# Patient Record
Sex: FEMALE | Race: WHITE | Employment: OTHER | URBAN - METROPOLITAN AREA
[De-identification: names, ages, dates, MRNs, and addresses within clinical notes are randomized per-mention and may not be internally consistent; named-entity substitution may affect disease eponyms.]

---

## 2017-01-27 ENCOUNTER — TELEPHONE (OUTPATIENT)
Dept: FAMILY MEDICINE | Facility: CLINIC | Age: 67
End: 2017-01-27

## 2017-01-27 NOTE — TELEPHONE ENCOUNTER
[facility]  Schedule at [phone] to schedule a mammogram.    Arelis Edwards

## 2017-02-02 ENCOUNTER — RADIANT APPOINTMENT (OUTPATIENT)
Dept: MAMMOGRAPHY | Facility: CLINIC | Age: 67
End: 2017-02-02
Payer: COMMERCIAL

## 2017-02-02 DIAGNOSIS — Z12.31 VISIT FOR SCREENING MAMMOGRAM: ICD-10-CM

## 2017-02-02 PROCEDURE — G0202 SCR MAMMO BI INCL CAD: HCPCS | Mod: TC

## 2017-02-06 ENCOUNTER — OFFICE VISIT (OUTPATIENT)
Dept: FAMILY MEDICINE | Facility: CLINIC | Age: 67
End: 2017-02-06
Payer: COMMERCIAL

## 2017-02-06 VITALS
HEART RATE: 66 BPM | OXYGEN SATURATION: 98 % | TEMPERATURE: 98.6 F | DIASTOLIC BLOOD PRESSURE: 79 MMHG | RESPIRATION RATE: 18 BRPM | BODY MASS INDEX: 23.55 KG/M2 | SYSTOLIC BLOOD PRESSURE: 128 MMHG | WEIGHT: 128 LBS | HEIGHT: 62 IN

## 2017-02-06 DIAGNOSIS — Z11.59 NEED FOR HEPATITIS C SCREENING TEST: ICD-10-CM

## 2017-02-06 DIAGNOSIS — R25.2 MUSCLE CRAMP: ICD-10-CM

## 2017-02-06 DIAGNOSIS — E78.5 HYPERLIPIDEMIA LDL GOAL <130: ICD-10-CM

## 2017-02-06 DIAGNOSIS — Z23 NEED FOR VACCINATION: ICD-10-CM

## 2017-02-06 DIAGNOSIS — F33.42 MAJOR DEPRESSIVE DISORDER, RECURRENT, IN FULL REMISSION (H): ICD-10-CM

## 2017-02-06 DIAGNOSIS — G89.4 CHRONIC PAIN SYNDROME: ICD-10-CM

## 2017-02-06 DIAGNOSIS — Z00.00 ENCOUNTER FOR ROUTINE ADULT HEALTH EXAMINATION WITHOUT ABNORMAL FINDINGS: Primary | ICD-10-CM

## 2017-02-06 DIAGNOSIS — F41.9 ANXIETY: ICD-10-CM

## 2017-02-06 PROCEDURE — G0009 ADMIN PNEUMOCOCCAL VACCINE: HCPCS | Performed by: NURSE PRACTITIONER

## 2017-02-06 PROCEDURE — 82306 VITAMIN D 25 HYDROXY: CPT | Performed by: NURSE PRACTITIONER

## 2017-02-06 PROCEDURE — 36415 COLL VENOUS BLD VENIPUNCTURE: CPT | Performed by: NURSE PRACTITIONER

## 2017-02-06 PROCEDURE — 80053 COMPREHEN METABOLIC PANEL: CPT | Performed by: NURSE PRACTITIONER

## 2017-02-06 PROCEDURE — 80061 LIPID PANEL: CPT | Performed by: NURSE PRACTITIONER

## 2017-02-06 PROCEDURE — 90670 PCV13 VACCINE IM: CPT | Performed by: NURSE PRACTITIONER

## 2017-02-06 PROCEDURE — 83735 ASSAY OF MAGNESIUM: CPT | Performed by: NURSE PRACTITIONER

## 2017-02-06 PROCEDURE — 99397 PER PM REEVAL EST PAT 65+ YR: CPT | Mod: 25 | Performed by: NURSE PRACTITIONER

## 2017-02-06 PROCEDURE — 99213 OFFICE O/P EST LOW 20 MIN: CPT | Mod: 25 | Performed by: NURSE PRACTITIONER

## 2017-02-06 PROCEDURE — 86803 HEPATITIS C AB TEST: CPT | Performed by: NURSE PRACTITIONER

## 2017-02-06 RX ORDER — OXYCODONE HYDROCHLORIDE 5 MG/1
TABLET ORAL
Qty: 150 TABLET | Refills: 0 | Status: SHIPPED | OUTPATIENT
Start: 2017-02-11 | End: 2017-02-06

## 2017-02-06 RX ORDER — HYDROXYZINE HYDROCHLORIDE 25 MG/1
50 TABLET, FILM COATED ORAL
Qty: 30 TABLET | Refills: 1 | Status: SHIPPED | OUTPATIENT
Start: 2017-02-06 | End: 2017-05-08

## 2017-02-06 RX ORDER — METHOCARBAMOL 750 MG/1
750 TABLET, FILM COATED ORAL EVERY 6 HOURS PRN
Qty: 180 TABLET | Status: SHIPPED | OUTPATIENT
Start: 2017-02-06 | End: 2018-01-31

## 2017-02-06 RX ORDER — OXYCODONE HYDROCHLORIDE 5 MG/1
TABLET ORAL
Qty: 150 TABLET | Refills: 0 | Status: SHIPPED | OUTPATIENT
Start: 2017-04-12 | End: 2017-05-08

## 2017-02-06 RX ORDER — OXYCODONE HYDROCHLORIDE 5 MG/1
TABLET ORAL
Qty: 150 TABLET | Refills: 0 | Status: SHIPPED | OUTPATIENT
Start: 2017-03-13 | End: 2017-02-06

## 2017-02-06 RX ORDER — SIMVASTATIN 20 MG
TABLET ORAL
Qty: 90 TABLET | Refills: 3 | Status: SHIPPED | OUTPATIENT
Start: 2017-02-06 | End: 2018-01-31

## 2017-02-06 RX ORDER — PAROXETINE 20 MG/1
10 TABLET, FILM COATED ORAL DAILY
Qty: 45 TABLET | Status: SHIPPED | OUTPATIENT
Start: 2017-02-06 | End: 2017-05-08

## 2017-02-06 NOTE — MR AVS SNAPSHOT
"              After Visit Summary   2/6/2017    Shania Holliday    MRN: [id number]           Patient Information     Date Of Birth          1950        Visit Information        Provider Department      2/6/2017 10:00 AM Lisa Merino NP [facility]        Today's Diagnoses     Encounter for routine adult health examination without abnormal findings    -  1     Chronic pain syndrome         Anxiety         Major depressive disorder, recurrent, in full remission (H)         Hyperlipidemia LDL goal <130         Muscle cramp         Need for hepatitis C screening test         Need for vaccination            Follow-ups after your visit        Who to contact     If you have questions or need follow up information about today's clinic visit or your schedule please contact [facility] directly at [phone].  Normal or non-critical lab and imaging results will be communicated to you by Highlighterhart, letter or phone within 4 business days after the clinic has received the results. If you do not hear from us within 7 days, please contact the clinic through Highlighterhart or phone. If you have a critical or abnormal lab result, we will notify you by phone as soon as possible.  Submit refill requests through Vahna or call your pharmacy and they will forward the refill request to us. Please allow 3 business days for your refill to be completed.          Additional Information About Your Visit        Highlighterhart Information     Vahna lets you send messages to your doctor, view your test results, renew your prescriptions, schedule appointments and more. To sign up, go to [email or web address] . Click on \"Log in\" on the left side of the screen, which will take you to the Welcome page. Then click on \"Sign up Now\" on the right side of the page.     You will be asked to enter the access code listed below, as well as some personal information. Please follow the directions to create your username " "and password.     Your access code is: [email or web address]  Expires: 2017 10:32 AM     Your access code will  in 90 days. If you need help or a new code, please call your [place] clinic or [phone].        Care EveryWhere ID     This is your Care EveryWhere ID. This could be used by other organizations to access your [place] medical records  [id number]        Your Vitals Were     Pulse Temperature Respirations Height BMI (Body Mass Index) Pulse Oximetry    66 98.6  F (37  C) (Oral) 18 5' 1.5\" (1.562 m) 23.80 kg/m2 98%       Blood Pressure from Last 3 Encounters:   17 128/79   16 129/79   10/29/16 115/75    Weight from Last 3 Encounters:   17 128 lb (58.06 kg)   16 129 lb (58.514 kg)   10/29/16 128 lb (58.06 kg)              We Performed the Following     Comprehensive metabolic panel     DEPRESSION ACTION PLAN (DAP)     Hepatitis C Screen Reflex to HCV RNA Quant and Genotype     Lipid Profile with reflex to direct LDL     Magnesium     PNEUMOCOCCAL CONJ VACCINE 13 VALENT IM (PREVNAR 13)     Vitamin D Deficiency          Today's Medication Changes          These changes are accurate as of: 17 10:32 AM.  If you have any questions, ask your nurse or doctor.               Start taking these medicines.        Dose/Directions    hydrOXYzine 25 MG tablet   Commonly known as:  ATARAX   Used for:  Anxiety        Dose:  50 mg   Take 2 tablets (50 mg) by mouth nightly as needed for anxiety   Quantity:  30 tablet   Refills:  1       oxyCODONE 5 MG IR tablet   Commonly known as:  ROXICODONE   Used for:  Chronic pain syndrome        Start taking on:  2017   Take 1-2 tablets by mouth. Every 4-6 hours as needed for pain  *Must last 30 days   Quantity:  150 tablet   Refills:  0            Where to get your medicines      These medications were sent to [id number] - [place], [place] - [address]  [address], [place] [address]    Hours:  test fax " successfully sent 10/22/03  Phone:  [phone]    - hydrOXYzine 25 MG tablet  - methocarbamol 750 MG tablet  - PARoxetine 20 MG tablet  - simvastatin 20 MG tablet      Some of these will need a paper prescription and others can be bought over the counter.  Ask your nurse if you have questions.     Bring a paper prescription for each of these medications    - oxyCODONE 5 MG IR tablet             Primary Care Provider Office Phone # Fax #    Lisa Merino [phone]       [facility] [address] [place]  [facility] [address]        Thank you!     Thank you for choosing [facility]  for your care. Our goal is always to provide you with excellent care. Hearing back from our patients is one way we can continue to improve our services. Please take a few minutes to complete the written survey that you may receive in the mail after your visit with us. Thank you!             Your Updated Medication List - Protect others around you: Learn how to safely use, store and throw away your medicines at www.disposemymeds.org.          This list is accurate as of: 2/6/17 10:32 AM.  Always use your most recent med list.                   Brand Name Dispense Instructions for use    albuterol 108 (90 BASE) MCG/ACT Inhaler    PROAIR HFA/PROVENTIL HFA/VENTOLIN HFA    1 Inhaler    Inhale 2 puffs into the lungs every 6 hours as needed for shortness of breath / dyspnea or wheezing       Aspercreme 10 % cream   Generic drug:  trolamine salicylate      daily use x4 times       aspirin 325 MG EC tablet      1 TABLET daily       CALCIUM + D PO      BID       cetirizine HCl 10 MG Caps    ZYRTEC ALLERGY    90 capsule    Take 10 mg by mouth daily Take  by mouth.       fluticasone 50 MCG/ACT spray    FLONASE     Spray 2 sprays into both nostrils as needed       hydrOXYzine 25 MG tablet    ATARAX    30 tablet    Take 2 tablets (50 mg) by mouth nightly as needed for anxiety       methocarbamol  750 MG tablet    ROBAXIN    180 tablet    Take 1 tablet (750 mg) by mouth every 6 hours as needed for muscle spasms       MULTIVITAMIN TABS   OR      Take one daily       nicotine polacrilex 2 MG gum    NICORETTE STARTER KIT    30 tablet    Place 1 each (2 mg) inside cheek as needed for smoking cessation       oxyCODONE 5 MG IR tablet   Start taking on:  4/12/2017    ROXICODONE    150 tablet    Take 1-2 tablets by mouth. Every 4-6 hours as needed for pain  *Must last 30 days       pantoprazole 40 MG EC tablet    PROTONIX    90 tablet    Take 1 tablet (40 mg) by mouth daily       PARoxetine 20 MG tablet    PAXIL    45 tablet    Take 0.5 tablets (10 mg) by mouth daily       PROBIOTIC DAILY PO          simvastatin 20 MG tablet    ZOCOR    90 tablet    Take  by mouth. 1 TABLET AT BEDTIME       valACYclovir 500 MG tablet    VALTREX    30 tablet    Take  by mouth. one tablet twice daily X 3 days PRN

## 2017-02-06 NOTE — Clinical Note
My Depression Action Plan  Name: Shania Holliday   Date of Birth 1950  Date: 2/6/2017    My doctor: Lisa Merino   My clinic: [address] [id number] [phone]          GREEN    ZONE   Good Control    What it looks like:     Things are going generally well. You have normal up s and down s. You may even feel depressed from time to time, but bad moods usually last less than a day.   What you need to do:  1. Continue to care for yourself (see self care plan)  2. Check your depression survival kit and update it as needed  3. Follow your physician s recommendations including any medication.  4. Do not stop taking medication unless you consult with your physician first.           YELLOW         ZONE Getting Worse    What it looks like:     Depression is starting to interfere with your life.     It may be hard to get out of bed; you may be starting to isolate yourself from others.    Symptoms of depression are starting to last most all day and this has happened for several days.     You may have suicidal thoughts but they are not constant.   What you need to do:     1. Call your care team, your response to treatment will improve if you keep your care team informed of your progress. Yellow periods are signs an adjustment may need to be made.     2. Continue your self-care, even if you have to fake it!    3. Talk to someone in your support network    4. Open up your depression survival kit           RED    ZONE Medical Alert - Get Help    What it looks like:     Depression is seriously interfering with your life.     You may experience these or other symptoms: You can t get out of bed most days, can t work or engage in other necessary activities, you have trouble taking care of basic hygiene, or basic responsibilities, thoughts of suicide or death that will not go away, self-injurious behavior.     What you need to do:  1. Call your care team and  request a same-day appointment. If they are not available (weekends or after hours) call your local crisis line, emergency room or 911.      Electronically signed by: Emily Montes, February 6, 2017    Depression Self Care Plan / Survival Kit    Self-Care for Depression  Here s the deal. Your body and mind are really not as separate as most people think.  What you do and think affects how you feel and how you feel influences what you do and think. This means if you do things that people who feel good do, it will help you feel better.  Sometimes this is all it takes.  There is also a place for medication and therapy depending on how severe your depression is, so be sure to consult with your medical provider and/ or Behavioral Health Consultant if your symptoms are worsening or not improving.     In order to better manage my stress, I will:    Exercise  Get some form of exercise, every day. This will help reduce pain and release endorphins, the  feel good  chemicals in your brain. This is almost as good as taking antidepressants!  This is not the same as joining a gym and then never going! (they count on that by the way ) It can be as simple as just going for a walk or doing some gardening, anything that will get you moving.      Hygiene   Maintain good hygiene (Get out of bed in the morning, Make your bed, Brush your teeth, Take a shower, and Get dressed like you were going to work, even if you are unemployed).  If your clothes don't fit try to get ones that do.    Diet  I will strive to eat foods that are good for me, drink plenty of water, and avoid excessive sugar, caffeine, alcohol, and other mood-altering substances.  Some foods that are helpful in depression are: complex carbohydrates, B vitamins, flaxseed, fish or fish oil, fresh fruits and vegetables.    Psychotherapy  I agree to participate in Individual Therapy (if recommended).    Medication  If prescribed medications, I agree to take them.  Missing doses  can result in serious side effects.  I understand that drinking alcohol, or other illicit drug use, may cause potential side effects.  I will not stop my medication abruptly without first discussing it with my provider.    Staying Connected With Others  I will stay in touch with my friends, family members, and my primary care provider/team.    Use your imagination  Be creative.  We all have a creative side; it doesn t matter if it s oil painting, sand castles, or mud pies! This will also kick up the endorphins.    Witness Beauty  (AKA stop and smell the roses) Take a look outside, even in mid-winter. Notice colors, textures. Watch the squirrels and birds.     Service to others  Be of service to others.  There is always someone else in need.  By helping others we can  get out of ourselves  and remember the really important things.  This also provides opportunities for practicing all the other parts of the program.    Humor  Laugh and be silly!  Adjust your TV habits for less news and crime-drama and more comedy.    Control your stress  Try breathing deep, massage therapy, biofeedback, and meditation. Find time to relax each day.     My support system    Clinic Contact:  Phone number:    Contact 1:  Phone number:    Contact 2:  Phone number:    Yazidism/:  Phone number:    Therapist:  Phone number:    Local crisis center:    Phone number:    Other community support:  Phone number:

## 2017-02-06 NOTE — PROGRESS NOTES
SUBJECTIVE:                                                            Shania Holliday is a 66 year old female who presents for Preventive Visit.    Are you in the first 12 months of your Medicare coverage?  No    Physical  Annual:     Getting at least 3 servings of Calcium per day::  Yes    Bi-annual eye exam::  NO    Dental care twice a year::  Yes    Sleep apnea or symptoms of sleep apnea::  Excessive snoring    Diet::  Regular (no restrictions)    Frequency of exercise::  2-3 days/week    Duration of exercise::  15-30 minutes    Taking medications regularly::  Yes    Barriers to taking medications::  None    Medication side effects::  None    Additional concerns today::  No  Two family members are having surgery and she has been under more stress due to this.  She has had some difficulty trying to relax and sleep lately.  She found old Klonopin from 2007 and took one which helped and is wondering if she can get more.      She also needs refills of her Oxycodone which she takes for chronic neck and back pain due degenerative disc disease. She takes #150 a month and this helps keep her pain level stable.     COGNITIVE SCREEN  1) Repeat 3 items (Banana, Sunrise, Chair)    2) Clock draw: NORMAL  3) 3 item recall:   Results:     Mini-CogTM Copyright S Cha. Licensed by the author for use in [facility]; reprinted with permission (maxine@[facility]). All rights reserved.        All Histories reviewed and updated in EPIC as appropriate.  Social History   Substance Use Topics     Smoking status: Current Every Day Smoker -- 0.50 packs/day for 38 years     Types: Cigarettes     Smokeless tobacco: Never Used      Comment: 5 per day.      Alcohol Use: No       The patient does not drink >3 drinks per day nor >7 drinks per week.            Today's PHQ-2 Score:   PHQ-2 ( 1999 Pfizer) 2/6/2017   Q1: Little interest or pleasure in doing things -   Q2: Feeling down, depressed or hopeless -   PHQ-2 Score -   Little interest  or pleasure in doing things Not at all   Feeling down, depressed or hopeless Not at all   PHQ-2 Score 0       Do you feel safe in your environment - Yes    Do you have a Health Care Directive?: No: Advance care planning reviewed with patient; information given to patient to review.    Current providers sharing in care for this patient include:   Patient Care Team:  Lisa Merino, NP as PCP - General      Hearing impairment: No, wax ine ears     Ability to successfully perform activities of daily living: Yes, no assistance needed     Fall risk:  Fallen 2 or more times in the past year?: No  Any fall with injury in the past year?: No    Home safety:  none identified      The following health maintenance items are reviewed in Epic and correct as of today:  Health Maintenance   Topic Date Due     URINE DRUG SCREEN Q1 YR  07/23/1966     HEPATITIS C SCREENING  07/23/1969     PNEUMOCOCCAL (1 of 2 - PCV13) 07/23/2016     LUNG CANCER SCREENING ANNUAL  04/23/2017     TOBACCO CESSATION COUNSELING Q1 YR  04/25/2017     PHQ-9 Q6 MONTHS (NO INBASKET)  05/14/2017     FALL RISK ASSESSMENT  08/03/2017     INFLUENZA VACCINE (SYSTEM ASSIGNED)  09/01/2017     NASRA QUESTIONNAIRE 1 YEAR  11/14/2017     DEPRESSION ACTION PLAN Q1 YR (NO INBASKET)  02/06/2018     MAMMO SCREEN Q2 YR (SYSTEM ASSIGNED)  02/02/2019     ADVANCE DIRECTIVE PLANNING Q5 YRS (NO INBASKET)  04/17/2019     LIPID SCREEN Q5 YR FEMALE (SYSTEM ASSIGNED)  01/26/2021     TETANUS IMMUNIZATION (SYSTEM ASSIGNED)  07/30/2023     COLON CANCER SCREEN (SYSTEM ASSIGNED)  12/15/2024     DEXA SCAN SCREENING (SYSTEM ASSIGNED)  Completed              ROS:  C: NEGATIVE for fever, chills, change in weight  EYES: NEGATIVE for vision changes or irritation  E/M: NEGATIVE for ear, mouth and throat problems  R: NEGATIVE for significant cough or SOB  BREAST: NEGATIVE for masses, tenderness or discharge  CV: NEGATIVE for chest pain, palpitations or peripheral edema  GI: NEGATIVE for nausea,  "abdominal pain, heartburn, or change in bowel habits  MUSCULOSKELETAL: see HPI  NEURO: NEGATIVE for weakness, dizziness or paresthesias  ENDOCRINE: NEGATIVE for temperature intolerance, skin/hair changes  PSYCHIATRIC: see HPI    Problem list, Medication list, Allergies, and Medical/Social/Surgical histories reviewed in [facility] and updated as appropriate.  OBJECTIVE:                                                            /79 mmHg  Pulse 66  Temp(Src) 98.6  F (37  C) (Oral)  Resp 18  Ht 5' 1.5\" (1.562 m)  Wt 128 lb (58.06 kg)  BMI 23.80 kg/m2  SpO2 98% Estimated body mass index is 23.8 kg/(m^2) as calculated from the following:    Height as of this encounter: 5' 1.5\" (1.562 m).    Weight as of this encounter: 128 lb (58.06 kg).  EXAM:   GENERAL: healthy, alert and no distress  EYES: Eyes grossly normal to inspection, PERRL and conjunctivae and sclerae normal  HENT: ear canals and TM's normal, nose and mouth without ulcers or lesions  NECK: no adenopathy, no asymmetry, masses, or scars and thyroid normal to palpation  RESP: lungs clear to auscultation - no rales, rhonchi or wheezes  BREAST: normal without masses, tenderness or nipple discharge and no palpable axillary masses or adenopathy  CV: regular rate and rhythm, normal S1 S2, no S3 or S4, no murmur, click or rub, no peripheral edema and peripheral pulses strong  ABDOMEN: soft, nontender, no hepatosplenomegaly, no masses and bowel sounds normal  MS: decreased ROM of the cervical and lumbar spine  SKIN: no suspicious lesions or rashes  NEURO: Normal strength and tone, mentation intact and speech normal  PSYCH: mentation appears normal, affect normal/bright    ASSESSMENT / PLAN:                                                            1. Encounter for routine adult health examination without abnormal findings      2. Chronic pain syndrome  Refills for 3 months given.  Follow up in 3 months.   - methocarbamol (ROBAXIN) 750 MG tablet; Take 1 tablet " "(750 mg) by mouth every 6 hours as needed for muscle spasms  Dispense: 180 tablet; Refill: PRN  - oxyCODONE (ROXICODONE) 5 MG IR tablet; Take 1-2 tablets by mouth. Every 4-6 hours as needed for pain   *Must last 30 days  Dispense: 150 tablet; Refill: 0    3. Anxiety  Discussed the use and indication of this medication as well as potential side effects.  Will take at HS PRN.    - hydrOXYzine (ATARAX) 25 MG tablet; Take 2 tablets (50 mg) by mouth nightly as needed for anxiety  Dispense: 30 tablet; Refill: 1    4. Major depressive disorder, recurrent, in full remission (H)  The current medical regimen is effective;  continue present plan and medications.   - PARoxetine (PAXIL) 20 MG tablet; Take 0.5 tablets (10 mg) by mouth daily  Dispense: 45 tablet; Refill: PRN    5. Hyperlipidemia LDL goal <130    - Lipid Profile with reflex to direct LDL  - simvastatin (ZOCOR) 20 MG tablet; Take  by mouth. 1 TABLET AT BEDTIME  Dispense: 90 tablet; Refill: 3  - Comprehensive metabolic panel    6. Muscle cramp    - Magnesium  - Vitamin D Deficiency    7. Need for hepatitis C screening test    - Hepatitis C Screen Reflex to HCV RNA Quant and Genotype    8. Need for vaccination    - PNEUMOCOCCAL CONJ VACCINE 13 VALENT IM (PREVNAR 13)    End of Life Planning:  Patient currently has an advanced directive: Yes.  Practitioner is supportive of decision.    COUNSELING:  Reviewed preventive health counseling, as reflected in patient instructions        Estimated body mass index is 23.8 kg/(m^2) as calculated from the following:    Height as of this encounter: 5' 1.5\" (1.562 m).    Weight as of this encounter: 128 lb (58.06 kg).     reports that she has been smoking Cigarettes.  She has a 19 pack-year smoking history. She has never used smokeless tobacco.  Tobacco Cessation Action Plan: Information offered: Patient not interested at this time    Appropriate preventive services were discussed with this patient, including applicable screening as " appropriate for cardiovascular disease, diabetes, osteopenia/osteoporosis, and glaucoma.  As appropriate for age/gender, discussed screening for colorectal cancer, prostate cancer, breast cancer, and cervical cancer. Checklist reviewing preventive services available has been given to the patient.    Reviewed patients plan of care and provided an AVS. The Basic Care Plan (routine screening as documented in Health Maintenance) for Shania meets the Care Plan requirement. This Care Plan has been established and reviewed with the Patient.    Counseling Resources:  ATP IV Guidelines  Pooled Cohorts Equation Calculator  Breast Cancer Risk Calculator  FRAX Risk Assessment  ICSI Preventive Guidelines  Dietary Guidelines for Americans, 2010  USDA's MyPlate  ASA Prophylaxis  Lung CA Screening    Lisa Merino NP  [facility] hospital  Answers for HPI/ROS submitted by the patient on 2/6/2017   PHQ-2 Depressed: Not at all, Not at all  PHQ-2 Score: 0

## 2017-02-06 NOTE — NURSING NOTE
"Chief Complaint   Patient presents with     Medicare Visit     Refill Request     Discuss Clonazepam 0.5 MG tablet        Initial /79 mmHg  Pulse 66  Temp(Src) 98.6  F (37  C) (Oral)  Resp 18  Ht 5' 1.5\" (1.562 m)  Wt 128 lb (58.06 kg)  BMI 23.80 kg/m2  SpO2 98% Estimated body mass index is 23.8 kg/(m^2) as calculated from the following:    Height as of this encounter: 5' 1.5\" (1.562 m).    Weight as of this encounter: 128 lb (58.06 kg).  Medication Reconciliation: complete     Emily Montes MA      "

## 2017-02-06 NOTE — NURSING NOTE
Screening Questionnaire for Adult Immunization    Are you sick today?   No   Do you have allergies to medications, food, a vaccine component or latex?   Yes   Have you ever had a serious reaction after receiving a vaccination?   No   Do you have a long-term health problem with heart disease, lung disease, asthma, kidney disease, metabolic disease (e.g. diabetes), anemia, or other blood disorder?   No   Do you have cancer, leukemia, HIV/AIDS, or any other immune system problem?   No   In the past 3 months, have you taken medications that affect  your immune system, such as prednisone, other steroids, or anticancer drugs; drugs for the treatment of rheumatoid arthritis, Crohn s disease, or psoriasis; or have you had radiation treatments?   No   Have you had a seizure, or a brain or other nervous system problem?   No   During the past year, have you received a transfusion of blood or blood     products, or been given immune (gamma) globulin or antiviral drug?   No   For women: Are you pregnant or is there a chance you could become        pregnant during the next month?   No   Have you received any vaccinations in the past 4 weeks?   No     Immunization questionnaire answers were all negative.      MNVFC doesn't apply on this patient    Per orders of Dr. Merino, injection of Prevnar 13 given by Emily Montes. Patient instructed to remain in clinic for 20 minutes afterwards, and to report any adverse reaction to me immediately.       Screening performed by Emily Montes on 2/6/2017 at 10:39 AM.

## 2017-02-06 NOTE — Clinical Note
[address]  [address]  [phone]        February 8, 2017      Shania Holliday  [address] [place] [id number]          Dear Ms. Holliday,    The results of your recent lab tests were within normal limits. Enclosed is a copy of these results.  Cholesterol level is acceptable.  Your metabolic panel (electrolytes, blood sugar, kidney and liver function) is normal.    Magnesium level is normal.  Hepatitis C test is negative.    Vitamin D level is normal.    If you have any further questions or problems, please contact our office.    Sincerely,      Lisa Merino CNP    Resulted Orders   Lipid Profile with reflex to direct LDL   Result Value Ref Range    Cholesterol 199 <200 mg/dL    Triglycerides 82 <150 mg/dL    HDL Cholesterol 67 >49 mg/dL    LDL Cholesterol Calculated 116 (H) <100 mg/dL      Comment:      Above desirable:  100-129 mg/dl   Borderline High:  130-159 mg/dL   High:             160-189 mg/dL   Very high:       >189 mg/dl      Non HDL Cholesterol 132 (H) <130 mg/dL      Comment:      Above Desirable:  130-159 mg/dl   Borderline high:  160-189 mg/dl   High:             190-219 mg/dl   Very high:       >219 mg/dl     Comprehensive metabolic panel   Result Value Ref Range    Sodium 138 133 - 144 mmol/L    Potassium 3.8 3.4 - 5.3 mmol/L    Chloride 105 94 - 109 mmol/L    Carbon Dioxide 26 20 - 32 mmol/L    Anion Gap 7 3 - 14 mmol/L    Glucose 69 (L) 70 - 99 mg/dL    Urea Nitrogen 8 7 - 30 mg/dL    Creatinine 0.72 0.52 - 1.04 mg/dL    GFR Estimate 81 >60 mL/min/1.7m2      Comment:      Non  GFR Calc    GFR Estimate If Black >90   GFR Calc   >60 mL/min/1.7m2    Calcium 8.8 8.5 - 10.1 mg/dL    Bilirubin Total 0.5 0.2 - 1.3 mg/dL    Albumin 4.1 3.4 - 5.0 g/dL    Protein Total 7.2 6.8 - 8.8 g/dL    Alkaline Phosphatase 70 40 - 150 U/L    ALT 27 0 - 50 U/L    AST 19 0 - 45 U/L   Magnesium   Result Value Ref Range     Magnesium 2.1 1.6 - 2.3 mg/dL   Vitamin D Deficiency   Result Value Ref Range    Vitamin D Deficiency screening 49 20 - 75 ug/L      Comment:      Season, race, dietary intake, and treatment affect the concentration of   25-hydroxy-Vitamin D. Values may decrease during winter months and increase   during summer months. Values 20-29 ug/L may indicate Vitamin D insufficiency   and values <20 ug/L may indicate Vitamin D deficiency.   Vitamin D determination is routinely performed by an immunoassay specific for   25 hydroxyvitamin D3.  If an individual is on vitamin D2 (ergocalciferol)   supplementation, please specify 25 OH vitamin D2 and D3 level determination   by   LCMSMS test VITD23.     Hepatitis C Screen Reflex to HCV RNA Quant and Genotype   Result Value Ref Range    Hepatitis C Antibody  NR     Nonreactive   Assay performance characteristics have not been established for newborns,   infants, and children

## 2017-02-07 LAB
ALBUMIN SERPL-MCNC: 4.1 G/DL (ref 3.4–5)
ALP SERPL-CCNC: 70 U/L (ref 40–150)
ALT SERPL W P-5'-P-CCNC: 27 U/L (ref 0–50)
ANION GAP SERPL CALCULATED.3IONS-SCNC: 7 MMOL/L (ref 3–14)
AST SERPL W P-5'-P-CCNC: 19 U/L (ref 0–45)
BILIRUB SERPL-MCNC: 0.5 MG/DL (ref 0.2–1.3)
BUN SERPL-MCNC: 8 MG/DL (ref 7–30)
CALCIUM SERPL-MCNC: 8.8 MG/DL (ref 8.5–10.1)
CHLORIDE SERPL-SCNC: 105 MMOL/L (ref 94–109)
CHOLEST SERPL-MCNC: 199 MG/DL
CO2 SERPL-SCNC: 26 MMOL/L (ref 20–32)
CREAT SERPL-MCNC: 0.72 MG/DL (ref 0.52–1.04)
DEPRECATED CALCIDIOL+CALCIFEROL SERPL-MC: 49 UG/L (ref 20–75)
GFR SERPL CREATININE-BSD FRML MDRD: 81 ML/MIN/1.7M2
GLUCOSE SERPL-MCNC: 69 MG/DL (ref 70–99)
HCV AB SERPL QL IA: NORMAL
HDLC SERPL-MCNC: 67 MG/DL
LDLC SERPL CALC-MCNC: 116 MG/DL
MAGNESIUM SERPL-MCNC: 2.1 MG/DL (ref 1.6–2.3)
NONHDLC SERPL-MCNC: 132 MG/DL
POTASSIUM SERPL-SCNC: 3.8 MMOL/L (ref 3.4–5.3)
PROT SERPL-MCNC: 7.2 G/DL (ref 6.8–8.8)
SODIUM SERPL-SCNC: 138 MMOL/L (ref 133–144)
TRIGL SERPL-MCNC: 82 MG/DL

## 2017-04-06 DIAGNOSIS — Z72.0 TOBACCO ABUSE: ICD-10-CM

## 2017-04-07 RX ORDER — ALBUTEROL SULFATE 90 UG/1
AEROSOL, METERED RESPIRATORY (INHALATION)
Qty: 8.5 G | Status: SHIPPED | OUTPATIENT
Start: 2017-04-07 | End: 2019-10-23

## 2017-04-07 NOTE — TELEPHONE ENCOUNTER
Pt needs her inhaler ASAP. She needs it this weekend. Can another provider sign off on this? Please contact pt at #[phone]. Thanks!

## 2017-04-07 NOTE — TELEPHONE ENCOUNTER
Routing refill request to provider for review/approval because:  -associated diagnosis of tobacco abuse not on [facility] refill protocol for this medication    Routing to PCP.    Marisa-Please sign if agree.    Thank you!  VIANEY Blnaco, BONITAN, RN

## 2017-04-07 NOTE — TELEPHONE ENCOUNTER
Medication Detail      Disp Refills Start End TAMRA   albuterol (PROAIR HFA, PROVENTIL HFA, VENTOLIN HFA) 108 (90 BASE) MCG/ACT inhaler 1 Inhaler PRN 1/26/2016  No   Sig: Inhale 2 puffs into the lungs every 6 hours as needed for shortness of breath / dyspnea or wheezing        Last Office Visit with [facility], [email or web address] or [facility] prescribing provider:  2-6-17   Future Office Visit:       Date of Last Asthma Action Plan Letter:   There are no preventive care reminders to display for this patient.   Asthma Control Test: No flowsheet data found.    Date of Last Spirometry Test:   No results found for this or any previous visit.

## 2017-05-08 ENCOUNTER — OFFICE VISIT (OUTPATIENT)
Dept: FAMILY MEDICINE | Facility: CLINIC | Age: 67
End: 2017-05-08
Payer: COMMERCIAL

## 2017-05-08 VITALS
WEIGHT: 133.5 LBS | SYSTOLIC BLOOD PRESSURE: 128 MMHG | TEMPERATURE: 97.5 F | BODY MASS INDEX: 24.82 KG/M2 | RESPIRATION RATE: 18 BRPM | DIASTOLIC BLOOD PRESSURE: 79 MMHG | HEART RATE: 75 BPM | OXYGEN SATURATION: 98 %

## 2017-05-08 DIAGNOSIS — F41.9 ANXIETY: ICD-10-CM

## 2017-05-08 DIAGNOSIS — F33.42 MAJOR DEPRESSIVE DISORDER, RECURRENT, IN FULL REMISSION (H): ICD-10-CM

## 2017-05-08 DIAGNOSIS — K21.9 GASTROESOPHAGEAL REFLUX DISEASE WITHOUT ESOPHAGITIS: ICD-10-CM

## 2017-05-08 DIAGNOSIS — G89.4 CHRONIC PAIN SYNDROME: Primary | ICD-10-CM

## 2017-05-08 DIAGNOSIS — F17.200 TOBACCO USE DISORDER: ICD-10-CM

## 2017-05-08 DIAGNOSIS — Z87.891 HISTORY OF TOBACCO USE: ICD-10-CM

## 2017-05-08 PROCEDURE — G0296 VISIT TO DETERM LDCT ELIG: HCPCS | Performed by: NURSE PRACTITIONER

## 2017-05-08 PROCEDURE — 99214 OFFICE O/P EST MOD 30 MIN: CPT | Performed by: NURSE PRACTITIONER

## 2017-05-08 RX ORDER — PANTOPRAZOLE SODIUM 40 MG/1
40 TABLET, DELAYED RELEASE ORAL DAILY
Qty: 90 TABLET | Status: SHIPPED | OUTPATIENT
Start: 2017-05-08 | End: 2018-04-25

## 2017-05-08 RX ORDER — OXYCODONE HYDROCHLORIDE 5 MG/1
TABLET ORAL
Qty: 150 TABLET | Refills: 0 | Status: SHIPPED | OUTPATIENT
Start: 2017-06-07 | End: 2017-05-08

## 2017-05-08 RX ORDER — HYDROXYZINE HYDROCHLORIDE 25 MG/1
50 TABLET, FILM COATED ORAL
Qty: 30 TABLET | Status: SHIPPED | OUTPATIENT
Start: 2017-05-08 | End: 2018-05-30

## 2017-05-08 RX ORDER — PAROXETINE 20 MG/1
10 TABLET, FILM COATED ORAL DAILY
Qty: 45 TABLET | Status: SHIPPED | OUTPATIENT
Start: 2017-05-08 | End: 2018-01-31

## 2017-05-08 RX ORDER — OXYCODONE HYDROCHLORIDE 5 MG/1
TABLET ORAL
Qty: 150 TABLET | Refills: 0 | Status: SHIPPED | OUTPATIENT
Start: 2017-07-07 | End: 2017-08-07

## 2017-05-08 RX ORDER — OXYCODONE HYDROCHLORIDE 5 MG/1
TABLET ORAL
Qty: 150 TABLET | Refills: 0 | Status: SHIPPED | OUTPATIENT
Start: 2017-05-08 | End: 2017-05-08

## 2017-05-08 NOTE — MR AVS SNAPSHOT
After Visit Summary   5/8/2017    Shania Holliday    MRN: [id number]           Patient Information     Date Of Birth          1950        Visit Information        Provider Department      5/8/2017 10:00 AM Lisa Merino NP [facility]        Today's Diagnoses     Chronic pain syndrome    -  1    Major depressive disorder, recurrent, in full remission (H)        Anxiety        Gastroesophageal reflux disease without esophagitis        Tobacco use disorder        History of tobacco use          Care Instructions      Lung Cancer Screening   Frequently Asked Questions  If you are at high-risk for lung cancer, getting screened with low-dose computed tomography (LDCT) every year can help save your life. This handout offers answers to some of the most common questions about lung cancer screening. If you have other questions, please call [phone] ([phone]).     What is it?  Lung cancer screening uses special X-ray technology to create an image of your lung tissue. The exam is quick and easy and takes less than 10 seconds. We don t give you any medicine or use any needles. You can eat before and after the exam. You don t need to change your clothes as long as the clothing on your chest doesn t contain metal. But, you do need to be able to hold your breath for at least 6 seconds during the exam.    What is the goal of lung cancer screening?  The goal of lung cancer screening is to save lives. Many times, lung cancer is not found until a person starts having physical symptoms. Lung cancer screening can help detect lung cancer in the earliest stages when it may be easier to treat.    Who should be screened for lung cancer?  We suggest lung cancer screening for anyone who is at high-risk for lung cancer. You are in the high-risk group if you:      are between the ages of 55 and 79, and    have smoked at least 1 pack of cigarettes a day for 30 or more years, and    still  smoke or have quit within the past 15 years.    However, if you have a new cough or shortness of breath, you should talk to your doctor before being screened.    Some national lung health advocacy groups also recommend screening for people ages 50 to 79 who have smoked an average of 1 pack of cigarettes a day for 20 years. They must also have at least 1 other risk factor for lung cancer, not including exposure to secondhand smoke. Other risk factors are having had cancer in the past, emphysema, pulmonary fibrosis, COPD, a family history of lung cancer, or exposure to certain materials such as arsenic, asbestos, beryllium, cadmium, chromium, diesel fumes, nickel, radon or silica. Your care team can help you know if you have one of these risk factors.     Why does it matter if I have symptoms?  Certain symptoms can be a sign that you have a condition in your lungs that should be checked and treated by your doctor. These symptoms include fever, chest pain, a new or changing cough, shortness of breath that you have never felt before, coughing up blood or unexplained weight loss. Having any of these symptoms can greatly affect the results of lung cancer screening.       Should all smokers get an LDCT lung cancer screening exam?  It depends. Lung cancer screening is for a very specific group of men and women who have a history of heavy smoking over a long period of time (see  Who should be screened for lung cancer  above).  I am in the high-risk group, but have been diagnosed with cancer in the past. Is LDCT lung cancer screening right for me?  In some cases, you should not have LDCT lung screening, such as when your doctor is already following your cancer with CT scan studies. Your doctor will help you decide if LDCT lung screening is right for you.  Do I need to have a screening exam every year?  Yes. If you are in the high-risk group described earlier, you should get an LDCT lung cancer screening exam every year until  you are 79, or are no longer willing or able to undergo screening and possible procedures to diagnose and treat lung cancer.  How effective is LDCT at preventing death from lung cancer?  Studies have shown that LDCT lung cancer screening can lower the risk of death from lung cancer by 20 percent in people who are at high-risk.  What are the risks?  There are some risks and limitations of LDCT lung cancer screening. We want to make sure you understand the risks and benefits, so please let us know if you have any questions. Your doctor may want to talk with you more about these risks.    Radiation exposure: As with any exam that uses radiation, there is a very small increased risk of cancer. The amount of radiation in LDCT is small--about the same amount a person would get from a mammogram. Your doctor orders the exam when he or she feels the potential benefits outweigh the risks.    False negatives: No test is perfect, including LDCT. It is possible that you may have a medical condition, including lung cancer, that is not found during your exam. This is called a false negative result.    False positives and more testing: LDCT very often finds something in the lung that could be cancer, but in fact is not. This is called a false positive result. False positive tests often cause anxiety. To make sure these findings are not cancer, you may need to have more tests. These tests will be done only if you give us permission. Sometimes patients need a treatment that can have side effects, such as a biopsy. For more information on false positives, see  What can I expect from the results?     Findings not related to lung cancer: Your LDCT exam also takes pictures of areas of your body next to your lungs. In a very small number of cases, the CT scan will show an abnormal finding in one of these areas, such as your kidneys, adrenal glands, liver or thyroid. This finding may not be serious, but you may need more tests. Your doctor  can help you decide what other tests you may need, if any.  What can I expect from the results?  About 1 out of 4 LDCT exams will find something that may need more tests. Most of the time, these findings are lung nodules. Lung nodules are very small collections of tissue in the lung. These nodules are very common, and the vast majority--more than 97 percent--are not cancer (benign). Most are normal lymph nodes or small areas of scarring from past infections.  But, if a small lung nodule is found to be cancer, the cancer can be cured more than 90 percent of the time. To know if the nodule is cancer, we may need to get more images before your next yearly screening exam. If the nodule has suspicious features (for example, it is large, has an odd shape or grows over time), we will refer you to a specialist for further testing.  Will my doctor also get the results?  Yes. Your doctor will get a copy of your results.  Is it okay to keep smoking now that there s a cancer screening exam?  No. Tobacco is one of the strongest cancer-causing agents. It causes not only lung cancer, but other cancers and cardiovascular (heart) diseases as well. The damage caused by smoking builds over time. This means that the longer you smoke, the higher your risk of disease. While it is never too late to quit, the sooner you quit, the better.  Where can I find help to quit smoking?  The best way to prevent lung cancer is to stop smoking. If you have already quit smoking, congratulations and keep it up! For help on quitting smoking, please call [employer] at 1-567-289-ELAJ ([phone]) or the American Cancer Society at 1-109.827.2005 to find local resources near you.  One-on-one health coaching:  If you d prefer to work individually with a health care provider on tobacco cessation, we offer:      Medication Therapy Management:  Our specially trained pharmacists work closely with you and your doctor to help you quit smoking.  Call [phone] or  "[phone] (toll free).     Can Do: Health coaching offered by [facility].  [email or web address]          Follow-ups after your visit        Who to contact     If you have questions or need follow up information about today's clinic visit or your schedule please contact [facility] directly at [phone].  Normal or non-critical lab and imaging results will be communicated to you by MyChart, letter or phone within 4 business days after the clinic has received the results. If you do not hear from us within 7 days, please contact the clinic through [email or web address] or phone. If you have a critical or abnormal lab result, we will notify you by phone as soon as possible.  Submit refill requests through [employer] or call your pharmacy and they will forward the refill request to us. Please allow 3 business days for your refill to be completed.          Additional Information About Your Visit        [email or web address] Information     [employer] lets you send messages to your doctor, view your test results, renew your prescriptions, schedule appointments and more. To sign up, go to [email or web address] . Click on \"Log in\" on the left side of the screen, which will take you to the Welcome page. Then click on \"Sign up Now\" on the right side of the page.     You will be asked to enter the access code listed below, as well as some personal information. Please follow the directions to create your username and password.     Your access code is: [email or web address]  Expires: 2017  7:45 AM     Your access code will  in 90 days. If you need help or a new code, please call your [facility] clinic or [phone].        Care EveryWhere ID     This is your Care EveryWhere ID. This could be used by other organizations to access your [facility] medical records  [id number]        Your Vitals Were     Pulse Temperature Respirations Pulse Oximetry BMI (Body Mass Index)       75 97.5  F (36.4  C) (Oral) 18 98% 24.82 " kg/m2        Blood Pressure from Last 3 Encounters:   05/08/17 128/79   02/06/17 128/79   11/14/16 129/79    Weight from Last 3 Encounters:   05/08/17 133 lb 8 oz (60.6 kg)   02/06/17 128 lb (58.1 kg)   11/14/16 129 lb (58.5 kg)              We Performed the Following     Prof fee: Shared Decisionmaking for Lung Cancer Screening          Today's Medication Changes          These changes are accurate as of: 5/8/17 11:59 PM.  If you have any questions, ask your nurse or doctor.               Start taking these medicines.        Dose/Directions    oxyCODONE 5 MG IR tablet   Commonly known as:  ROXICODONE   Used for:  Chronic pain syndrome   Started by:  Lisa Merino NP        Start taking on:  7/7/2017   Take 1-2 tablets by mouth. Every 4-6 hours as needed for pain  *Must last 30 days   Quantity:  150 tablet   Refills:  0            Where to get your medicines      These medications were sent to [id number] - [place], [place] - [address]  [address] , [facility] [address]    Hours:  test fax successfully sent 10/22/03 kr Phone:  [phone]     hydrOXYzine 25 MG tablet    pantoprazole 40 MG EC tablet    PARoxetine 20 MG tablet         Some of these will need a paper prescription and others can be bought over the counter.  Ask your nurse if you have questions.     Bring a paper prescription for each of these medications     oxyCODONE 5 MG IR tablet                Primary Care Provider Office Phone # Fax #    Lisa Merino [phone]       [facility] [address] [facility] [address]        Thank you!     Thank you for choosing [facility]  for your care. Our goal is always to provide you with excellent care. Hearing back from our patients is one way we can continue to improve our services. Please take a few minutes to complete the written survey that you may receive in the mail after your visit with us. Thank you!              Your Updated Medication List - Protect others around you: Learn how to safely use, store and throw away your medicines at www.disposemymeds.org.          This list is accurate as of: 5/8/17 11:59 PM.  Always use your most recent med list.                   Brand Name Dispense Instructions for use    albuterol 108 (90 BASE) MCG/ACT Inhaler   Generic drug:  albuterol     8.5 g    INHALE 2 PUFFS INTO THE LUNGS EVERY 6 HOURS AS NEEDED FOR SHORTNESS OF BREATH / DYSPNEA OR WHEEZING       Aspercreme 10 % cream   Generic drug:  trolamine salicylate      daily use x4 times       aspirin 325 MG EC tablet      1 TABLET daily       CALCIUM + D PO      BID       cetirizine HCl 10 MG Caps    ZYRTEC ALLERGY    90 capsule    Take 10 mg by mouth daily Take  by mouth.       fluticasone 50 MCG/ACT spray    FLONASE     Spray 2 sprays into both nostrils as needed       hydrOXYzine 25 MG tablet    ATARAX    30 tablet    Take 2 tablets (50 mg) by mouth nightly as needed for anxiety       methocarbamol 750 MG tablet    ROBAXIN    180 tablet    Take 1 tablet (750 mg) by mouth every 6 hours as needed for muscle spasms       MULTIVITAMIN TABS   OR      Take one daily       nicotine polacrilex 2 MG gum    NICORETTE STARTER KIT    30 tablet    Place 1 each (2 mg) inside cheek as needed for smoking cessation       oxyCODONE 5 MG IR tablet   Start taking on:  7/7/2017    ROXICODONE    150 tablet    Take 1-2 tablets by mouth. Every 4-6 hours as needed for pain  *Must last 30 days       pantoprazole 40 MG EC tablet    PROTONIX    90 tablet    Take 1 tablet (40 mg) by mouth daily       PARoxetine 20 MG tablet    PAXIL    45 tablet    Take 0.5 tablets (10 mg) by mouth daily       PROBIOTIC DAILY PO          simvastatin 20 MG tablet    ZOCOR    90 tablet    Take  by mouth. 1 TABLET AT BEDTIME

## 2017-05-08 NOTE — NURSING NOTE
"Chief Complaint   Patient presents with     Recheck Medication     Forms       Initial BP (!) 148/91  Pulse 75  Temp 97.5  F (36.4  C) (Oral)  Resp 18  Wt 133 lb 8 oz (60.6 kg)  SpO2 98%  BMI 24.82 kg/m2 Estimated body mass index is 24.82 kg/(m^2) as calculated from the following:    Height as of 2/6/17: 5' 1.5\" (1.562 m).    Weight as of this encounter: 133 lb 8 oz (60.6 kg).  Medication Reconciliation: complete           Emily Montes MA      "

## 2017-05-08 NOTE — PROGRESS NOTES
"  SUBJECTIVE:                                                    Shania Holliday is a 66 year old female who presents to clinic today for the following health issues:        Chronic Pain Follow-Up       Type / Location of Pain: Chronic pain. Constant pain in neck, joint pain, and lower back  Analgesia/pain control:       Recent changes:  same      Overall control: Comfortably manageable and Fully effective control  Activity level/function:      Daily activities:  Can do most things most days, with some rest    Work:  Unable to work  Adverse effects:  No  Adherance    Taking medication as directed?  Yes    Participating in other treatments: not applicable  Risk Factors:    Sleep:  Pt wakes up during the night with neck and back pain. \"Sometimes I have to take pain medication at night because it gets pretty uncomfortable\".     Mood/anxiety:  controlled    Recent family or social stressors: Son is going through and operation in a couple weeks     Other aggravating factors: prolonged sitting, prolonged standing, poor posture, sedentary lifestyle, clenching of jaw and repetitive activities - She cannot sleep on her back because of the nerve pain  PHQ-9 SCORE 8/3/2016 11/14/2016 5/8/2017   Total Score - - -   Total Score 1 0 2     NASRA-7 SCORE 7/9/2014 10/29/2015 11/14/2016   Total Score 0 - -   Total Score - 0 0     Encounter-Level CSA - 08/04/2015:                 Controlled Substance Agreement - Scan on 8/6/2015  2:50 PM : CONTROLLED SUBSTANCE AGREEMENT (below)                 Amount of exercise or physical activity: able to exercise with some help with medication. She is going swimming again    And grandson is teaching her how to bike again.     Problems taking medications regularly: No    Medication side effects: a little bit of constipation that improved after pt started taking a probiotic.     Diet: regular (no restrictions)        Problem list and histories reviewed & adjusted, as indicated.  Additional history: as " documented    Problem list, Medication list, Allergies, and Medical/Social/Surgical histories reviewed in EPIC and updated as appropriate.    ROS:  C: NEGATIVE for fever, chills, change in weight  E/M: NEGATIVE for ear, mouth and throat problems  R: NEGATIVE for significant cough or SOB  CV: NEGATIVE for chest pain, palpitations or peripheral edema  GI: NEGATIVE for nausea, abdominal pain, heartburn, or change in bowel habits  MUSCULOSKELETAL: see HPI  NEURO: NEGATIVE for weakness, dizziness or paresthesias  PSYCHIATRIC: NEGATIVE for changes in mood or affect    OBJECTIVE:                                                    /79  Pulse 75  Temp 97.5  F (36.4  C) (Oral)  Resp 18  Wt 133 lb 8 oz (60.6 kg)  SpO2 98%  BMI 24.82 kg/m2  Body mass index is 24.82 kg/(m^2).  GENERAL: healthy, alert and no distress  RESP: lungs clear to auscultation - no rales, rhonchi or wheezes  CV: regular rate and rhythm, normal S1 S2, no S3 or S4, no murmur, click or rub, no peripheral edema and peripheral pulses strong  MS: decreased ROM of the cervical and lumbar spine         ASSESSMENT/PLAN:                                                        (G89.4) Chronic pain syndrome  (primary encounter diagnosis)  Comment:   Plan: oxyCODONE (ROXICODONE) 5 MG IR tablet,         DISCONTINUED: oxyCODONE (ROXICODONE) 5 MG IR         tablet, DISCONTINUED: oxyCODONE (ROXICODONE) 5         MG IR tablet        Refills for 3 months given.  Follow up in 3 months.     (F33.42) Major depressive disorder, recurrent, in full remission (H)  Comment:   Plan: PARoxetine (PAXIL) 20 MG tablet        The current medical regimen is effective;  continue present plan and medications.     (F41.9) Anxiety  Comment:   Plan: hydrOXYzine (ATARAX) 25 MG tablet        The current medical regimen is effective;  continue present plan and medications.     (K21.9) Gastroesophageal reflux disease without esophagitis  Comment:   Plan: pantoprazole (PROTONIX) 40 MG EC  tablet        The current medical regimen is effective;  continue present plan and medications.       Lung Cancer Screening Shared Decision Making Visit     Shania Holliday is eligible for lung cancer screening on the basis of the information provided in my signed lung cancer screening order.     I have discussed with patient the risks and benefits of screening for lung cancer with low-dose CT.     The risks include:   radiation exposure    false positives     over-diagnosis    The benefit of early detection of lung cancer is contingent upon adherence to annual screening or more frequent follow up if indicated.     Furthermore, reaping the benefits of screening requires Shania Holliday to be willing and physically able to undergo diagnostic procedures, if indicated. Although no specific guide is available for determining severity of comorbidities, it is reasonable to withhold screening in patients who have greater mortality risk from other diseases.     We did discuss that the only way to prevent lung cancer is to not smoke. Smoking cessation assistance was offered.    I did offer risk estimation using a calculator such as this one:    ShouldIScreen

## 2017-05-09 ASSESSMENT — PATIENT HEALTH QUESTIONNAIRE - PHQ9: SUM OF ALL RESPONSES TO PHQ QUESTIONS 1-9: 2

## 2017-05-11 NOTE — PATIENT INSTRUCTIONS

## 2017-05-23 ENCOUNTER — TELEPHONE (OUTPATIENT)
Dept: GENERAL RADIOLOGY | Facility: CLINIC | Age: 67
End: 2017-05-23

## 2017-05-23 NOTE — TELEPHONE ENCOUNTER
Rx was denied for below medication/s:    Med Prescribed:  METHOCARBAMOL 750 MG  Reason:  PRODUCT NOT ON FORMULARY  Recommendations from Insurance:     Insurance Plan:  [employer]  Patient ID:  [id number]  BIN/RX#:  [id number]  Phone:  [phone]  Fax:       On current med list:  YES    Would you like to change Rx or start PA. If you would like to start PA please include any pertinent information as to why it is needed, other medications taken and reasons why other medication were discontinued.      Please forward to your MA pool.      Thank you,  Portia Castillo RT (R)(M)

## 2017-05-24 NOTE — TELEPHONE ENCOUNTER
Printed PA form from last year and changed the dates, also faxed appeal letter from 4/26/2017.    Waiting on Response    Emily Montes MA

## 2017-05-24 NOTE — TELEPHONE ENCOUNTER
Received response from [facility].    Methocarbamol tablet has been approved for coverage from 4/24/2017 until 5/24/2018.   I placed approval letter in abstraction.     Emily Montes MA

## 2017-06-08 ENCOUNTER — HOSPITAL ENCOUNTER (OUTPATIENT)
Dept: CT IMAGING | Facility: CLINIC | Age: 67
Discharge: HOME | End: 2017-06-08
Attending: NURSE PRACTITIONER | Admitting: NURSE PRACTITIONER
Payer: COMMERCIAL

## 2017-06-08 DIAGNOSIS — Z87.891 HISTORY OF TOBACCO USE: ICD-10-CM

## 2017-06-08 PROCEDURE — G0297 LDCT FOR LUNG CA SCREEN: HCPCS

## 2017-06-13 ENCOUNTER — TELEPHONE (OUTPATIENT)
Dept: FAMILY MEDICINE | Facility: CLINIC | Age: 67
End: 2017-06-13

## 2017-06-13 DIAGNOSIS — K76.9 LIVER LESION: Primary | ICD-10-CM

## 2017-07-25 ENCOUNTER — RADIANT APPOINTMENT (OUTPATIENT)
Dept: MRI IMAGING | Facility: CLINIC | Age: 67
End: 2017-07-25
Attending: NURSE PRACTITIONER
Payer: COMMERCIAL

## 2017-07-25 DIAGNOSIS — K76.9 LIVER LESION: ICD-10-CM

## 2017-07-25 PROCEDURE — 74183 MRI ABD W/O CNTR FLWD CNTR: CPT | Mod: TC

## 2017-07-25 PROCEDURE — A9585 GADOBUTROL INJECTION: HCPCS | Mod: JW | Performed by: NURSE PRACTITIONER

## 2017-07-25 RX ORDER — GADOBUTROL 604.72 MG/ML
7.5 INJECTION INTRAVENOUS ONCE
Status: COMPLETED | OUTPATIENT
Start: 2017-07-25 | End: 2017-07-25

## 2017-07-25 RX ADMIN — GADOBUTROL 6 ML: 604.72 INJECTION INTRAVENOUS at 11:30

## 2017-08-07 ENCOUNTER — OFFICE VISIT (OUTPATIENT)
Dept: FAMILY MEDICINE | Facility: CLINIC | Age: 67
End: 2017-08-07
Payer: COMMERCIAL

## 2017-08-07 VITALS
RESPIRATION RATE: 18 BRPM | WEIGHT: 138 LBS | HEART RATE: 70 BPM | SYSTOLIC BLOOD PRESSURE: 118 MMHG | TEMPERATURE: 97.8 F | OXYGEN SATURATION: 97 % | BODY MASS INDEX: 25.65 KG/M2 | DIASTOLIC BLOOD PRESSURE: 78 MMHG

## 2017-08-07 DIAGNOSIS — G89.4 CHRONIC PAIN SYNDROME: ICD-10-CM

## 2017-08-07 PROCEDURE — 99213 OFFICE O/P EST LOW 20 MIN: CPT | Performed by: NURSE PRACTITIONER

## 2017-08-07 RX ORDER — OXYCODONE HYDROCHLORIDE 5 MG/1
TABLET ORAL
Qty: 150 TABLET | Refills: 0 | Status: SHIPPED | OUTPATIENT
Start: 2017-09-01 | End: 2017-08-07

## 2017-08-07 RX ORDER — OXYCODONE HYDROCHLORIDE 5 MG/1
TABLET ORAL
Qty: 150 TABLET | Refills: 0 | Status: SHIPPED | OUTPATIENT
Start: 2017-08-07 | End: 2017-08-07

## 2017-08-07 RX ORDER — OXYCODONE HYDROCHLORIDE 5 MG/1
TABLET ORAL
Qty: 150 TABLET | Refills: 0 | Status: SHIPPED | OUTPATIENT
Start: 2017-10-06 | End: 2017-11-02

## 2017-08-07 NOTE — NURSING NOTE
"Chief Complaint   Patient presents with     Recheck Medication       Initial /78  Pulse 70  Temp 97.8  F (36.6  C) (Tympanic)  Resp 18  Wt 138 lb (62.6 kg)  SpO2 97%  BMI 25.65 kg/m2 Estimated body mass index is 25.65 kg/(m^2) as calculated from the following:    Height as of 2/6/17: 5' 1.5\" (1.562 m).    Weight as of this encounter: 138 lb (62.6 kg).  Medication Reconciliation: complete     Emily Montes MA      "

## 2017-08-07 NOTE — MR AVS SNAPSHOT
"              After Visit Summary   2017    Shania Holliday    MRN: [id number]           Patient Information     Date Of Birth          1950        Visit Information        Provider Department      2017 10:00 AM Lisa Merino NP [facility]        Today's Diagnoses     Chronic pain syndrome           Follow-ups after your visit        Who to contact     If you have questions or need follow up information about today's clinic visit or your schedule please contact [facility] directly at [phone].  Normal or non-critical lab and imaging results will be communicated to you by Snappy shuttlehart, letter or phone within 4 business days after the clinic has received the results. If you do not hear from us within 7 days, please contact the clinic through [employer] or phone. If you have a critical or abnormal lab result, we will notify you by phone as soon as possible.  Submit refill requests through [employer] or call your pharmacy and they will forward the refill request to us. Please allow 3 business days for your refill to be completed.          Additional Information About Your Visit        MyChart Information     [employer] lets you send messages to your doctor, view your test results, renew your prescriptions, schedule appointments and more. To sign up, go to [email or web address] . Click on \"Log in\" on the left side of the screen, which will take you to the Welcome page. Then click on \"Sign up Now\" on the right side of the page.     You will be asked to enter the access code listed below, as well as some personal information. Please follow the directions to create your username and password.     Your access code is: [email or web address]  Expires: 2017  7:45 AM     Your access code will  in 90 days. If you need help or a new code, please call your [facility] or [phone].        Care EveryWhere ID     This is your Care EveryWhere ID. This could be used by other " organizations to access your [facility] medical records  [id number]        Your Vitals Were     Pulse Temperature Respirations Pulse Oximetry BMI (Body Mass Index)       70 97.8  F (36.6  C) (Tympanic) 18 97% 25.65 kg/m2        Blood Pressure from Last 3 Encounters:   08/07/17 118/78   05/08/17 128/79   02/06/17 128/79    Weight from Last 3 Encounters:   08/07/17 138 lb (62.6 kg)   05/08/17 133 lb 8 oz (60.6 kg)   02/06/17 128 lb (58.1 kg)              Today, you had the following     No orders found for display         Today's Medication Changes          These changes are accurate as of: 8/7/17 12:58 PM.  If you have any questions, ask your nurse or doctor.               Start taking these medicines.        Dose/Directions    oxyCODONE 5 MG IR tablet   Commonly known as:  ROXICODONE   Used for:  Chronic pain syndrome   Started by:  Lisa Merino NP        Start taking on:  10/6/2017   Take 1-2 tablets by mouth. Every 4-6 hours as needed for pain  *Must last 30 days   Quantity:  150 tablet   Refills:  0            Where to get your medicines      Some of these will need a paper prescription and others can be bought over the counter.  Ask your nurse if you have questions.     Bring a paper prescription for each of these medications     oxyCODONE 5 MG IR tablet                Primary Care Provider Office Phone # Fax #    Lisa Merino [phone]       [facility] [address] [facility] [address]        Equal Access to Services     GREGORY BOYER AH: Hadii sean ku hadasho Soomaali, waaxda luqadaha, qaybta kaalmada adeegyada, waxay idivandana patricio. So [facility] [phone].    ATENCIÓN: Si habla español, tiene a sherwood disposición servicios gratuitos de asistencia lingüística. Llame al [phone].    We comply with applicable federal civil rights laws and [place] laws. We do not discriminate on the basis of race, color, national origin, age, disability sex,  sexual orientation or gender identity.            Thank you!     Thank you for choosing [facility]  for your care. Our goal is always to provide you with excellent care. Hearing back from our patients is one way we can continue to improve our services. Please take a few minutes to complete the written survey that you may receive in the mail after your visit with us. Thank you!             Your Updated Medication List - Protect others around you: Learn how to safely use, store and throw away your medicines at www.disposemymeds.org.          This list is accurate as of: 8/7/17 12:58 PM.  Always use your most recent med list.                   Brand Name Dispense Instructions for use Diagnosis    albuterol 108 (90 BASE) MCG/ACT Inhaler   Generic drug:  albuterol     8.5 g    INHALE 2 PUFFS INTO THE LUNGS EVERY 6 HOURS AS NEEDED FOR SHORTNESS OF BREATH / DYSPNEA OR WHEEZING    Tobacco abuse       Aspercreme 10 % cream   Generic drug:  trolamine salicylate      daily use x4 times        aspirin 325 MG EC tablet      1 TABLET daily        CALCIUM + D PO      BID        cetirizine HCl 10 MG Caps    ZYRTEC ALLERGY    90 capsule    Take 10 mg by mouth daily Take  by mouth.    Allergic rhinitis due to other allergen       fluticasone 50 MCG/ACT spray    FLONASE     Spray 2 sprays into both nostrils as needed        hydrOXYzine 25 MG tablet    ATARAX    30 tablet    Take 2 tablets (50 mg) by mouth nightly as needed for anxiety    Anxiety       methocarbamol 750 MG tablet    ROBAXIN    180 tablet    Take 1 tablet (750 mg) by mouth every 6 hours as needed for muscle spasms    Chronic pain syndrome       MULTIVITAMIN TABS   OR      Take one daily        nicotine polacrilex 2 MG gum    NICORETTE STARTER KIT    30 tablet    Place 1 each (2 mg) inside cheek as needed for smoking cessation    Tobacco abuse       oxyCODONE 5 MG IR tablet   Start taking on:  10/6/2017    ROXICODONE    150 tablet    Take 1-2 tablets  by mouth. Every 4-6 hours as needed for pain  *Must last 30 days    Chronic pain syndrome       pantoprazole 40 MG EC tablet    PROTONIX    90 tablet    Take 1 tablet (40 mg) by mouth daily    Gastroesophageal reflux disease without esophagitis       PARoxetine 20 MG tablet    PAXIL    45 tablet    Take 0.5 tablets (10 mg) by mouth daily    Major depressive disorder, recurrent, in full remission (H)       PROBIOTIC DAILY PO           simvastatin 20 MG tablet    ZOCOR    90 tablet    Take  by mouth. 1 TABLET AT BEDTIME    Hyperlipidemia LDL goal <130

## 2017-08-07 NOTE — PROGRESS NOTES
SUBJECTIVE:                                                    Shania Holliday is a 67 year old female who presents to clinic today for the following health issues:      Chronic Pain Follow-Up       Type / Location of Pain: Chronic pain   Analgesia/pain control:       Recent changes:  Same and Improved sleep after starting Atarax.       Overall control: Comfortably manageable  Activity level/function:      Daily activities:  Able to do light housework, cooking. Some things are difficult with the pain medication pt can get through it.    Work:  retired   Adverse effects:  No  Adherance    Taking medication as directed?  Yes    Participating in other treatments: no  Risk Factors:    Sleep:  Good    Mood/anxiety:  controlled    Recent family or social stressors:  none noted    Other aggravating factors: problems with Bunion  Terrible toe cramps at night and during day.   PHQ-9 SCORE 8/3/2016 11/14/2016 5/8/2017   Total Score - - -   Total Score 1 0 2     NASRA-7 SCORE 7/9/2014 10/29/2015 11/14/2016   Total Score 0 - -   Total Score - 0 0     Encounter-Level CSA - 08/04/2015:          Controlled Substance Agreement - Scan on 8/6/2015  2:50 PM : CONTROLLED SUBSTANCE AGREEMENT (below)                Amount of exercise or physical activity: none and watching grandson     Problems taking medications regularly: No    Medication side effects: none  Diet: regular (no restrictions)      Pain is stable with oxycodone, 5 tablets daily.     Problem list and histories reviewed & adjusted, as indicated.  Additional history: as documented        Reviewed and updated as needed this visit by clinical staffTobacco  Allergies  Meds  Med Hx  Surg Hx  Fam Hx  Soc Hx      Reviewed and updated as needed this visit by Provider         ROS:  C: NEGATIVE for fever, chills, change in weight  E/M: NEGATIVE for ear, mouth and throat problems  R: NEGATIVE for significant cough or SOB  CV: NEGATIVE for chest pain, palpitations or peripheral  edema  MUSCULOSKELETAL: chronic neck and back pain  NEURO: NEGATIVE for weakness, dizziness or paresthesias  PSYCHIATRIC: NEGATIVE for changes in mood or affect    OBJECTIVE:     /78  Pulse 70  Temp 97.8  F (36.6  C) (Tympanic)  Resp 18  Wt 138 lb (62.6 kg)  SpO2 97%  BMI 25.65 kg/m2  Body mass index is 25.65 kg/(m^2).  GENERAL: healthy, alert and no distress  RESP: lungs clear to auscultation - no rales, rhonchi or wheezes  CV: regular rate and rhythm, normal S1 S2, no S3 or S4, no murmur, click or rub, no peripheral edema and peripheral pulses strong  MS: decreased ROM of the cervical spine  PSYCH: mentation appears normal, affect normal/bright        ASSESSMENT/PLAN:             1. Chronic pain syndrome  Refills given for 3 months.    Follow up in 3 months.   - oxyCODONE (ROXICODONE) 5 MG IR tablet; Take 1-2 tablets by mouth. Every 4-6 hours as needed for pain   *Must last 30 days  Dispense: 150 tablet; Refill: 0        Lisa Merino NP  [facility]

## 2017-11-02 ENCOUNTER — OFFICE VISIT (OUTPATIENT)
Dept: FAMILY MEDICINE | Facility: CLINIC | Age: 67
End: 2017-11-02
Payer: COMMERCIAL

## 2017-11-02 VITALS
HEART RATE: 68 BPM | RESPIRATION RATE: 18 BRPM | BODY MASS INDEX: 26.07 KG/M2 | TEMPERATURE: 98 F | DIASTOLIC BLOOD PRESSURE: 82 MMHG | SYSTOLIC BLOOD PRESSURE: 137 MMHG | WEIGHT: 140.25 LBS | OXYGEN SATURATION: 98 %

## 2017-11-02 DIAGNOSIS — F17.200 TOBACCO USE DISORDER: ICD-10-CM

## 2017-11-02 DIAGNOSIS — G89.4 CHRONIC PAIN SYNDROME: Primary | ICD-10-CM

## 2017-11-02 DIAGNOSIS — Z23 NEED FOR PROPHYLACTIC VACCINATION AND INOCULATION AGAINST INFLUENZA: ICD-10-CM

## 2017-11-02 PROCEDURE — 99214 OFFICE O/P EST MOD 30 MIN: CPT | Mod: 25 | Performed by: NURSE PRACTITIONER

## 2017-11-02 PROCEDURE — 90471 IMMUNIZATION ADMIN: CPT | Performed by: NURSE PRACTITIONER

## 2017-11-02 PROCEDURE — 90662 IIV NO PRSV INCREASED AG IM: CPT | Performed by: NURSE PRACTITIONER

## 2017-11-02 RX ORDER — OXYCODONE HYDROCHLORIDE 5 MG/1
TABLET ORAL
Qty: 150 TABLET | Refills: 0 | Status: SHIPPED | OUTPATIENT
Start: 2017-12-04 | End: 2017-11-02

## 2017-11-02 RX ORDER — OXYCODONE HYDROCHLORIDE 5 MG/1
TABLET ORAL
Qty: 150 TABLET | Refills: 0 | Status: SHIPPED | OUTPATIENT
Start: 2017-11-04 | End: 2017-11-02

## 2017-11-02 RX ORDER — OXYCODONE HYDROCHLORIDE 5 MG/1
TABLET ORAL
Qty: 150 TABLET | Refills: 0 | Status: SHIPPED | OUTPATIENT
Start: 2018-01-03 | End: 2018-01-31

## 2017-11-02 ASSESSMENT — PATIENT HEALTH QUESTIONNAIRE - PHQ9: SUM OF ALL RESPONSES TO PHQ QUESTIONS 1-9: 1

## 2017-11-02 NOTE — PROGRESS NOTES
SUBJECTIVE:   Shania Holliday is a 67 year old female who presents to clinic today for the following health issues:        SUBJECTIVE:                                                    Shania Holliday is a 67 year old female who presents to clinic today for the following health issues:      Chronic Pain Follow-Up       Type / Location of Pain: Chronic pain   Analgesia/pain control:       Recent changes:  Same and Improved sleep after starting Atarax.       Overall control: Comfortably manageable  Activity level/function:      Daily activities:  Able to do light housework, cooking. Some things are difficult but with the pain medication pt can get through it on most days.    Work:  retired   Adverse effects:  No  Adherance    Taking medication as directed?  Yes    Participating in other treatments: no  Risk Factors:    Sleep:  Good    Mood/anxiety:  controlled    Recent family or social stressors:  none noted    Other aggravating factors: problems with Bunion  Terrible toe cramps at night and during day.   PHQ-9 SCORE 11/14/2016 5/8/2017 11/2/2017   Total Score - - -   Total Score 0 2 1     NASRA-7 SCORE 7/9/2014 10/29/2015 11/14/2016   Total Score 0 - -   Total Score - 0 0     Encounter-Level CSA - 08/04/2015:          Controlled Substance Agreement - Scan on 8/6/2015  2:50 PM : CONTROLLED SUBSTANCE AGREEMENT (below)                Amount of exercise or physical activity: none and watching grandson     Problems taking medications regularly: No    Medication side effects: none  Diet: regular (no restrictions)      Pain is stable with oxycodone, 5 tablets daily.     Problem list and histories reviewed & adjusted, as indicated.  Additional history: as documented        Reviewed and updated as needed this visit by clinical staffTobacco  Allergies  Meds  Med Hx  Surg Hx  Fam Hx  Soc Hx      Reviewed and updated as needed this visit by Provider         ROS:  C: NEGATIVE for fever, chills, change in weight  E/M: NEGATIVE  for ear, mouth and throat problems  R: NEGATIVE for significant cough or SOB  CV: NEGATIVE for chest pain, palpitations or peripheral edema  MUSCULOSKELETAL: chronic neck and back pain  NEURO: NEGATIVE for weakness, dizziness or paresthesias  PSYCHIATRIC: NEGATIVE for changes in mood or affect    OBJECTIVE:     /82  Pulse 68  Temp 98  F (36.7  C) (Oral)  Resp 18  Wt 140 lb 4 oz (63.6 kg)  SpO2 98%  BMI 26.07 kg/m2  Body mass index is 26.07 kg/(m^2).  GENERAL: healthy, alert and no distress  RESP: lungs clear to auscultation - no rales, rhonchi or wheezes  CV: regular rate and rhythm, normal S1 S2, no S3 or S4, no murmur, click or rub, no peripheral edema and peripheral pulses strong  MS: decreased ROM of the cervical spine  PSYCH: mentation appears normal, affect normal/bright        ASSESSMENT/PLAN:             1. Chronic pain syndrome  Refills given for 3 months.    Follow up in 3 months.   - oxyCODONE (ROXICODONE) 5 MG IR tablet; Take 1-2 tablets by mouth. Every 4-6 hours as needed for pain   *Must last 30 days  Dispense: 150 tablet; Refill: 0    Tobacco Use Disorder  Plan:  She is working with [employer], trying to get nicotine lozenges approved.   Encouraged to continue to try to quit smoking.         Lisa Merino NP  [facility]    Injectable Influenza Immunization Documentation    1.  Is the person to be vaccinated sick today?   No    2. Does the person to be vaccinated have an allergy to a component   of the vaccine?   No  Egg Allergy Algorithm Link    3. Has the person to be vaccinated ever had a serious reaction   to influenza vaccine in the past?   No    4. Has the person to be vaccinated ever had Guillain-Barré syndrome?   No    Form completed by

## 2017-11-02 NOTE — MR AVS SNAPSHOT
"              After Visit Summary   2017    Shania Holliday    MRN: [id number]           Patient Information     Date Of Birth          1950        Visit Information        Provider Department      2017 10:00 AM Lisa Merino NP [facility]        Today's Diagnoses     Chronic pain syndrome    -  1    Tobacco use disorder        Need for prophylactic vaccination and inoculation against influenza           Follow-ups after your visit        Who to contact     If you have questions or need follow up information about today's clinic visit or your schedule please contact [facility] directly at [phone].  Normal or non-critical lab and imaging results will be communicated to you by MyChart, letter or phone within 4 business days after the clinic has received the results. If you do not hear from us within 7 days, please contact the clinic through [employer] or phone. If you have a critical or abnormal lab result, we will notify you by phone as soon as possible.  Submit refill requests through [employer] or call your pharmacy and they will forward the refill request to us. Please allow 3 business days for your refill to be completed.          Additional Information About Your Visit        MyChart Information     [employer] lets you send messages to your doctor, view your test results, renew your prescriptions, schedule appointments and more. To sign up, go to [email or web address] . Click on \"Log in\" on the left side of the screen, which will take you to the Welcome page. Then click on \"Sign up Now\" on the right side of the page.     You will be asked to enter the access code listed below, as well as some personal information. Please follow the directions to create your username and password.     Your access code is: [email or web address]  Expires: 2/3/2018 10:09 PM     Your access code will  in 90 days. If you need help or a new code, please call your [facility] or " [phone].        Care EveryWhere ID     This is your Care EveryWhere ID. This could be used by other organizations to access your [facility] medical records  [id number]        Your Vitals Were     Pulse Temperature Respirations Pulse Oximetry BMI (Body Mass Index)       68 98  F (36.7  C) (Oral) 18 98% 26.07 kg/m2        Blood Pressure from Last 3 Encounters:   11/02/17 137/82   08/07/17 118/78   05/08/17 128/79    Weight from Last 3 Encounters:   11/02/17 140 lb 4 oz (63.6 kg)   08/07/17 138 lb (62.6 kg)   05/08/17 133 lb 8 oz (60.6 kg)              We Performed the Following     FLU VACCINE, INCREASED ANTIGEN, PRESV FREE          Today's Medication Changes          These changes are accurate as of: 11/2/17 11:59 PM.  If you have any questions, ask your nurse or doctor.               Start taking these medicines.        Dose/Directions    oxyCODONE IR 5 MG tablet   Commonly known as:  ROXICODONE   Used for:  Chronic pain syndrome   Started by:  Lisa Merino NP        Start taking on:  1/3/2018   Take 1-2 tablets by mouth. Every 4-6 hours as needed for pain  *Must last 30 days   Quantity:  150 tablet   Refills:  0            Where to get your medicines      Some of these will need a paper prescription and others can be bought over the counter.  Ask your nurse if you have questions.     Bring a paper prescription for each of these medications     oxyCODONE IR 5 MG tablet                Primary Care Provider Office Phone # Fax #    Lisa Merino [phone] [address] [facility] [address]        Equal Access to Services     [facility] Hospital[facility] AH: Hadii aad ku hadashadam Soniall, waaxda luqadaha, qaybta kaalmada salbador, aakash patricio. So [facility] [phone].    ATENCIÓN: Si habla español, tiene a sherwood disposición servicios gratuitos de asistencia lingüística. Llame al [phone].    We comply with applicable federal civil rights laws and [place] laws.  We do not discriminate on the basis of race, color, national origin, age, disability, sex, sexual orientation, or gender identity.            Thank you!     Thank you for choosing [facility]  for your care. Our goal is always to provide you with excellent care. Hearing back from our patients is one way we can continue to improve our services. Please take a few minutes to complete the written survey that you may receive in the mail after your visit with us. Thank you!             Your Updated Medication List - Protect others around you: Learn how to safely use, store and throw away your medicines at www.disposemymeds.org.          This list is accurate as of: 11/2/17 11:59 PM.  Always use your most recent med list.                   Brand Name Dispense Instructions for use Diagnosis    Aspercreme 10 % cream   Generic drug:  trolamine salicylate      daily use x4 times        aspirin 325 MG EC tablet      2 TABLET daily        CALCIUM + D PO      BID        cetirizine HCl 10 MG Caps    ZYRTEC ALLERGY    90 capsule    Take 10 mg by mouth daily Take  by mouth.    Allergic rhinitis due to other allergen       fluticasone 50 MCG/ACT spray    FLONASE     Spray 2 sprays into both nostrils as needed        hydrOXYzine 25 MG tablet    ATARAX    30 tablet    Take 2 tablets (50 mg) by mouth nightly as needed for anxiety    Anxiety       methocarbamol 750 MG tablet    ROBAXIN    180 tablet    Take 1 tablet (750 mg) by mouth every 6 hours as needed for muscle spasms    Chronic pain syndrome       MULTIVITAMIN TABS   OR      Take one daily        nicotine polacrilex 2 MG gum    NICORETTE STARTER KIT    30 tablet    Place 1 each (2 mg) inside cheek as needed for smoking cessation    Tobacco abuse       oxyCODONE IR 5 MG tablet   Start taking on:  1/3/2018    ROXICODONE    150 tablet    Take 1-2 tablets by mouth. Every 4-6 hours as needed for pain  *Must last 30 days    Chronic pain syndrome       pantoprazole 40  MG EC tablet    PROTONIX    90 tablet    Take 1 tablet (40 mg) by mouth daily    Gastroesophageal reflux disease without esophagitis       PARoxetine 20 MG tablet    PAXIL    45 tablet    Take 0.5 tablets (10 mg) by mouth daily    Major depressive disorder, recurrent, in full remission (H)       PROAIR  (90 BASE) MCG/ACT Inhaler   Generic drug:  albuterol     8.5 g    INHALE 2 PUFFS INTO THE LUNGS EVERY 6 HOURS AS NEEDED FOR SHORTNESS OF BREATH / DYSPNEA OR WHEEZING    Tobacco abuse       PROBIOTIC DAILY PO           simvastatin 20 MG tablet    ZOCOR    90 tablet    Take  by mouth. 1 TABLET AT BEDTIME    Hyperlipidemia LDL goal <130

## 2017-11-02 NOTE — NURSING NOTE
"No chief complaint on file.      Initial /82  Pulse 68  Temp 98  F (36.7  C) (Oral)  Resp 18  Wt 140 lb 4 oz (63.6 kg)  SpO2 98%  BMI 26.07 kg/m2 Estimated body mass index is 26.07 kg/(m^2) as calculated from the following:    Height as of 2/6/17: 5' 1.5\" (1.562 m).    Weight as of this encounter: 140 lb 4 oz (63.6 kg).  Medication Reconciliation: complete     Emily Montes MA      "

## 2018-01-31 ENCOUNTER — OFFICE VISIT (OUTPATIENT)
Dept: FAMILY MEDICINE | Facility: CLINIC | Age: 68
End: 2018-01-31
Payer: COMMERCIAL

## 2018-01-31 VITALS
RESPIRATION RATE: 20 BRPM | HEIGHT: 62 IN | BODY MASS INDEX: 25.21 KG/M2 | OXYGEN SATURATION: 98 % | DIASTOLIC BLOOD PRESSURE: 60 MMHG | HEART RATE: 78 BPM | SYSTOLIC BLOOD PRESSURE: 124 MMHG | WEIGHT: 137 LBS | TEMPERATURE: 97.8 F

## 2018-01-31 DIAGNOSIS — F33.42 MAJOR DEPRESSIVE DISORDER, RECURRENT, IN FULL REMISSION (H): ICD-10-CM

## 2018-01-31 DIAGNOSIS — E78.5 HYPERLIPIDEMIA LDL GOAL <130: ICD-10-CM

## 2018-01-31 DIAGNOSIS — G89.4 CHRONIC PAIN SYNDROME: Primary | ICD-10-CM

## 2018-01-31 PROCEDURE — 99214 OFFICE O/P EST MOD 30 MIN: CPT | Performed by: NURSE PRACTITIONER

## 2018-01-31 RX ORDER — METHOCARBAMOL 750 MG/1
750 TABLET, FILM COATED ORAL EVERY 6 HOURS PRN
Qty: 180 TABLET | Status: SHIPPED | OUTPATIENT
Start: 2018-01-31 | End: 2019-02-06

## 2018-01-31 RX ORDER — OXYCODONE HYDROCHLORIDE 5 MG/1
TABLET ORAL
Qty: 150 TABLET | Refills: 0 | Status: SHIPPED | OUTPATIENT
Start: 2018-04-01 | End: 2018-04-25

## 2018-01-31 RX ORDER — OXYCODONE HYDROCHLORIDE 5 MG/1
TABLET ORAL
Qty: 150 TABLET | Refills: 0 | Status: SHIPPED | OUTPATIENT
Start: 2018-03-02 | End: 2018-01-31

## 2018-01-31 RX ORDER — SIMVASTATIN 20 MG
TABLET ORAL
Qty: 90 TABLET | Refills: 3 | Status: SHIPPED | OUTPATIENT
Start: 2018-01-31 | End: 2019-02-06

## 2018-01-31 RX ORDER — PAROXETINE 20 MG/1
10 TABLET, FILM COATED ORAL DAILY
Qty: 45 TABLET | Status: SHIPPED | OUTPATIENT
Start: 2018-01-31 | End: 2019-02-06

## 2018-01-31 RX ORDER — OXYCODONE HYDROCHLORIDE 5 MG/1
TABLET ORAL
Qty: 150 TABLET | Refills: 0 | Status: SHIPPED | OUTPATIENT
Start: 2018-01-31 | End: 2018-01-31

## 2018-01-31 ASSESSMENT — ANXIETY QUESTIONNAIRES
1. FEELING NERVOUS, ANXIOUS, OR ON EDGE: NOT AT ALL
7. FEELING AFRAID AS IF SOMETHING AWFUL MIGHT HAPPEN: NOT AT ALL
2. NOT BEING ABLE TO STOP OR CONTROL WORRYING: NOT AT ALL
GAD7 TOTAL SCORE: 0
3. WORRYING TOO MUCH ABOUT DIFFERENT THINGS: NOT AT ALL
6. BECOMING EASILY ANNOYED OR IRRITABLE: NOT AT ALL
5. BEING SO RESTLESS THAT IT IS HARD TO SIT STILL: NOT AT ALL

## 2018-01-31 ASSESSMENT — PATIENT HEALTH QUESTIONNAIRE - PHQ9: 5. POOR APPETITE OR OVEREATING: NOT AT ALL

## 2018-01-31 NOTE — PROGRESS NOTES
SUBJECTIVE:   Shania Holliday is a 67 year old female who presents to clinic today for the following health issues:        SUBJECTIVE:                                                    Shania Holliday is a 67 year old female who presents to clinic today for the following health issues:      Chronic Pain Follow-Up       Type / Location of Pain: Chronic pain   Analgesia/pain control:       Recent changes:  Same and Improved sleep after starting Atarax.       Overall control: Comfortably manageable  Activity level/function:      Daily activities:  Able to do light housework, cooking. Some things are difficult but with the pain medication pt can get through it on most days.    Work:  retired   Adverse effects:  No  Adherance    Taking medication as directed?  Yes    Participating in other treatments: no  Risk Factors:    Sleep:  Good    Mood/anxiety:  controlled    Recent family or social stressors:  none noted    Other aggravating factors: problems with Bunion  Terrible toe cramps at night and during day.   PHQ-9 SCORE 5/8/2017 11/2/2017 1/31/2018   Total Score - - -   Total Score 2 1 0     NASRA-7 SCORE 10/29/2015 11/14/2016 1/31/2018   Total Score - - -   Total Score 0 0 0     Encounter-Level CSA - 08/04/2015:          Controlled Substance Agreement - Scan on 8/6/2015  2:50 PM : CONTROLLED SUBSTANCE AGREEMENT (below)                Amount of exercise or physical activity: none and watching grandson     Problems taking medications regularly: No    Medication side effects: none  Diet: regular (no restrictions)      Pain is stable with oxycodone, 5 tablets daily.     Problem list and histories reviewed & adjusted, as indicated.  Additional history: as documented        Reviewed and updated as needed this visit by clinical staffTobacco  Allergies  Meds  Med Hx  Surg Hx  Fam Hx  Soc Hx      Reviewed and updated as needed this visit by Provider         ROS:  C: NEGATIVE for fever, chills, change in weight  E/M: NEGATIVE  "for ear, mouth and throat problems  R: NEGATIVE for significant cough or SOB  CV: NEGATIVE for chest pain, palpitations or peripheral edema  MUSCULOSKELETAL: chronic neck and back pain  NEURO: NEGATIVE for weakness, dizziness or paresthesias  PSYCHIATRIC: NEGATIVE for changes in mood or affect    OBJECTIVE:     /60  Pulse 78  Temp 97.8  F (36.6  C) (Oral)  Resp 20  Ht 5' 1.5\" (1.562 m)  Wt 137 lb (62.1 kg)  SpO2 98%  Breastfeeding? No  BMI 25.47 kg/m2  Body mass index is 25.47 kg/(m^2).  GENERAL: healthy, alert and no distress  HEENT: TMs clear bilaterally  RESP: lungs clear to auscultation - no rales, rhonchi or wheezes  CV: regular rate and rhythm, normal S1 S2, no S3 or S4, no murmur, click or rub, no peripheral edema and peripheral pulses strong  MS: decreased ROM of the cervical spine  PSYCH: mentation appears normal, affect normal/bright        ASSESSMENT/PLAN:             1. Chronic pain syndrome  Refills given for 3 months.    Follow up in 3 months.   - oxyCODONE (ROXICODONE) 5 MG IR tablet; Take 1-2 tablets by mouth. Every 4-6 hours as needed for pain   *Must last 30 days  Dispense: 150 tablet; Refill: 0    Tobacco Use Disorder  Plan:  She is working with [employer], trying to get nicotine lozenges approved.   Encouraged to continue to try to quit smoking.     She will schedule a physical at her next visit.     Lisa Merino NP  [facility]              "

## 2018-01-31 NOTE — MR AVS SNAPSHOT
"              After Visit Summary   2018    Shania Holliday    MRN: [id number]           Patient Information     Date Of Birth          1950        Visit Information        Provider Department      2018 10:30 AM Lisa Merino NP [facility]        Today's Diagnoses     Chronic pain syndrome    -  1    Major depressive disorder, recurrent, in full remission (H)        Hyperlipidemia LDL goal <130           Follow-ups after your visit        Who to contact     If you have questions or need follow up information about today's clinic visit or your schedule please contact [facility] directly at [phone].  Normal or non-critical lab and imaging results will be communicated to you by CiraNovahart, letter or phone within 4 business days after the clinic has received the results. If you do not hear from us within 7 days, please contact the clinic through CiraNovahart or phone. If you have a critical or abnormal lab result, we will notify you by phone as soon as possible.  Submit refill requests through AlertMe or call your pharmacy and they will forward the refill request to us. Please allow 3 business days for your refill to be completed.          Additional Information About Your Visit        MyChart Information     AlertMe lets you send messages to your doctor, view your test results, renew your prescriptions, schedule appointments and more. To sign up, go to [email or web address] . Click on \"Log in\" on the left side of the screen, which will take you to the Welcome page. Then click on \"Sign up Now\" on the right side of the page.     You will be asked to enter the access code listed below, as well as some personal information. Please follow the directions to create your username and password.     Your access code is: [email or web address]  Expires: 2/3/2018 10:09 PM     Your access code will  in 90 days. If you need help or a new code, please call your [facility] or " "[phone].        Care EveryWhere ID     This is your Care EveryWhere ID. This could be used by other organizations to access your [facility] medical records  [id number]        Your Vitals Were     Pulse Temperature Respirations Height Pulse Oximetry Breastfeeding?    78 97.8  F (36.6  C) (Oral) 20 5' 1.5\" (1.562 m) 98% No    BMI (Body Mass Index)                   25.47 kg/m2            Blood Pressure from Last 3 Encounters:   01/31/18 124/60   11/02/17 137/82   08/07/17 118/78    Weight from Last 3 Encounters:   01/31/18 137 lb (62.1 kg)   11/02/17 140 lb 4 oz (63.6 kg)   08/07/17 138 lb (62.6 kg)              Today, you had the following     No orders found for display         Today's Medication Changes          These changes are accurate as of 1/31/18 12:13 PM.  If you have any questions, ask your nurse or doctor.               Start taking these medicines.        Dose/Directions    oxyCODONE IR 5 MG tablet   Commonly known as:  ROXICODONE   Used for:  Chronic pain syndrome   Started by:  Lisa Merino NP        Start taking on:  4/1/2018   Take 1-2 tablets by mouth. Every 4-6 hours as needed for pain  *Must last 30 days   Quantity:  150 tablet   Refills:  0            Where to get your medicines      These medications were sent to [id number] - [place], [place] - [address]  [address] , [facility] [address]    Hours:  test fax successfully sent 10/22/03 kr Phone:  [phone]     methocarbamol 750 MG tablet    PARoxetine 20 MG tablet    simvastatin 20 MG tablet         Some of these will need a paper prescription and others can be bought over the counter.  Ask your nurse if you have questions.     Bring a paper prescription for each of these medications     oxyCODONE IR 5 MG tablet                Primary Care Provider Office Phone # Fax #    Lisa Merino [phone] [address] [facility] [address]        Equal Access to Services     " NANO [facility]: Hadii aad ku thu [place], waaxda luqadaha, qaybta kaalmada adegurmeet, aakash salas kavehjaya fox santiagomich mittal . So [facility] [phone].    ATENCIÓN: Si habla vero, tiene a sherwood disposición servicios gratuitos de asistencia lingüística. Jamesame al [phone].    We comply with applicable federal civil rights laws and [place] laws. We do not discriminate on the basis of race, color, national origin, age, disability, sex, sexual orientation, or gender identity.            Thank you!     Thank you for choosing [facility]  for your care. Our goal is always to provide you with excellent care. Hearing back from our patients is one way we can continue to improve our services. Please take a few minutes to complete the written survey that you may receive in the mail after your visit with us. Thank you!             Your Updated Medication List - Protect others around you: Learn how to safely use, store and throw away your medicines at www.disposemymeds.org.          This list is accurate as of 1/31/18 12:13 PM.  Always use your most recent med list.                   Brand Name Dispense Instructions for use Diagnosis    Aspercreme 10 % cream   Generic drug:  trolamine salicylate      daily use x4 times        aspirin 325 MG EC tablet      2 TABLET daily        CALCIUM + D PO      BID        cetirizine HCl 10 MG Caps    ZYRTEC ALLERGY    90 capsule    Take 10 mg by mouth daily Take  by mouth.    Allergic rhinitis due to other allergen       fluticasone 50 MCG/ACT spray    FLONASE     Spray 2 sprays into both nostrils as needed        hydrOXYzine 25 MG tablet    ATARAX    30 tablet    Take 2 tablets (50 mg) by mouth nightly as needed for anxiety    Anxiety       methocarbamol 750 MG tablet    ROBAXIN    180 tablet    Take 1 tablet (750 mg) by mouth every 6 hours as needed for muscle spasms    Chronic pain syndrome       MULTIVITAMIN TABS   OR      Take one daily        nicotine  polacrilex 2 MG gum    NICORETTE STARTER KIT    30 tablet    Place 1 each (2 mg) inside cheek as needed for smoking cessation    Tobacco abuse       oxyCODONE IR 5 MG tablet   Start taking on:  4/1/2018    ROXICODONE    150 tablet    Take 1-2 tablets by mouth. Every 4-6 hours as needed for pain  *Must last 30 days    Chronic pain syndrome       pantoprazole 40 MG EC tablet    PROTONIX    90 tablet    Take 1 tablet (40 mg) by mouth daily    Gastroesophageal reflux disease without esophagitis       PARoxetine 20 MG tablet    PAXIL    45 tablet    Take 0.5 tablets (10 mg) by mouth daily    Major depressive disorder, recurrent, in full remission (H)       PROAIR  (90 BASE) MCG/ACT Inhaler   Generic drug:  albuterol     8.5 g    INHALE 2 PUFFS INTO THE LUNGS EVERY 6 HOURS AS NEEDED FOR SHORTNESS OF BREATH / DYSPNEA OR WHEEZING    Tobacco abuse       PROBIOTIC DAILY PO           simvastatin 20 MG tablet    ZOCOR    90 tablet    Take  by mouth. 1 TABLET AT BEDTIME    Hyperlipidemia LDL goal <130

## 2018-01-31 NOTE — NURSING NOTE
"Chief Complaint   Patient presents with     Medication Request       Initial /60  Pulse 78  Temp 97.8  F (36.6  C) (Oral)  Resp 20  Ht 5' 1.5\" (1.562 m)  Wt 137 lb (62.1 kg)  SpO2 98%  Breastfeeding? No  BMI 25.47 kg/m2 Estimated body mass index is 25.47 kg/(m^2) as calculated from the following:    Height as of this encounter: 5' 1.5\" (1.562 m).    Weight as of this encounter: 137 lb (62.1 kg).  Medication Reconciliation: complete     Brain Bowman MA       "

## 2018-02-01 ASSESSMENT — ANXIETY QUESTIONNAIRES: GAD7 TOTAL SCORE: 0

## 2018-02-01 ASSESSMENT — PATIENT HEALTH QUESTIONNAIRE - PHQ9: SUM OF ALL RESPONSES TO PHQ QUESTIONS 1-9: 0

## 2018-04-25 ENCOUNTER — OFFICE VISIT (OUTPATIENT)
Dept: FAMILY MEDICINE | Facility: CLINIC | Age: 68
End: 2018-04-25
Payer: COMMERCIAL

## 2018-04-25 VITALS
HEIGHT: 62 IN | WEIGHT: 142 LBS | SYSTOLIC BLOOD PRESSURE: 131 MMHG | OXYGEN SATURATION: 97 % | BODY MASS INDEX: 26.13 KG/M2 | HEART RATE: 63 BPM | DIASTOLIC BLOOD PRESSURE: 77 MMHG | TEMPERATURE: 97.2 F | RESPIRATION RATE: 16 BRPM

## 2018-04-25 DIAGNOSIS — Z23 NEED FOR PNEUMOCOCCAL VACCINATION: ICD-10-CM

## 2018-04-25 DIAGNOSIS — E78.5 HYPERLIPIDEMIA LDL GOAL <130: ICD-10-CM

## 2018-04-25 DIAGNOSIS — Z00.00 ENCOUNTER FOR ROUTINE ADULT HEALTH EXAMINATION WITHOUT ABNORMAL FINDINGS: Primary | ICD-10-CM

## 2018-04-25 DIAGNOSIS — Z51.81 ENCOUNTER FOR THERAPEUTIC DRUG MONITORING: ICD-10-CM

## 2018-04-25 DIAGNOSIS — K21.9 GASTROESOPHAGEAL REFLUX DISEASE WITHOUT ESOPHAGITIS: ICD-10-CM

## 2018-04-25 DIAGNOSIS — J43.9 PULMONARY EMPHYSEMA, UNSPECIFIED EMPHYSEMA TYPE (H): ICD-10-CM

## 2018-04-25 DIAGNOSIS — G89.4 CHRONIC PAIN SYNDROME: ICD-10-CM

## 2018-04-25 LAB
ALBUMIN SERPL-MCNC: 4 G/DL (ref 3.4–5)
ALP SERPL-CCNC: 67 U/L (ref 40–150)
ALT SERPL W P-5'-P-CCNC: 21 U/L (ref 0–50)
ANION GAP SERPL CALCULATED.3IONS-SCNC: 8 MMOL/L (ref 3–14)
AST SERPL W P-5'-P-CCNC: 17 U/L (ref 0–45)
BILIRUB SERPL-MCNC: 0.4 MG/DL (ref 0.2–1.3)
BUN SERPL-MCNC: 7 MG/DL (ref 7–30)
CALCIUM SERPL-MCNC: 9.3 MG/DL (ref 8.5–10.1)
CHLORIDE SERPL-SCNC: 103 MMOL/L (ref 94–109)
CHOLEST SERPL-MCNC: 178 MG/DL
CO2 SERPL-SCNC: 26 MMOL/L (ref 20–32)
CREAT SERPL-MCNC: 0.8 MG/DL (ref 0.52–1.04)
GFR SERPL CREATININE-BSD FRML MDRD: 72 ML/MIN/1.7M2
GLUCOSE SERPL-MCNC: 98 MG/DL (ref 70–99)
HDLC SERPL-MCNC: 73 MG/DL
LDLC SERPL CALC-MCNC: 93 MG/DL
NONHDLC SERPL-MCNC: 105 MG/DL
POTASSIUM SERPL-SCNC: 4.5 MMOL/L (ref 3.4–5.3)
PROT SERPL-MCNC: 7.3 G/DL (ref 6.8–8.8)
SODIUM SERPL-SCNC: 137 MMOL/L (ref 133–144)
TRIGL SERPL-MCNC: 58 MG/DL
TSH SERPL DL<=0.005 MIU/L-ACNC: 1.67 MU/L (ref 0.4–4)

## 2018-04-25 PROCEDURE — 84443 ASSAY THYROID STIM HORMONE: CPT | Performed by: NURSE PRACTITIONER

## 2018-04-25 PROCEDURE — G0439 PPPS, SUBSEQ VISIT: HCPCS | Performed by: NURSE PRACTITIONER

## 2018-04-25 PROCEDURE — 80053 COMPREHEN METABOLIC PANEL: CPT | Performed by: NURSE PRACTITIONER

## 2018-04-25 PROCEDURE — 80061 LIPID PANEL: CPT | Performed by: NURSE PRACTITIONER

## 2018-04-25 PROCEDURE — 90732 PPSV23 VACC 2 YRS+ SUBQ/IM: CPT | Performed by: NURSE PRACTITIONER

## 2018-04-25 PROCEDURE — G0009 ADMIN PNEUMOCOCCAL VACCINE: HCPCS | Performed by: NURSE PRACTITIONER

## 2018-04-25 PROCEDURE — 36415 COLL VENOUS BLD VENIPUNCTURE: CPT | Performed by: NURSE PRACTITIONER

## 2018-04-25 RX ORDER — OXYCODONE HYDROCHLORIDE 5 MG/1
TABLET ORAL
Qty: 150 TABLET | Refills: 0 | Status: SHIPPED | OUTPATIENT
Start: 2018-05-31 | End: 2018-04-25

## 2018-04-25 RX ORDER — OXYCODONE HYDROCHLORIDE 5 MG/1
TABLET ORAL
Qty: 150 TABLET | Refills: 0 | Status: SHIPPED | OUTPATIENT
Start: 2018-06-30 | End: 2018-07-19

## 2018-04-25 RX ORDER — PANTOPRAZOLE SODIUM 40 MG/1
40 TABLET, DELAYED RELEASE ORAL DAILY
Qty: 90 TABLET | Status: SHIPPED | OUTPATIENT
Start: 2018-04-25 | End: 2019-05-23

## 2018-04-25 RX ORDER — OXYCODONE HYDROCHLORIDE 5 MG/1
TABLET ORAL
Qty: 150 TABLET | Refills: 0 | Status: SHIPPED | OUTPATIENT
Start: 2018-05-01 | End: 2018-04-25

## 2018-04-25 ASSESSMENT — ACTIVITIES OF DAILY LIVING (ADL)
I_NEED_ASSISTANCE_FOR_THE_FOLLOWING_DAILY_ACTIVITIES:: NO ASSISTANCE IS NEEDED
CURRENT_FUNCTION: NO ASSISTANCE NEEDED

## 2018-04-25 NOTE — MR AVS SNAPSHOT
After Visit Summary   4/25/2018    Shania Holliday    MRN: [id number]           Patient Information     Date Of Birth          1950        Visit Information        Provider Department      4/25/2018 10:15 AM Lisa Merino NP [facility]        Today's Diagnoses     Encounter for routine adult health examination without abnormal findings    -  1    Chronic pain syndrome        Gastroesophageal reflux disease without esophagitis        Need for pneumococcal vaccination        Hyperlipidemia LDL goal <130        Encounter for therapeutic drug monitoring          Care Instructions      Preventive Health Recommendations    Female Ages 65 +    Yearly exam:     See your health care provider every year in order to  o Review health changes.   o Discuss preventive care.    o Review your medicines if your doctor has prescribed any.      You no longer need a yearly Pap test unless you've had an abnormal Pap test in the past 10 years. If you have vaginal symptoms, such as bleeding or discharge, be sure to talk with your provider about a Pap test.      Every 1 to 2 years, have a mammogram.  If you are over 69, talk with your health care provider about whether or not you want to continue having screening mammograms.      Every 10 years, have a colonoscopy. Or, have a yearly FIT test (stool test). These exams will check for colon cancer.       Have a cholesterol test every 5 years, or more often if your doctor advises it.       Have a diabetes test (fasting glucose) every three years. If you are at risk for diabetes, you should have this test more often.       At age 65, have a bone density scan (DEXA) to check for osteoporosis (brittle bone disease).    Shots:    Get a flu shot each year.    Get a tetanus shot every 10 years.    Talk to your doctor about your pneumonia vaccines. There are now two you should receive - Pneumovax (PPSV 23) and Prevnar (PCV 13).    Talk to your doctor about the  "shingles vaccine.    Talk to your doctor about the hepatitis B vaccine.    Nutrition:     Eat at least 5 servings of fruits and vegetables each day.      Eat whole-grain bread, whole-wheat pasta and brown rice instead of white grains and rice.      Talk to your provider about Calcium and Vitamin D.     Lifestyle    Exercise at least 150 minutes a week (30 minutes a day, 5 days a week). This will help you control your weight and prevent disease.      Limit alcohol to one drink per day.      No smoking.       Wear sunscreen to prevent skin cancer.       See your dentist twice a year for an exam and cleaning.      See your eye doctor every 1 to 2 years to screen for conditions such as glaucoma, macular degeneration and cataracts.          Follow-ups after your visit        Who to contact     If you have questions or need follow up information about today's clinic visit or your schedule please contact [facility] directly at [phone].  Normal or non-critical lab and imaging results will be communicated to you by MyChart, letter or phone within 4 business days after the clinic has received the results. If you do not hear from us within 7 days, please contact the clinic through Dang Lehart or phone. If you have a critical or abnormal lab result, we will notify you by phone as soon as possible.  Submit refill requests through Health Elements or call your pharmacy and they will forward the refill request to us. Please allow 3 business days for your refill to be completed.          Additional Information About Your Visit        Dang LeharStemnion Information     Health Elements lets you send messages to your doctor, view your test results, renew your prescriptions, schedule appointments and more. To sign up, go to [email or web address] . Click on \"Log in\" on the left side of the screen, which will take you to the Welcome page. Then click on \"Sign up Now\" on the right side of the page.     You will be asked to enter the access " "code listed below, as well as some personal information. Please follow the directions to create your username and password.     Your access code is: [email or web address]  Expires: 2018 11:21 AM     Your access code will  in 90 days. If you need help or a new code, please call your [facility] clinic or [phone].        Care EveryWhere ID     This is your Care EveryWhere ID. This could be used by other organizations to access your [facility] medical records  [id number]        Your Vitals Were     Pulse Temperature Respirations Height Pulse Oximetry BMI (Body Mass Index)    63 97.2  F (36.2  C) (Oral) 16 5' 1.5\" (1.562 m) 97% 26.4 kg/m2       Blood Pressure from Last 3 Encounters:   18 131/77   18 124/60   17 137/82    Weight from Last 3 Encounters:   18 142 lb (64.4 kg)   18 137 lb (62.1 kg)   17 140 lb 4 oz (63.6 kg)              We Performed the Following     Comprehensive metabolic panel (BMP + Alb, Alk Phos, ALT, AST, Total. Bili, TP)     DEPRESSION ACTION PLAN (DAP)     Lipid panel reflex to direct LDL Fasting     PNEUMOCOCCAL VACCINE,ADULT,SQ OR IM     TSH with free T4 reflex          Today's Medication Changes          These changes are accurate as of 18 11:21 AM.  If you have any questions, ask your nurse or doctor.               Start taking these medicines.        Dose/Directions    oxyCODONE IR 5 MG tablet   Commonly known as:  ROXICODONE   Used for:  Chronic pain syndrome   Started by:  Lisa Merino NP        Start taking on:  2018   Take 1-2 tablets by mouth. Every 4-6 hours as needed for pain  *Must last 30 days   Quantity:  150 tablet   Refills:  0            Where to get your medicines      These medications were sent to [id number] - [place], [place] - [address]  [address], [place] [address]    Hours:  test fax successfully sent 10/22/03 kr Phone:  [phone]     pantoprazole 40 MG EC tablet         Some " of these will need a paper prescription and others can be bought over the counter.  Ask your nurse if you have questions.     Bring a paper prescription for each of these medications     oxyCODONE IR 5 MG tablet               Information about OPIOIDS     PRESCRIPTION OPIOIDS: WHAT YOU NEED TO KNOW   You have a prescription for an opioid (narcotic) pain medicine. Opioids can cause addiction. If you have a history of chemical dependency of any type, you are at a higher risk of becoming addicted to opioids. Only take this medicine after all other options have been tried. Take it for as short a time and as few doses as possible.     Do not:    Drive. If you drive while taking these medicines, you could be arrested for driving under the influence (DUI).    Operate heavy machinery    Do any other dangerous activities while taking these medicines.     Drink any alcohol while taking these medicines.      Take with any other medicines that contain acetaminophen. Read all labels carefully. Look for the word  acetaminophen  or  Tylenol.  Ask your pharmacist if you have questions or are unsure.    Store your pills in a secure place, locked if possible. We will not replace any lost or stolen medicine. If you don t finish your medicine, please throw away (dispose) as directed by your pharmacist. The [place] Pollution Control Agency has more information about safe disposal: https://www.pca.[facility].[place].us/living-green/managing-unwanted-medications    All opioids tend to cause constipation. Drink plenty of water and eat foods that have a lot of fiber, such as fruits, vegetables, prune juice, apple juice and high-fiber cereal. Take a laxative (Miralax, milk of magnesia, Colace, Senna) if you don t move your bowels at least every other day.          Primary Care Provider Office Phone # Fax #    Lisa Merino [phone] [address] [facility] [address]        Equal Access to Services     NANO BOYER  AH: Hadii sean parishmilagrosadam Merced, waaxda luqadaha, qaybta kamilton siu, aakash salas kavehjaya wattsdeomich patricio. So [facility] [phone].    ATENCIÓN: Si habla vero, tiene a sherwood disposición servicios gratuitos de asistencia lingüística. Llame al [phone].    We comply with applicable federal civil rights laws and [place] laws. We do not discriminate on the basis of race, color, national origin, age, disability, sex, sexual orientation, or gender identity.            Thank you!     Thank you for choosing [facility]  for your care. Our goal is always to provide you with excellent care. Hearing back from our patients is one way we can continue to improve our services. Please take a few minutes to complete the written survey that you may receive in the mail after your visit with us. Thank you!             Your Updated Medication List - Protect others around you: Learn how to safely use, store and throw away your medicines at www.disposemymeds.org.          This list is accurate as of 4/25/18 11:21 AM.  Always use your most recent med list.                   Brand Name Dispense Instructions for use Diagnosis    Aspercreme 10 % cream   Generic drug:  trolamine salicylate      daily use x4 times        aspirin 325 MG EC tablet      2 TABLET daily        CALCIUM + D PO      BID        cetirizine HCl 10 MG Caps    ZYRTEC ALLERGY    90 capsule    Take 10 mg by mouth daily Take  by mouth.    Allergic rhinitis due to other allergen       fluticasone 50 MCG/ACT spray    FLONASE     Spray 2 sprays into both nostrils as needed        hydrOXYzine 25 MG tablet    ATARAX    30 tablet    Take 2 tablets (50 mg) by mouth nightly as needed for anxiety    Anxiety       methocarbamol 750 MG tablet    ROBAXIN    180 tablet    Take 1 tablet (750 mg) by mouth every 6 hours as needed for muscle spasms    Chronic pain syndrome       MULTIVITAMIN TABS   OR      Take one daily        nicotine polacrilex 2 MG gum     NICORETTE STARTER KIT    30 tablet    Place 1 each (2 mg) inside cheek as needed for smoking cessation    Tobacco abuse       oxyCODONE IR 5 MG tablet   Start taking on:  6/30/2018    ROXICODONE    150 tablet    Take 1-2 tablets by mouth. Every 4-6 hours as needed for pain  *Must last 30 days    Chronic pain syndrome       pantoprazole 40 MG EC tablet    PROTONIX    90 tablet    Take 1 tablet (40 mg) by mouth daily    Gastroesophageal reflux disease without esophagitis       PARoxetine 20 MG tablet    PAXIL    45 tablet    Take 0.5 tablets (10 mg) by mouth daily    Major depressive disorder, recurrent, in full remission (H)       PROAIR  (90 Base) MCG/ACT Inhaler   Generic drug:  albuterol     8.5 g    INHALE 2 PUFFS INTO THE LUNGS EVERY 6 HOURS AS NEEDED FOR SHORTNESS OF BREATH / DYSPNEA OR WHEEZING    Tobacco abuse       PROBIOTIC DAILY PO           simvastatin 20 MG tablet    ZOCOR    90 tablet    Take  by mouth. 1 TABLET AT BEDTIME    Hyperlipidemia LDL goal <130

## 2018-04-25 NOTE — LETTER
April 26, 2018      Shaniabobby Fischeruer  [address] [place] [address] [facility] [place] [address]        Dear ,    We are writing to inform you of your test results.    Cholesterol level looks good.  Your metabolic panel (electrolytes, blood sugar, kidney and liver function) is normal.   Thyroid level is normal.    Resulted Orders   Lipid panel reflex to direct LDL Fasting   Result Value Ref Range    Cholesterol 178 <200 mg/dL    Triglycerides 58 <150 mg/dL      Comment:      Fasting specimen    HDL Cholesterol 73 >49 mg/dL    LDL Cholesterol Calculated 93 <100 mg/dL      Comment:      Desirable:       <100 mg/dl    Non HDL Cholesterol 105 <130 mg/dL   TSH with free T4 reflex   Result Value Ref Range    TSH 1.67 0.40 - 4.00 mU/L   Comprehensive metabolic panel (BMP + Alb, Alk Phos, ALT, AST, Total. Bili, TP)   Result Value Ref Range    Sodium 137 133 - 144 mmol/L    Potassium 4.5 3.4 - 5.3 mmol/L    Chloride 103 94 - 109 mmol/L    Carbon Dioxide 26 20 - 32 mmol/L    Anion Gap 8 3 - 14 mmol/L    Glucose 98 70 - 99 mg/dL      Comment:      Fasting specimen    Urea Nitrogen 7 7 - 30 mg/dL    Creatinine 0.80 0.52 - 1.04 mg/dL    GFR Estimate 72 >60 mL/min/1.7m2      Comment:      Non  GFR Calc    GFR Estimate If Black 87 >60 mL/min/1.7m2      Comment:       GFR Calc    Calcium 9.3 8.5 - 10.1 mg/dL    Bilirubin Total 0.4 0.2 - 1.3 mg/dL    Albumin 4.0 3.4 - 5.0 g/dL    Protein Total 7.3 6.8 - 8.8 g/dL    Alkaline Phosphatase 67 40 - 150 U/L    ALT 21 0 - 50 U/L    AST 17 0 - 45 U/L       If you have any questions or concerns, please call the clinic at the number listed above.       Sincerely,        Lisa Merino, NP/nr

## 2018-04-25 NOTE — LETTER
My Depression Action Plan  Name: Shania Holliday   Date of Birth 1950  Date: 4/25/2018    My doctor: Lisa Merino   My clinic: [address] [id number] [phone]          GREEN    ZONE   Good Control    What it looks like:     Things are going generally well. You have normal up s and down s. You may even feel depressed from time to time, but bad moods usually last less than a day.   What you need to do:  1. Continue to care for yourself (see self care plan)  2. Check your depression survival kit and update it as needed  3. Follow your physician s recommendations including any medication.  4. Do not stop taking medication unless you consult with your physician first.           YELLOW         ZONE Getting Worse    What it looks like:     Depression is starting to interfere with your life.     It may be hard to get out of bed; you may be starting to isolate yourself from others.    Symptoms of depression are starting to last most all day and this has happened for several days.     You may have suicidal thoughts but they are not constant.   What you need to do:     1. Call your care team, your response to treatment will improve if you keep your care team informed of your progress. Yellow periods are signs an adjustment may need to be made.     2. Continue your self-care, even if you have to fake it!    3. Talk to someone in your support network    4. Open up your depression survival kit           RED    ZONE Medical Alert - Get Help    What it looks like:     Depression is seriously interfering with your life.     You may experience these or other symptoms: You can t get out of bed most days, can t work or engage in other necessary activities, you have trouble taking care of basic hygiene, or basic responsibilities, thoughts of suicide or death that will not go away, self-injurious behavior.     What you need to do:  1. Call your care team and  request a same-day appointment. If they are not available (weekends or after hours) call your local crisis line, emergency room or 911.            Depression Self Care Plan / Survival Kit    Self-Care for Depression  Here s the deal. Your body and mind are really not as separate as most people think.  What you do and think affects how you feel and how you feel influences what you do and think. This means if you do things that people who feel good do, it will help you feel better.  Sometimes this is all it takes.  There is also a place for medication and therapy depending on how severe your depression is, so be sure to consult with your medical provider and/ or Behavioral Health Consultant if your symptoms are worsening or not improving.     In order to better manage my stress, I will:    Exercise  Get some form of exercise, every day. This will help reduce pain and release endorphins, the  feel good  chemicals in your brain. This is almost as good as taking antidepressants!  This is not the same as joining a gym and then never going! (they count on that by the way ) It can be as simple as just going for a walk or doing some gardening, anything that will get you moving.      Hygiene   Maintain good hygiene (Get out of bed in the morning, Make your bed, Brush your teeth, Take a shower, and Get dressed like you were going to work, even if you are unemployed).  If your clothes don't fit try to get ones that do.    Diet  I will strive to eat foods that are good for me, drink plenty of water, and avoid excessive sugar, caffeine, alcohol, and other mood-altering substances.  Some foods that are helpful in depression are: complex carbohydrates, B vitamins, flaxseed, fish or fish oil, fresh fruits and vegetables.    Psychotherapy  I agree to participate in Individual Therapy (if recommended).    Medication  If prescribed medications, I agree to take them.  Missing doses can result in serious side effects.  I understand that  drinking alcohol, or other illicit drug use, may cause potential side effects.  I will not stop my medication abruptly without first discussing it with my provider.    Staying Connected With Others  I will stay in touch with my friends, family members, and my primary care provider/team.    Use your imagination  Be creative.  We all have a creative side; it doesn t matter if it s oil painting, sand castles, or mud pies! This will also kick up the endorphins.    Witness Beauty  (AKA stop and smell the roses) Take a look outside, even in mid-winter. Notice colors, textures. Watch the squirrels and birds.     Service to others  Be of service to others.  There is always someone else in need.  By helping others we can  get out of ourselves  and remember the really important things.  This also provides opportunities for practicing all the other parts of the program.    Humor  Laugh and be silly!  Adjust your TV habits for less news and crime-drama and more comedy.    Control your stress  Try breathing deep, massage therapy, biofeedback, and meditation. Find time to relax each day.     My support system    Clinic Contact:  Phone number:    Contact 1:  Phone number:    Contact 2:  Phone number:    Taoism/:  Phone number:    Therapist:  Phone number:    Local crisis center:    Phone number:    Other community support:  Phone number:

## 2018-04-25 NOTE — NURSING NOTE
Screening Questionnaire for Adult Immunization    Are you sick today?   No   Do you have allergies to medications, food, a vaccine component or latex?   No   Have you ever had a serious reaction after receiving a vaccination?   No   Do you have a long-term health problem with heart disease, lung disease, asthma, kidney disease, metabolic disease (e.g. diabetes), anemia, or other blood disorder?   No   Do you have cancer, leukemia, HIV/AIDS, or any other immune system problem?   No   In the past 3 months, have you taken medications that affect  your immune system, such as prednisone, other steroids, or anticancer drugs; drugs for the treatment of rheumatoid arthritis, Crohn s disease, or psoriasis; or have you had radiation treatments?   No   Have you had a seizure, or a brain or other nervous system problem?   No   During the past year, have you received a transfusion of blood or blood     products, or been given immune (gamma) globulin or antiviral drug?   No   For women: Are you pregnant or is there a chance you could become        pregnant during the next month?   No   Have you received any vaccinations in the past 4 weeks?   No     Immunization questionnaire answers were all negative.        Per orders of Dr. Merino, injection of pneumococcal 23 given by Myriam Lacey. Patient instructed to remain in clinic for 15 minutes afterwards, and to report any adverse reaction to me immediately.  Due to injection administration, patient instructed to remain in clinic for 15 minutes  afterwards, and to report any adverse reaction to me immediately.  Prior to injection verified patient identity using patient's name and date of birth.         Screening performed by Myriam Lacey on 4/25/2018 at 11:32 AM.

## 2018-04-25 NOTE — PROGRESS NOTES
SUBJECTIVE:   Shania Holilday is a 67 year old female who presents for Preventive Visit.  click delete button to remove this line now  click delete button to remove this line now  Are you in the first 12 months of your Medicare coverage?  No    Physical   Annual:     Getting at least 3 servings of Calcium per day::  Yes    Bi-annual eye exam::  NO    Dental care twice a year::  Yes    Sleep apnea or symptoms of sleep apnea::  Excessive snoring    Diet::  Diabetic    Frequency of exercise::  4-5 days/week    Duration of exercise::  15-30 minutes    Medication side effects::  No muscle aches, No significant flushing and Other    Additional concerns today::  No    Ability to successfully perform activities of daily living: no assistance needed  Home Safety:  No safety concerns identified  Hearing Impairment: no hearing concerns        Fall risk:       COGNITIVE SCREEN  1) Repeat 3 items (Banana, Sunrise, Chair)      2) Clock draw:   NORMAL  3) 3 item recall: Recalls 2 objects   Results: NORMAL clock, 1-2 items recalled: COGNITIVE IMPAIRMENT LESS LIKELY    Mini-CogTM Copyright DIM[employer]. Licensed by the author for use in [facility]; reprinted with permission (soob@[facility]). All rights reserved.        Reviewed and updated as needed this visit by clinical staff  Tobacco  Allergies  Meds  Med Hx  Surg Hx  Fam Hx  Soc Hx        Reviewed and updated as needed this visit by Provider        Social History   Substance Use Topics     Smoking status: Current Every Day Smoker     Packs/day: 0.50     Years: 38.00     Types: Cigarettes     Smokeless tobacco: Never Used      Comment: 8 per day.      Alcohol use No       Alcohol Use 4/25/2018   If you drink alcohol do you typically have greater than 3 drinks per day OR greater than 7 drinks per week? Not Applicable               Today's PHQ-2 Score:   PHQ-2 ( 1999 Pfizer) 4/25/2018   Q1: Little interest or pleasure in doing things 0   Q2: Feeling down, depressed or  hopeless 0   PHQ-2 Score 0   Q1: Little interest or pleasure in doing things Not at all   Q2: Feeling down, depressed or hopeless Not at all   PHQ-2 Score 0       Do you feel safe in your environment - Yes    Do you have a Health Care Directive?: Yes: Patient states has Advance Directive and will bring in a copy to clinic.    Current providers sharing in care for this patient include:   Patient Care Team:  Lisa Merino NP as PCP - General    The following health maintenance items are reviewed in Epic and correct as of today:  Health Maintenance   Topic Date Due     COPD ACTION PLAN Q1 YR  1950     URINE DRUG SCREEN Q1 YR  07/23/1966     TOBACCO CESSATION COUNSELING Q1 YR  04/25/2017     PNEUMOCOCCAL (2 of 2 - PPSV23) 02/06/2018     DEPRESSION ACTION PLAN Q1 YR  02/06/2018     LUNG CANCER SCREENING ANNUAL  06/08/2018     PHQ-9 Q6 MONTHS  07/31/2018     FALL RISK ASSESSMENT  01/31/2019     NASRA QUESTIONNAIRE 1 YEAR  01/31/2019     MAMMO SCREEN Q2 YR (SYSTEM ASSIGNED)  02/02/2019     ADVANCE DIRECTIVE PLANNING Q5 YRS  04/17/2019     LIPID SCREEN Q5 YR FEMALE (SYSTEM ASSIGNED)  02/06/2022     TETANUS IMMUNIZATION (SYSTEM ASSIGNED)  07/30/2023     COLON CANCER SCREEN (SYSTEM ASSIGNED)  12/15/2024     SPIROMETRY ONETIME  Completed     DEXA SCAN SCREENING (SYSTEM ASSIGNED)  Completed     INFLUENZA VACCINE  Completed     HEPATITIS C SCREENING  Completed             Review of Systems  CONSTITUTIONAL: NEGATIVE for fever, chills, change in weight  ENT/MOUTH: NEGATIVE for ear, mouth and throat problems  RESP: NEGATIVE for significant cough or SOB  CV: NEGATIVE for chest pain, palpitations or peripheral edema  GI: NEGATIVE for nausea, abdominal pain, heartburn, or change in bowel habits  : negative for vaginal bleeding  MUSCULOSKELETAL: chronic neck and back pain  NEURO: NEGATIVE for weakness, dizziness or paresthesias  PSYCHIATRIC: NEGATIVE for changes in mood or affect    OBJECTIVE:   /77  Pulse 63  Temp  "97.2  F (36.2  C) (Oral)  Resp 16  Ht 5' 1.5\" (1.562 m)  Wt 142 lb (64.4 kg)  SpO2 97%  BMI 26.4 kg/m2 Estimated body mass index is 26.4 kg/(m^2) as calculated from the following:    Height as of this encounter: 5' 1.5\" (1.562 m).    Weight as of this encounter: 142 lb (64.4 kg).  Physical Exam  GENERAL: healthy, alert and no distress  EYES: Eyes grossly normal to inspection, PERRL and conjunctivae and sclerae normal  HENT: ear canals and TM's normal, nose and mouth without ulcers or lesions  NECK: no adenopathy, no asymmetry, masses, or scars and thyroid normal to palpation  RESP: lungs clear to auscultation - no rales, rhonchi or wheezes  BREAST: normal without masses, tenderness or nipple discharge and no palpable axillary masses or adenopathy  CV: regular rate and rhythm, normal S1 S2, no S3 or S4, no murmur, click or rub, no peripheral edema and peripheral pulses strong  ABDOMEN: soft, nontender, no hepatosplenomegaly, no masses and bowel sounds normal  MS: decreased ROM of cervical and lumbar spine  SKIN: no suspicious lesions or rashes  NEURO: Normal strength and tone, mentation intact and speech normal  PSYCH: mentation appears normal, affect normal/bright    ASSESSMENT / PLAN:   1. Encounter for routine adult health examination without abnormal findings      2. Chronic pain syndrome  Stable  Refills given for 3 months.  Follow up in 3 months.   - oxyCODONE IR (ROXICODONE) 5 MG tablet; Take 1-2 tablets by mouth. Every 4-6 hours as needed for pain   *Must last 30 days  Dispense: 150 tablet; Refill: 0    3. Pulmonary emphysema, unspecified emphysema type (H)  stable    4. Gastroesophageal reflux disease without esophagitis  Refills given.   - pantoprazole (PROTONIX) 40 MG EC tablet; Take 1 tablet (40 mg) by mouth daily  Dispense: 90 tablet; Refill: PRN    5. Need for pneumococcal vaccination    - PNEUMOCOCCAL VACCINE,ADULT,SQ OR IM    6. Hyperlipidemia LDL goal <130    - Lipid panel reflex to direct LDL " "Fasting    7. Encounter for therapeutic drug monitoring    - TSH with free T4 reflex  - Comprehensive metabolic panel (BMP + Alb, Alk Phos, ALT, AST, Total. Bili, TP)    End of Life Planning:  Patient currently has an advanced directive: Yes.  Practitioner is supportive of decision.    COUNSELING:  Reviewed preventive health counseling, as reflected in patient instructions        Estimated body mass index is 26.4 kg/(m^2) as calculated from the following:    Height as of this encounter: 5' 1.5\" (1.562 m).    Weight as of this encounter: 142 lb (64.4 kg).     reports that she has been smoking Cigarettes.  She has a 19.00 pack-year smoking history. She has never used smokeless tobacco.  Tobacco Cessation Action Plan: Information offered: Patient not interested at this time    Appropriate preventive services were discussed with this patient, including applicable screening as appropriate for cardiovascular disease, diabetes, osteopenia/osteoporosis, and glaucoma.  As appropriate for age/gender, discussed screening for colorectal cancer, prostate cancer, breast cancer, and cervical cancer. Checklist reviewing preventive services available has been given to the patient.    Reviewed patients plan of care and provided an AVS. The Basic Care Plan (routine screening as documented in Health Maintenance) for Shania meets the Care Plan requirement. This Care Plan has been established and reviewed with the Patient.    Counseling Resources:  ATP IV Guidelines  Pooled Cohorts Equation Calculator  Breast Cancer Risk Calculator  FRAX Risk Assessment  ICSI Preventive Guidelines  Dietary Guidelines for Americans, 2010  USDA's MyPlate  ASA Prophylaxis  Lung CA Screening    Lisa Merino NP  [facility]  Answers for HPI/ROS submitted by the patient on 4/25/2018   PHQ-2 Score: 0    "

## 2018-05-14 ENCOUNTER — TELEPHONE (OUTPATIENT)
Dept: FAMILY MEDICINE | Facility: CLINIC | Age: 68
End: 2018-05-14

## 2018-05-14 NOTE — TELEPHONE ENCOUNTER
"Prior Authorization Retail Medication Request    Medication/Dose: methocarbamol (ROBAXIN) 750 MG tablet  ICD code (if different than what is on RX):  Previously Tried and Failed:  Rationale:    Insurance Name:    Insurance ID:      Pharmacy Information (if different than what is on RX)  Name:  Phone:    Please include previous medications tried and failed.  Please ask insurance for medications on formulary.    PA started for methocarbamol (ROBAXIN) 750 MG tablet.  To submit the PA:  1. Go to [email or web address] and click Enter a key  2. Enter the pt's last name and date of birth and the key   Patient last name:SARAH   :[birth year]   Key:[id number]  3. Complete the form and click \"Send to Plan\"        "

## 2018-05-15 NOTE — TELEPHONE ENCOUNTER
Central Prior Authorization Team   Phone: [phone]      PA Initiation    Medication: methocarbamol (ROBAXIN) 750 MG tablet, rec 5-14-18  Insurance Company: [employer] - Phone [phone] Fax [phone]  Pharmacy Filling the Rx: [id number] [place] [address] [address]  Filling Pharmacy Phone: [phone]  Filling Pharmacy Fax: [phone]  Start Date: 5/15/2018

## 2018-05-15 NOTE — TELEPHONE ENCOUNTER
Prior Authorization Approval    Authorization Effective Date: 4/15/2018  Authorization Expiration Date: 5/15/2019  Medication: methocarbamol (ROBAXIN) 750 MG tablet, rec 5-14-18  Approved Dose/Quantity:    Reference #: [id number]   Insurance Company: [employer] - Phone [phone] Fax [phone]  Expected CoPay:       CoPay Card Available:      Foundation Assistance Needed:    Which Pharmacy is filling the prescription (Not needed for infusion/clinic administered): [id number] - [place] [address] [address]  Pharmacy Notified: Yes  Patient Notified: Yes

## 2018-05-30 DIAGNOSIS — F41.9 ANXIETY: ICD-10-CM

## 2018-05-31 NOTE — TELEPHONE ENCOUNTER
"Requested Prescriptions   Pending Prescriptions Disp Refills     hydrOXYzine (ATARAX) 25 MG tablet [Pharmacy Med Name: HydrOXYzine HCl Oral Tablet 25 MG]  Last Written Prescription Date:  5-8-17  Last Fill Quantity: 30 tab,  # refills: PRN   Last office visit: 4/25/2018 with prescribing provider:  Marisa Merino    Future Office Visit:    30 tablet PRN     Sig: TAKE TWO TABLETS BY MOUTH DAILY AT BEDTIME AS NEEDED FOR ANXIETY    Antihistamines Protocol Passed    5/30/2018  3:21 PM       Passed - Recent (12 mo) or future (30 days) visit within the authorizing provider's specialty    Patient had office visit in the last 12 months or has a visit in the next 30 days with authorizing provider or within the authorizing provider's specialty.  See \"Patient Info\" tab in inbasket, or \"Choose Columns\" in Meds & Orders section of the refill encounter.           Passed - Patient is age 3 or older    Apply age and/or weight-based dosing for peds patients age 3 and older.    Forward request to provider for patients under the age of 3.            "

## 2018-05-31 NOTE — TELEPHONE ENCOUNTER
Routing refill request to provider for review/approval because:  Diagnosis not on the [facility] refill protocol Anxiety [F41.9]     Thank you,  Leandro Cee RN

## 2018-06-04 RX ORDER — HYDROXYZINE HYDROCHLORIDE 25 MG/1
TABLET, FILM COATED ORAL
Qty: 30 TABLET | Status: SHIPPED | OUTPATIENT
Start: 2018-06-04 | End: 2019-05-23

## 2018-06-05 NOTE — TELEPHONE ENCOUNTER
Placed approval letter for Hydroxyzine hcl 25 mg tablet in abstraction.  Approved from 5/5/18-6/4/2019    Emily Montes MA

## 2018-07-19 ENCOUNTER — OFFICE VISIT (OUTPATIENT)
Dept: FAMILY MEDICINE | Facility: CLINIC | Age: 68
End: 2018-07-19
Payer: COMMERCIAL

## 2018-07-19 VITALS
OXYGEN SATURATION: 98 % | SYSTOLIC BLOOD PRESSURE: 124 MMHG | WEIGHT: 146.5 LBS | TEMPERATURE: 97.2 F | RESPIRATION RATE: 18 BRPM | HEART RATE: 79 BPM | BODY MASS INDEX: 27.23 KG/M2 | DIASTOLIC BLOOD PRESSURE: 78 MMHG

## 2018-07-19 DIAGNOSIS — G89.4 CHRONIC PAIN SYNDROME: Primary | ICD-10-CM

## 2018-07-19 DIAGNOSIS — F11.20 CONTINUOUS OPIOID DEPENDENCE (H): ICD-10-CM

## 2018-07-19 PROCEDURE — 99214 OFFICE O/P EST MOD 30 MIN: CPT | Performed by: NURSE PRACTITIONER

## 2018-07-19 RX ORDER — OXYCODONE HYDROCHLORIDE 5 MG/1
TABLET ORAL
Qty: 150 TABLET | Refills: 0 | Status: SHIPPED | OUTPATIENT
Start: 2018-09-28 | End: 2018-10-23

## 2018-07-19 RX ORDER — OXYCODONE HYDROCHLORIDE 5 MG/1
TABLET ORAL
Qty: 150 TABLET | Refills: 0 | Status: SHIPPED | OUTPATIENT
Start: 2018-07-30 | End: 2018-07-19

## 2018-07-19 RX ORDER — OXYCODONE HYDROCHLORIDE 5 MG/1
TABLET ORAL
Qty: 150 TABLET | Refills: 0 | Status: SHIPPED | OUTPATIENT
Start: 2018-08-29 | End: 2018-07-19

## 2018-07-19 NOTE — MR AVS SNAPSHOT
"              After Visit Summary   2018    Shania Holliday    MRN: [id number]           Patient Information     Date Of Birth          1950        Visit Information        Provider Department      2018 10:30 AM Lisa Merino NP [facility]        Today's Diagnoses     Chronic pain syndrome    -  1    Continuous opioid dependence (H)           Follow-ups after your visit        Who to contact     If you have questions or need follow up information about today's clinic visit or your schedule please contact [facility] directly at [phone].  Normal or non-critical lab and imaging results will be communicated to you by 51fanlihart, letter or phone within 4 business days after the clinic has received the results. If you do not hear from us within 7 days, please contact the clinic through 51fanlihart or phone. If you have a critical or abnormal lab result, we will notify you by phone as soon as possible.  Submit refill requests through Field Agent or call your pharmacy and they will forward the refill request to us. Please allow 3 business days for your refill to be completed.          Additional Information About Your Visit        MyChart Information     Field Agent lets you send messages to your doctor, view your test results, renew your prescriptions, schedule appointments and more. To sign up, go to [email or web address] . Click on \"Log in\" on the left side of the screen, which will take you to the Welcome page. Then click on \"Sign up Now\" on the right side of the page.     You will be asked to enter the access code listed below, as well as some personal information. Please follow the directions to create your username and password.     Your access code is: [email or web address]  Expires: 2018 11:21 AM     Your access code will  in 90 days. If you need help or a new code, please call your [facility] or [phone].        Care EveryWhere ID     This is your Care " EveryWhere ID. This could be used by other organizations to access your [place] medical records  [id number]        Your Vitals Were     Pulse Temperature Respirations Pulse Oximetry BMI (Body Mass Index)       79 97.2  F (36.2  C) (Oral) 18 98% 27.23 kg/m2        Blood Pressure from Last 3 Encounters:   07/19/18 124/78   04/25/18 131/77   01/31/18 124/60    Weight from Last 3 Encounters:   07/19/18 146 lb 8 oz (66.5 kg)   04/25/18 142 lb (64.4 kg)   01/31/18 137 lb (62.1 kg)              Today, you had the following     No orders found for display         Today's Medication Changes          These changes are accurate as of 7/19/18 11:12 AM.  If you have any questions, ask your nurse or doctor.               Start taking these medicines.        Dose/Directions    oxyCODONE IR 5 MG tablet   Commonly known as:  ROXICODONE   Used for:  Chronic pain syndrome   Started by:  Lisa Merino NP        Start taking on:  9/28/2018   Take 1-2 tablets by mouth. Every 4-6 hours as needed for pain  *Must last 30 days   Quantity:  150 tablet   Refills:  0            Where to get your medicines      Some of these will need a paper prescription and others can be bought over the counter.  Ask your nurse if you have questions.     Bring a paper prescription for each of these medications     oxyCODONE IR 5 MG tablet               Information about OPIOIDS     PRESCRIPTION OPIOIDS: WHAT YOU NEED TO KNOW   We gave you an opioid (narcotic) pain medicine. It is important to manage your pain, but opioids are not always the best choice. You should first try all the other options your care team gave you. Take this medicine for as short a time (and as few doses) as possible.     These medicines have risks:    DO NOT drive when on new or higher doses of pain medicine. These medicines can affect your alertness and reaction times, and you could be arrested for driving under the influence (DUI). If you need to use opioids long-term, talk to  your care team about driving.    DO NOT operate heave machinery    DO NOT do any other dangerous activities while taking these medicines.     DO NOT drink any alcohol while taking these medicines.      If the opioid prescribed includes acetaminophen, DO NOT take with any other medicines that contain acetaminophen. Read all labels carefully. Look for the word  acetaminophen  or  Tylenol.  Ask your pharmacist if you have questions or are unsure.    You can get addicted to pain medicines, especially if you have a history of addiction (chemical, alcohol or substance dependence). Talk to your care team about ways to reduce this risk.    Store your pills in a secure place, locked if possible. We will not replace any lost or stolen medicine. If you don t finish your medicine, please throw away (dispose) as directed by your pharmacist. The [place] Pollution Control Agency has more information about safe disposal: https://www.pca.[facility].[place]/living-green/managing-unwanted-medications.     All opioids tend to cause constipation. Drink plenty of water and eat foods that have a lot of fiber, such as fruits, vegetables, prune juice, apple juice and high-fiber cereal. Take a laxative (Miralax, milk of magnesia, Colace, Senna) if you don t move your bowels at least every other day.          Primary Care Provider Office Phone # Fax #    Lisa Merino [phone] [address] [facility] [address]        Equal Access to Services     NANO BOYER AH: Hadii sean sam hadasho Soomaali, waaxda luqadaha, qaybta kaalmada aakash siu. So [facility] [phone].    ATENCIÓN: Si habla español, tiene a sherwood disposición servicios gratuitos de asistencia lingüística. Jarad al [phone].    We comply with applicable federal civil rights laws and [place] laws. We do not discriminate on the basis of race, color, national origin, age, disability, sex, sexual orientation, or gender  identity.            Thank you!     Thank you for choosing [facility]  for your care. Our goal is always to provide you with excellent care. Hearing back from our patients is one way we can continue to improve our services. Please take a few minutes to complete the written survey that you may receive in the mail after your visit with us. Thank you!             Your Updated Medication List - Protect others around you: Learn how to safely use, store and throw away your medicines at www.disposemymeds.org.          This list is accurate as of 7/19/18 11:13 AM.  Always use your most recent med list.                   Brand Name Dispense Instructions for use Diagnosis    Aspercreme 10 % cream   Generic drug:  trolamine salicylate      daily use x4 times        aspirin 325 MG EC tablet      2 TABLET daily        CALCIUM + D PO      BID        cetirizine HCl 10 MG Caps    ZYRTEC ALLERGY    90 capsule    Take 10 mg by mouth daily Take  by mouth.    Allergic rhinitis due to other allergen       fluticasone 50 MCG/ACT spray    FLONASE     Spray 2 sprays into both nostrils as needed        hydrOXYzine 25 MG tablet    ATARAX    30 tablet    TAKE TWO TABLETS BY MOUTH DAILY AT BEDTIME AS NEEDED FOR ANXIETY    Anxiety       methocarbamol 750 MG tablet    ROBAXIN    180 tablet    Take 1 tablet (750 mg) by mouth every 6 hours as needed for muscle spasms    Chronic pain syndrome       MULTIVITAMIN TABS   OR      Take one daily        nicotine polacrilex 2 MG gum    NICORETTE STARTER KIT    30 tablet    Place 1 each (2 mg) inside cheek as needed for smoking cessation    Tobacco abuse       oxyCODONE IR 5 MG tablet   Start taking on:  9/28/2018    ROXICODONE    150 tablet    Take 1-2 tablets by mouth. Every 4-6 hours as needed for pain  *Must last 30 days    Chronic pain syndrome       pantoprazole 40 MG EC tablet    PROTONIX    90 tablet    Take 1 tablet (40 mg) by mouth daily    Gastroesophageal reflux disease  without esophagitis       PARoxetine 20 MG tablet    PAXIL    45 tablet    Take 0.5 tablets (10 mg) by mouth daily    Major depressive disorder, recurrent, in full remission (H)       PROAIR  (90 Base) MCG/ACT Inhaler   Generic drug:  albuterol     8.5 g    INHALE 2 PUFFS INTO THE LUNGS EVERY 6 HOURS AS NEEDED FOR SHORTNESS OF BREATH / DYSPNEA OR WHEEZING    Tobacco abuse       simvastatin 20 MG tablet    ZOCOR    90 tablet    Take  by mouth. 1 TABLET AT BEDTIME    Hyperlipidemia LDL goal <130

## 2018-07-20 ASSESSMENT — PATIENT HEALTH QUESTIONNAIRE - PHQ9: SUM OF ALL RESPONSES TO PHQ QUESTIONS 1-9: 0

## 2018-09-13 ENCOUNTER — TRANSFERRED RECORDS (OUTPATIENT)
Dept: HEALTH INFORMATION MANAGEMENT | Facility: CLINIC | Age: 68
End: 2018-09-13

## 2018-10-23 ENCOUNTER — OFFICE VISIT (OUTPATIENT)
Dept: FAMILY MEDICINE | Facility: CLINIC | Age: 68
End: 2018-10-23
Payer: COMMERCIAL

## 2018-10-23 VITALS
DIASTOLIC BLOOD PRESSURE: 88 MMHG | TEMPERATURE: 98.3 F | OXYGEN SATURATION: 97 % | BODY MASS INDEX: 27.35 KG/M2 | RESPIRATION RATE: 18 BRPM | SYSTOLIC BLOOD PRESSURE: 138 MMHG | HEART RATE: 72 BPM | WEIGHT: 147.13 LBS

## 2018-10-23 DIAGNOSIS — G89.4 CHRONIC PAIN SYNDROME: Primary | ICD-10-CM

## 2018-10-23 DIAGNOSIS — Z23 NEED FOR PROPHYLACTIC VACCINATION AND INOCULATION AGAINST INFLUENZA: ICD-10-CM

## 2018-10-23 PROCEDURE — G0008 ADMIN INFLUENZA VIRUS VAC: HCPCS | Performed by: NURSE PRACTITIONER

## 2018-10-23 PROCEDURE — 99213 OFFICE O/P EST LOW 20 MIN: CPT | Mod: 25 | Performed by: NURSE PRACTITIONER

## 2018-10-23 PROCEDURE — 90662 IIV NO PRSV INCREASED AG IM: CPT | Performed by: NURSE PRACTITIONER

## 2018-10-23 RX ORDER — OXYCODONE HYDROCHLORIDE 5 MG/1
TABLET ORAL
Qty: 150 TABLET | Refills: 0 | Status: SHIPPED | OUTPATIENT
Start: 2018-10-28 | End: 2018-10-23

## 2018-10-23 RX ORDER — OXYCODONE HYDROCHLORIDE 5 MG/1
TABLET ORAL
Qty: 150 TABLET | Refills: 0 | Status: SHIPPED | OUTPATIENT
Start: 2018-11-27 | End: 2018-10-23

## 2018-10-23 RX ORDER — OXYCODONE HYDROCHLORIDE 5 MG/1
TABLET ORAL
Qty: 150 TABLET | Refills: 0 | Status: SHIPPED | OUTPATIENT
Start: 2018-12-27 | End: 2019-01-24

## 2018-10-23 RX ORDER — OXYCODONE HYDROCHLORIDE 5 MG/1
TABLET ORAL
Qty: 150 TABLET | Refills: 0 | Status: CANCELLED | OUTPATIENT
Start: 2018-10-23

## 2018-10-23 NOTE — PROGRESS NOTES
SUBJECTIVE:   Shania Holliday is a 68 year old female who presents to clinic today for the following health issues:      Chronic Pain Follow-Up       Type / Location of Pain: jaw, neck, spinal pain, left knee, feet pain   Analgesia/pain control:       Recent changes:  same      Overall control: Tolerable with discomfort  Activity level/function:      Daily activities:  Able to do light housework, cooking with pain medication     Work:  not applicable  Adverse effects:  No  Adherance    Taking medication as directed?  Yes    Participating in other treatments: not at present   Risk Factors:    Sleep:  Good    Mood/anxiety:  controlled    Recent family or social stressors:  none noted    Other aggravating factors: prolonged sitting, prolonged standing and clenching of jaw  PHQ-9 SCORE 11/2/2017 1/31/2018 7/19/2018   Total Score - - -   Total Score 1 0 0     NASRA-7 SCORE 10/29/2015 11/14/2016 1/31/2018   Total Score - - -   Total Score 0 0 0     Encounter-Level CSA - 08/04/2015:          Controlled Substance Agreement - Scan on 8/6/2015  2:50 PM : CONTROLLED SUBSTANCE AGREEMENT (below)                Amount of exercise or physical activity: none. Up and down the stairs. Stretching everyday     Problems taking medications regularly: No    Medication side effects: none    Diet: regular (no restrictions)      Pain is stable with oxycodone, 5 tablets daily.      Problem list and histories reviewed & adjusted, as indicated.  Additional history: as documented        Reviewed and updated as needed this visit by clinical staff       Reviewed and updated as needed this visit by Provider         ROS:  CONSTITUTIONAL: NEGATIVE for fever, chills, change in weight  ENT/MOUTH: NEGATIVE for ear, mouth and throat problems  RESP: NEGATIVE for significant cough or SOB  CV: NEGATIVE for chest pain, palpitations or peripheral edema  GI: NEGATIVE for nausea, abdominal pain, heartburn, or change in bowel habits  MUSCULOSKELETAL: see  HPI  NEURO: NEGATIVE for weakness, dizziness or paresthesias  PSYCHIATRIC: NEGATIVE for changes in mood or affect    OBJECTIVE:     /88  Pulse 72  Temp 98.3  F (36.8  C) (Oral)  Resp 18  Wt 147 lb 2 oz (66.7 kg)  SpO2 97%  BMI 27.35 kg/m2  Body mass index is 27.35 kg/(m^2).  GENERAL: healthy, alert and no distress  MS: reduced ROM of cervical and lumbar spine  PSYCH: mentation appears normal, affect normal/bright        ASSESSMENT/PLAN:             1. Chronic pain syndrome  Monitoring high risk medication.   appropriate.  Refills given for 3 months.  Follow up in 3 months.   - oxyCODONE IR (ROXICODONE) 5 MG tablet; Take 1-2 tablets by mouth. Every 4-6 hours as needed for pain   *Must last 30 days  Dispense: 150 tablet; Refill: 0    2. Need for prophylactic vaccination and inoculation against influenza    - FLU VACCINE, INCREASED ANTIGEN, PRESV FREE, AGE 65+ [49967]  - Vaccine Administration, Initial [64092]        Lisa Merino NP  [facility]      Injectable Influenza Immunization Documentation    1.  Is the person to be vaccinated sick today?   No    2. Does the person to be vaccinated have an allergy to a component   of the vaccine?   No  Egg Allergy Algorithm Link    3. Has the person to be vaccinated ever had a serious reaction   to influenza vaccine in the past?   No    4. Has the person to be vaccinated ever had Guillain-Barré syndrome?   No    Form completed by Emily Montes MA

## 2018-10-23 NOTE — MR AVS SNAPSHOT
"              After Visit Summary   10/23/2018    Shania Holliday    MRN: [id number]           Patient Information     Date Of Birth          1950        Visit Information        Provider Department      10/23/2018 1:15 PM Lisa Merino NP [facility]        Today's Diagnoses     Chronic pain syndrome    -  1    Need for prophylactic vaccination and inoculation against influenza           Follow-ups after your visit        Follow-up notes from your care team     Return in about 3 months (around 2019).      Who to contact     If you have questions or need follow up information about today's clinic visit or your schedule please contact [facility] directly at [phone].  Normal or non-critical lab and imaging results will be communicated to you by [employer]ov[employer] Medicalhart, letter or phone within 4 business days after the clinic has received the results. If you do not hear from us within 7 days, please contact the clinic through [employer] or phone. If you have a critical or abnormal lab result, we will notify you by phone as soon as possible.  Submit refill requests through Lat49 or call your pharmacy and they will forward the refill request to us. Please allow 3 business days for your refill to be completed.          Additional Information About Your Visit        MyChart Information     Lat49 lets you send messages to your doctor, view your test results, renew your prescriptions, schedule appointments and more. To sign up, go to [email or web address] . Click on \"Log in\" on the left side of the screen, which will take you to the Welcome page. Then click on \"Sign up Now\" on the right side of the page.     You will be asked to enter the access code listed below, as well as some personal information. Please follow the directions to create your username and password.     Your access code is: [email or web address]  Expires: 2019  2:03 PM     Your access code will  in 90 days. If " you need help or a new code, please call your [place] clinic or [phone].        Care EveryWhere ID     This is your Care EveryWhere ID. This could be used by other organizations to access your [place] medical records  [id number]        Your Vitals Were     Pulse Temperature Respirations Pulse Oximetry BMI (Body Mass Index)       72 98.3  F (36.8  C) (Oral) 18 97% 27.35 kg/m2        Blood Pressure from Last 3 Encounters:   10/23/18 138/88   07/19/18 124/78   04/25/18 131/77    Weight from Last 3 Encounters:   10/23/18 147 lb 2 oz (66.7 kg)   07/19/18 146 lb 8 oz (66.5 kg)   04/25/18 142 lb (64.4 kg)              We Performed the Following     FLU VACCINE, INCREASED ANTIGEN, PRESV FREE, AGE 65+ [52660]     Vaccine Administration, Initial [72082]          Today's Medication Changes          These changes are accurate as of 10/23/18  2:03 PM.  If you have any questions, ask your nurse or doctor.               Start taking these medicines.        Dose/Directions    oxyCODONE IR 5 MG tablet   Commonly known as:  ROXICODONE   Used for:  Chronic pain syndrome   Started by:  Lisa Merino NP        Start taking on:  12/27/2018   Take 1-2 tablets by mouth. Every 4-6 hours as needed for pain  *Must last 30 days   Quantity:  150 tablet   Refills:  0            Where to get your medicines      Some of these will need a paper prescription and others can be bought over the counter.  Ask your nurse if you have questions.     Bring a paper prescription for each of these medications     oxyCODONE IR 5 MG tablet               Information about OPIOIDS     PRESCRIPTION OPIOIDS: WHAT YOU NEED TO KNOW   We gave you an opioid (narcotic) pain medicine. It is important to manage your pain, but opioids are not always the best choice. You should first try all the other options your care team gave you. Take this medicine for as short a time (and as few doses) as possible.    Some activities can increase your pain, such as  bandage changes or therapy sessions. It may help to take your pain medicine 30 to 60 minutes before these activities. Reduce your stress by getting enough sleep, working on hobbies you enjoy and practicing relaxation or meditation. Talk to your care team about ways to manage your pain beyond prescription opioids.    These medicines have risks:    DO NOT drive when on new or higher doses of pain medicine. These medicines can affect your alertness and reaction times, and you could be arrested for driving under the influence (DUI). If you need to use opioids long-term, talk to your care team about driving.    DO NOT operate heavy machinery    DO NOT do any other dangerous activities while taking these medicines.    DO NOT drink any alcohol while taking these medicines.     If the opioid prescribed includes acetaminophen, DO NOT take with any other medicines that contain acetaminophen. Read all labels carefully. Look for the word  acetaminophen  or  Tylenol.  Ask your pharmacist if you have questions or are unsure.    You can get addicted to pain medicines, especially if you have a history of addiction (chemical, alcohol or substance dependence). Talk to your care team about ways to reduce this risk.    All opioids tend to cause constipation. Drink plenty of water and eat foods that have a lot of fiber, such as fruits, vegetables, prune juice, apple juice and high-fiber cereal. Take a laxative (Miralax, milk of magnesia, Colace, Senna) if you don t move your bowels at least every other day. Other side effects include upset stomach, sleepiness, dizziness, throwing up, tolerance (needing more of the medicine to have the same effect), physical dependence and slowed breathing.    Store your pills in a secure place, locked if possible. We will not replace any lost or stolen medicine. If you don t finish your medicine, please throw away (dispose) as directed by your pharmacist. The [place] Pollution Control Agency has more  information about safe disposal: https://www.pca.[facility].mn.us/living-green/managing-unwanted-medications         Primary Care Provider Office Phone # Fax #    Lisa BLACKWELL YU Merino [phone] [address]  [facility] [address]        Equal Access to Services     NANO BOYER : Hadii aad ku hadasho Soomaali, waaxda luqadaha, qaybta kaalmada adeegyada, waxay idiin hayaan adeeg khdeosh lasamanthan . So [facility] [phone].    ATENCIÓN: Si habla español, tiene a sherwood disposición servicios gratuitos de asistencia lingüística. Jarad al [phone].    We comply with applicable federal civil rights laws and [place] laws. We do not discriminate on the basis of race, color, national origin, age, disability, sex, sexual orientation, or gender identity.            Thank you!     Thank you for choosing [facility]  for your care. Our goal is always to provide you with excellent care. Hearing back from our patients is one way we can continue to improve our services. Please take a few minutes to complete the written survey that you may receive in the mail after your visit with us. Thank you!             Your Updated Medication List - Protect others around you: Learn how to safely use, store and throw away your medicines at www.disposemymeds.org.          This list is accurate as of 10/23/18  2:03 PM.  Always use your most recent med list.                   Brand Name Dispense Instructions for use Diagnosis    Aspercreme 10 % cream   Generic drug:  trolamine salicylate      daily use x4 times        aspirin 325 MG EC tablet      2 TABLET daily        CALCIUM + D PO      BID        cetirizine HCl 10 MG Caps    ZYRTEC ALLERGY    90 capsule    Take 10 mg by mouth daily Take  by mouth.    Allergic rhinitis due to other allergen       fluticasone 50 MCG/ACT spray    FLONASE     Spray 2 sprays into both nostrils as needed        hydrOXYzine 25 MG tablet    ATARAX    30 tablet    TAKE TWO TABLETS BY  MOUTH DAILY AT BEDTIME AS NEEDED FOR ANXIETY    Anxiety       methocarbamol 750 MG tablet    ROBAXIN    180 tablet    Take 1 tablet (750 mg) by mouth every 6 hours as needed for muscle spasms    Chronic pain syndrome       MULTIVITAMIN TABS   OR      Take one daily        nicotine polacrilex 2 MG gum    NICORETTE STARTER KIT    30 tablet    Place 1 each (2 mg) inside cheek as needed for smoking cessation    Tobacco abuse       oxyCODONE IR 5 MG tablet   Start taking on:  12/27/2018    ROXICODONE    150 tablet    Take 1-2 tablets by mouth. Every 4-6 hours as needed for pain  *Must last 30 days    Chronic pain syndrome       pantoprazole 40 MG EC tablet    PROTONIX    90 tablet    Take 1 tablet (40 mg) by mouth daily    Gastroesophageal reflux disease without esophagitis       PARoxetine 20 MG tablet    PAXIL    45 tablet    Take 0.5 tablets (10 mg) by mouth daily    Major depressive disorder, recurrent, in full remission (H)       PROAIR  (90 Base) MCG/ACT inhaler   Generic drug:  albuterol     8.5 g    INHALE 2 PUFFS INTO THE LUNGS EVERY 6 HOURS AS NEEDED FOR SHORTNESS OF BREATH / DYSPNEA OR WHEEZING    Tobacco abuse       simvastatin 20 MG tablet    ZOCOR    90 tablet    Take  by mouth. 1 TABLET AT BEDTIME    Hyperlipidemia LDL goal <130

## 2019-01-24 DIAGNOSIS — G89.4 CHRONIC PAIN SYNDROME: ICD-10-CM

## 2019-01-24 RX ORDER — OXYCODONE HYDROCHLORIDE 5 MG/1
TABLET ORAL
Qty: 150 TABLET | Refills: 0 | Status: SHIPPED | OUTPATIENT
Start: 2019-01-26 | End: 2019-02-06

## 2019-01-24 NOTE — TELEPHONE ENCOUNTER
Reason for Call:  Medication or medication refill:    Do you use a [facility]?  Name of the pharmacy and phone number for the current request:  [id number] - [place], [place] - [address]    Name of the medication requested: oxyCODONE IR (ROXICODONE) 5 MG tablet    Other request: Patient is requesting a refill on this medication, to last until her visit with PCP on Tuesday, 1/29/2019. She was scheduled for today but had to cancel due to the weather and her car not starting. Please assist. Thanks! States she will be out of medication tomorrow.    Can we leave a detailed message on this number? YES    Phone number patient can be reached at: Home number on file [phone] (home)    Best Time: Any    Call taken on 1/24/2019 at 11:13 AM by Mily Hay

## 2019-01-24 NOTE — TELEPHONE ENCOUNTER
Controlled Substance Refill Request for Oxycodone 5 mg tablets     Last refill: 12/27/18 #150   (request is NOT a full month from last fill 3 days early)     Last clinic visit: 10/23/18 - patient had appt. scheduled for today however per note below she can cancelled due to weather and car trouble     Clinic visit frequency required: Q 3 months  Next appt:     Controlled substance agreement on file: Yes:  Date 8/4/2015.    Documentation in problem list reviewed:  Yes  Patient Active Problem List    Diagnosis Date Noted     Continuous opioid dependence (H) 07/19/2018     Priority: Medium     Anxiety 02/06/2017     Priority: Medium     Pulmonary emphysema, unspecified emphysema type (H) 04/25/2016     Priority: Medium     Chronic pain 08/04/2015     Priority: Medium     Patient is followed by SANDI DE LEON for ongoing prescription of pain medication.  All refills should be approved by this provider, or covering partner.    Medication(s): Oxycodone 5 mg  .   Maximum quantity per month: 150  Clinic visit frequency required: Q 3 months     Controlled substance agreement on file: Yes       Date(s): 8/4/15    Pain Clinic evaluation in the past: Yes       Date(s):  8/24/10       Location(s):  [facility] Total Score(s):  No flowsheet data found.    Last [facility] website verification:  done on 8/4/15   [email or web address]         Advanced directives, counseling/discussion 04/17/2014     Priority: Medium     Gave patient advance directive packet and contact number for NATHANIEL Back        Allergic rhinitis due to other allergen 12/05/2012     Priority: Medium     SI (sacroiliac) joint dysfunction 04/28/2011     Priority: Medium     HYPERLIPIDEMIA LDL GOAL <130 10/31/2010     Priority: Medium     Major depression in full remission (H) 10/19/2010     Priority: Medium     GENITAL HERPES  09/09/2005     Priority: Medium     Disorder of bone and cartilage 03/29/2005     Priority: Medium     Problem list name updated by  automated process. Provider to review       Temporomandibular joint disorder 03/29/2005     Priority: Medium     Problem list name updated by automated process. Provider to review       Esophageal reflux 03/29/2005     Priority: Medium     Backache 03/29/2005     Priority: Medium     degenerative disc disease  cervical osteoarthritis  Patient is followed by SANDI DE LEON for ongoing prescription of narcotic pain medicine.  Med: Percocet.   Maximum use per month: 150  Expected duration: chronic  Narcotic agreement on file: YES  Clinic visit recommended: Q 3 months    Problem list name updated by automated process. Provider to review       Headache 03/29/2005     Priority: Medium     chronic  Problem list name updated by automated process. Provider to review       Hemangioma 03/29/2005     Priority: Medium     Cavernous angioma right temporal lobe  Problem list name updated by automated process. Provider to review       NEUROPATHY 03/29/2005     Priority: Medium     TREMOR  03/29/2005     Priority: Medium     head       CHRONIC NECK PAIN 02/24/2005     Priority: Medium     degenerative disc disease       Processing:  Fax Rx to [facility]    RX monitoring program ([place]) reviewed: YES   Last fill #150 on 12/28/18 30 day supply     [place] profile:  [email or web address]    Dakota York   [facility]

## 2019-01-25 NOTE — TELEPHONE ENCOUNTER
Patient will  RX for oxycodone at  and placing in the accordian file. She will bring ID.  Portia Finnegan RN

## 2019-01-25 NOTE — TELEPHONE ENCOUNTER
LMOM that RX is ready and needs to be picked up in person so she needs to call and let us know if she is coming or someone else she designates.  Portia Finnegan RN

## 2019-02-06 ENCOUNTER — OFFICE VISIT (OUTPATIENT)
Dept: FAMILY MEDICINE | Facility: CLINIC | Age: 69
End: 2019-02-06
Payer: COMMERCIAL

## 2019-02-06 VITALS
WEIGHT: 144.38 LBS | TEMPERATURE: 96.7 F | HEART RATE: 67 BPM | OXYGEN SATURATION: 97 % | BODY MASS INDEX: 26.84 KG/M2 | RESPIRATION RATE: 18 BRPM

## 2019-02-06 DIAGNOSIS — G89.4 CHRONIC PAIN SYNDROME: ICD-10-CM

## 2019-02-06 DIAGNOSIS — F11.20 CONTINUOUS OPIOID DEPENDENCE (H): ICD-10-CM

## 2019-02-06 DIAGNOSIS — J43.9 PULMONARY EMPHYSEMA, UNSPECIFIED EMPHYSEMA TYPE (H): ICD-10-CM

## 2019-02-06 DIAGNOSIS — F33.42 MAJOR DEPRESSIVE DISORDER, RECURRENT, IN FULL REMISSION (H): ICD-10-CM

## 2019-02-06 DIAGNOSIS — E78.5 HYPERLIPIDEMIA LDL GOAL <130: ICD-10-CM

## 2019-02-06 PROCEDURE — 99214 OFFICE O/P EST MOD 30 MIN: CPT | Performed by: NURSE PRACTITIONER

## 2019-02-06 RX ORDER — METHOCARBAMOL 750 MG/1
750 TABLET, FILM COATED ORAL EVERY 6 HOURS PRN
Qty: 180 TABLET | Status: SHIPPED | OUTPATIENT
Start: 2019-02-06 | End: 2019-05-23

## 2019-02-06 RX ORDER — OXYCODONE HYDROCHLORIDE 5 MG/1
TABLET ORAL
Qty: 150 TABLET | Refills: 0 | Status: SHIPPED | OUTPATIENT
Start: 2019-03-27 | End: 2019-02-06

## 2019-02-06 RX ORDER — OXYCODONE HYDROCHLORIDE 5 MG/1
TABLET ORAL
Qty: 150 TABLET | Refills: 0 | Status: SHIPPED | OUTPATIENT
Start: 2019-04-26 | End: 2019-05-23

## 2019-02-06 RX ORDER — OXYCODONE HYDROCHLORIDE 5 MG/1
TABLET ORAL
Qty: 150 TABLET | Refills: 0 | Status: SHIPPED | OUTPATIENT
Start: 2019-02-27 | End: 2019-02-06

## 2019-02-06 RX ORDER — OXYCODONE HYDROCHLORIDE 5 MG/1
TABLET ORAL
Qty: 150 TABLET | Refills: 0 | Status: SHIPPED | OUTPATIENT
Start: 2019-02-25 | End: 2019-02-06

## 2019-02-06 RX ORDER — SIMVASTATIN 20 MG
TABLET ORAL
Qty: 90 TABLET | Refills: 3 | Status: SHIPPED | OUTPATIENT
Start: 2019-02-06 | End: 2020-02-13

## 2019-02-06 RX ORDER — PAROXETINE 20 MG/1
10 TABLET, FILM COATED ORAL DAILY
Qty: 45 TABLET | Status: SHIPPED | OUTPATIENT
Start: 2019-02-06 | End: 2019-05-23

## 2019-02-06 ASSESSMENT — PATIENT HEALTH QUESTIONNAIRE - PHQ9: SUM OF ALL RESPONSES TO PHQ QUESTIONS 1-9: 0

## 2019-02-06 NOTE — PROGRESS NOTES
SUBJECTIVE:   Shania Holliday is a 68 year old female who presents to clinic today for the following health issues:      Chronic Pain Follow-Up       Type / Location of Pain: jaw, neck, spinal pain, left knee, feet pain   Analgesia/pain control:       Recent changes:  same      Overall control: Tolerable with discomfort  Activity level/function:      Daily activities:  Able to do light housework, cooking with pain medication     Work:  not applicable  Adverse effects:  No  Adherance    Taking medication as directed?  Yes    Participating in other treatments: not at present   Risk Factors:    Sleep:  Good    Mood/anxiety:  controlled    Recent family or social stressors:  none noted    Other aggravating factors: prolonged sitting, prolonged standing and clenching of jaw  PHQ-9 SCORE 1/31/2018 7/19/2018 2/6/2019   PHQ-9 Total Score - - -   PHQ-9 Total Score 0 0 0     NASRA-7 SCORE 10/29/2015 11/14/2016 1/31/2018   Total Score - - -   Total Score 0 0 0     Encounter-Level CSA - 08/04/2015:    Controlled Substance Agreement - Scan on 8/6/2015  2:50 PM: CONTROLLED SUBSTANCE AGREEMENT (below)       Patient-Level CSA:    There are no patient-level csa.         Amount of exercise or physical activity: none. Up and down the stairs. Stretching everyday     Problems taking medications regularly: No    Medication side effects: none    Diet: regular (no restrictions)      Pain is stable with oxycodone, 5 tablets daily.      Problem list and histories reviewed & adjusted, as indicated.  Additional history: as documented        Reviewed and updated as needed this visit by clinical staff  Tobacco  Allergies  Meds  Med Hx  Surg Hx  Fam Hx  Soc Hx      Reviewed and updated as needed this visit by Provider         ROS:  CONSTITUTIONAL: NEGATIVE for fever, chills, change in weight  ENT/MOUTH: NEGATIVE for ear, mouth and throat problems  RESP: NEGATIVE for significant cough or SOB  CV: NEGATIVE for chest pain, palpitations or  peripheral edema  GI: NEGATIVE for nausea, abdominal pain, heartburn, or change in bowel habits  MUSCULOSKELETAL: see HPI  NEURO: NEGATIVE for weakness, dizziness or paresthesias  PSYCHIATRIC: NEGATIVE for changes in mood or affect    OBJECTIVE:     BP (P) 120/70   Pulse 67   Temp 96.7  F (35.9  C) (Oral)   Resp 18   Wt 65.5 kg (144 lb 6 oz)   SpO2 97%   BMI 26.84 kg/m    Body mass index is 26.84 kg/m .  GENERAL: healthy, alert and no distress  MS: reduced ROM of cervical and lumbar spine  PSYCH: mentation appears normal, affect normal/bright        ASSESSMENT/PLAN:     (G89.4) Chronic pain syndrome  Comment:   Plan: methocarbamol (ROBAXIN) 750 MG tablet,         oxyCODONE (ROXICODONE) 5 MG tablet,         DISCONTINUED: oxyCODONE (ROXICODONE) 5 MG         tablet, DISCONTINUED: oxyCODONE (ROXICODONE) 5         MG tablet, DISCONTINUED: oxyCODONE (ROXICODONE)        5 MG tablet        Refills given for 3 months.    Follow up in 3 months for annual exam.     (F11.20) Continuous opioid dependence (H)  Comment:   Plan: Appropriate use.     (F33.42) Major depressive disorder, recurrent, in full remission (H)  Comment:   Plan: PARoxetine (PAXIL) 20 MG tablet        The current medical regimen is effective;  continue present plan and medications.     (E78.5) Hyperlipidemia LDL goal <130  Comment:   Plan: simvastatin (ZOCOR) 20 MG tablet        The current medical regimen is effective;  continue present plan and medications.     (J43.9) Pulmonary emphysema, unspecified emphysema type (H)  Comment: stable  Plan: Discussed smoking cessation.             Lisa Merino NP  [facility]

## 2019-05-17 ENCOUNTER — TELEPHONE (OUTPATIENT)
Dept: FAMILY MEDICINE | Facility: CLINIC | Age: 69
End: 2019-05-17

## 2019-05-17 NOTE — TELEPHONE ENCOUNTER
Patient requesting to speak with Lisa Merino's nurse.  Has questions about methocarbamol refill.  Please call today.  OpK to LM on VM

## 2019-05-17 NOTE — TELEPHONE ENCOUNTER
Prior auth needs to be started-   Insurance said this is not on the formulary    Thanks!     Daysi Cintron RN

## 2019-05-20 ENCOUNTER — ANCILLARY PROCEDURE (OUTPATIENT)
Dept: MAMMOGRAPHY | Facility: CLINIC | Age: 69
End: 2019-05-20
Attending: NURSE PRACTITIONER
Payer: COMMERCIAL

## 2019-05-20 ENCOUNTER — OFFICE VISIT (OUTPATIENT)
Dept: FAMILY MEDICINE | Facility: CLINIC | Age: 69
End: 2019-05-20
Payer: COMMERCIAL

## 2019-05-20 VITALS
OXYGEN SATURATION: 96 % | HEIGHT: 62 IN | DIASTOLIC BLOOD PRESSURE: 77 MMHG | TEMPERATURE: 98 F | WEIGHT: 146 LBS | HEART RATE: 98 BPM | BODY MASS INDEX: 26.87 KG/M2 | SYSTOLIC BLOOD PRESSURE: 115 MMHG | RESPIRATION RATE: 16 BRPM

## 2019-05-20 DIAGNOSIS — T63.444A BEE STING REACTION, UNDETERMINED INTENT, INITIAL ENCOUNTER: Primary | ICD-10-CM

## 2019-05-20 DIAGNOSIS — Z12.31 VISIT FOR SCREENING MAMMOGRAM: ICD-10-CM

## 2019-05-20 PROCEDURE — 77067 SCR MAMMO BI INCL CAD: CPT | Mod: TC

## 2019-05-20 PROCEDURE — 99213 OFFICE O/P EST LOW 20 MIN: CPT | Performed by: FAMILY MEDICINE

## 2019-05-20 RX ORDER — PREDNISONE 20 MG/1
TABLET ORAL
Qty: 20 TABLET | Refills: 0 | Status: SHIPPED | OUTPATIENT
Start: 2019-05-20 | End: 2019-11-14

## 2019-05-20 ASSESSMENT — PAIN SCALES - GENERAL: PAINLEVEL: NO PAIN (0)

## 2019-05-20 ASSESSMENT — MIFFLIN-ST. JEOR: SCORE: 1150.5

## 2019-05-20 NOTE — PROGRESS NOTES
"SUBJECTIVE:  68 year old.The patient has a complaint of bee sting.  This started 4 days ago. Location left leg  quality itches Associated symptoms are painful.  Not getting more painful and now has rash which in size jhas not changed. Has been using Benadyl q 4 hours and no relief. ROS no fever or chills      Reviewed health maintenance  Patient Active Problem List   Diagnosis     CHRONIC NECK PAIN     Disorder of bone and cartilage     Temporomandibular joint disorder     Esophageal reflux     Backache     Headache     Hemangioma     NEUROPATHY     TREMOR      GENITAL HERPES      Major depression in full remission (H)     HYPERLIPIDEMIA LDL GOAL <130     SI (sacroiliac) joint dysfunction     Allergic rhinitis due to other allergen     Advanced directives, counseling/discussion     Chronic pain     Pulmonary emphysema, unspecified emphysema type (H)     Anxiety     Continuous opioid dependence (H)     Past Medical History:   Diagnosis Date     Backache, unspecified      Cervicalgia      Depressive disorder, not elsewhere classified      Disorder of bone and cartilage, unspecified      Esophageal reflux      Essential and other specified forms of tremor      Headache(784.0)      Hemangioma of unspecified site      Other genital herpes      Other specified idiopathic peripheral neuropathy      Pure hypercholesterolemia      Temporomandibular joint disorders, unspecified      Tobacco use disorder        OBJECTIVE:  no apparent distress  /77   Pulse 98   Temp 98  F (36.7  C) (Oral)   Resp 16   Ht 1.575 m (5' 2\")   Wt 66.2 kg (146 lb)   SpO2 96%   BMI 26.70 kg/m    Two areas of leg with red raised rash 10 cm by 7, 5 cm by 4 cm, 6 cm by 4 cm.       ICD-10-CM    1. Bee sting reaction, undetermined intent, initial encounter T63.444A     PLAN: Retun to clinic if fever or chills      "

## 2019-05-20 NOTE — TELEPHONE ENCOUNTER
Central Prior Authorization Team   Phone: [phone]      PA Initiation    Medication: generic robaxin-Initiated  Insurance Company: [employer] - Phone [phone] Fax [phone]  Pharmacy Filling the Rx: [id number] - [place], [place] - [facility] [address]  Filling Pharmacy Phone: [phone]  Filling Pharmacy Fax:    Start Date: 5/20/2019

## 2019-05-20 NOTE — TELEPHONE ENCOUNTER
Prior Authorization Approval    Authorization Effective Date: 4/20/2019  Authorization Expiration Date: 5/19/2020  Medication: generic robaxin-APPROVED  Approved Dose/Quantity:   Reference #:     Insurance Company: [employer] - Phone [phone] Fax [phone]  Expected CoPay:       CoPay Card Available:      Foundation Assistance Needed:    Which Pharmacy is filling the prescription (Not needed for infusion/clinic administered): [facility][id number] - [place], [place] - [facility] [address]  Pharmacy Notified: Yes  Patient Notified: No    Pharmacy will notify patient when medication is ready.

## 2019-05-20 NOTE — NURSING NOTE
"Chief Complaint   Patient presents with     Insect Bites     left thigh (3 sting) for 5 days       Initial /77   Pulse 98   Temp 98  F (36.7  C) (Oral)   Resp 16   Ht 1.575 m (5' 2\")   Wt 66.2 kg (146 lb)   SpO2 96%   BMI 26.70 kg/m   Estimated body mass index is 26.7 kg/m  as calculated from the following:    Height as of this encounter: 1.575 m (5' 2\").    Weight as of this encounter: 66.2 kg (146 lb).  Medication Reconciliation: complete    BANDAR Lind MA    "

## 2019-05-23 ENCOUNTER — OFFICE VISIT (OUTPATIENT)
Dept: FAMILY MEDICINE | Facility: CLINIC | Age: 69
End: 2019-05-23
Payer: COMMERCIAL

## 2019-05-23 VITALS — BODY MASS INDEX: 26.89 KG/M2 | WEIGHT: 147 LBS | RESPIRATION RATE: 18 BRPM

## 2019-05-23 DIAGNOSIS — F41.9 ANXIETY: ICD-10-CM

## 2019-05-23 DIAGNOSIS — K21.9 GASTROESOPHAGEAL REFLUX DISEASE WITHOUT ESOPHAGITIS: ICD-10-CM

## 2019-05-23 DIAGNOSIS — G89.4 CHRONIC PAIN SYNDROME: ICD-10-CM

## 2019-05-23 DIAGNOSIS — F33.42 MAJOR DEPRESSIVE DISORDER, RECURRENT, IN FULL REMISSION (H): ICD-10-CM

## 2019-05-23 PROCEDURE — 99214 OFFICE O/P EST MOD 30 MIN: CPT | Performed by: NURSE PRACTITIONER

## 2019-05-23 RX ORDER — PAROXETINE 20 MG/1
10 TABLET, FILM COATED ORAL DAILY
Qty: 45 TABLET | Status: SHIPPED | OUTPATIENT
Start: 2019-05-23 | End: 2020-08-12

## 2019-05-23 RX ORDER — OXYCODONE HYDROCHLORIDE 5 MG/1
TABLET ORAL
Qty: 150 TABLET | Refills: 0 | Status: SHIPPED | OUTPATIENT
Start: 2019-08-24 | End: 2019-07-22

## 2019-05-23 RX ORDER — OXYCODONE HYDROCHLORIDE 5 MG/1
TABLET ORAL
Qty: 150 TABLET | Refills: 0 | Status: SHIPPED | OUTPATIENT
Start: 2019-07-25 | End: 2019-05-23

## 2019-05-23 RX ORDER — HYDROXYZINE HYDROCHLORIDE 25 MG/1
TABLET, FILM COATED ORAL
Qty: 180 TABLET | Status: SHIPPED | OUTPATIENT
Start: 2019-05-23 | End: 2020-06-05

## 2019-05-23 RX ORDER — OXYCODONE HYDROCHLORIDE 5 MG/1
TABLET ORAL
Qty: 150 TABLET | Refills: 0 | Status: SHIPPED | OUTPATIENT
Start: 2019-05-25 | End: 2019-05-23

## 2019-05-23 RX ORDER — PANTOPRAZOLE SODIUM 40 MG/1
40 TABLET, DELAYED RELEASE ORAL DAILY
Qty: 90 TABLET | Status: SHIPPED | OUTPATIENT
Start: 2019-05-23 | End: 2020-08-27

## 2019-05-23 RX ORDER — METHOCARBAMOL 750 MG/1
750 TABLET, FILM COATED ORAL EVERY 6 HOURS PRN
Qty: 180 TABLET | Status: SHIPPED | OUTPATIENT
Start: 2019-05-23 | End: 2020-02-13

## 2019-05-23 NOTE — PROGRESS NOTES
Subjective     Shania Holliday is a 68 year old female who presents to clinic today for the following health issues:    HPI     Chronic Pain Follow-Up       Type / Location of Pain: chronic pain. Mainly spine pain   Analgesia/pain control:       Recent changes:  same      Overall control: Comfortably manageable  Activity level/function:      Daily activities:  Able to do light housework, cooking    Work:  not applicable  Adverse effects:  No  Adherance    Taking medication as directed?  Yes    Participating in other treatments: not at present   Risk Factors:    Sleep:  Poor, better when taking Atarax     Mood/anxiety:  controlled    Recent family or social stressors:  none noted    Other aggravating factors: none  PHQ-9 SCORE 1/31/2018 7/19/2018 2/6/2019   PHQ-9 Total Score - - -   PHQ-9 Total Score 0 0 0     NASRA-7 SCORE 10/29/2015 11/14/2016 1/31/2018   Total Score - - -   Total Score 0 0 0     Encounter-Level CSA - 08/04/2015:    Controlled Substance Agreement - Scan on 8/6/2015  2:50 PM: CONTROLLED SUBSTANCE AGREEMENT (below)       Patient-Level CSA:    There are no patient-level csa.         Amount of exercise or physical activity: everyday cleaning the house. Walking as much as she can. Working in the garden     Problems taking medications regularly: No    Medication side effects: none    Diet: regular (no restrictions)    Pain is stable with oxycodone, 5 tablets daily.     Mood is stable.  She is doing well on Paxil.        ED/UC Followup:    Facility: [facility]  Date of visit: 5/20/2019  Reason for visit: Bee sting  Current Status: currently taking Prednisone; feeling sweaty, shaking, and nausea      Localized reaction to bee sting.  The warmth and swelling has resolved.    No fevers or increased redness.              Reviewed and updated as needed this visit by Provider  Tobacco  Allergies  Meds  Problems  Med Hx  Surg Hx  Fam Hx         Review of Systems   ROS COMP: CONSTITUTIONAL: NEGATIVE for fever,  chills, change in weight  INTEGUMENTARY/SKIN: see HPI  ENT/MOUTH: NEGATIVE for ear, mouth and throat problems  RESP: NEGATIVE for significant cough or SOB  CV: NEGATIVE for chest pain, palpitations or peripheral edema  GI: NEGATIVE for nausea, abdominal pain, heartburn, or change in bowel habits  MUSCULOSKELETAL: see HPI  PSYCHIATRIC: NEGATIVE for changes in mood or affect      Objective    BP (P) 144/83   Pulse (P) 68   Temp (P) 97.4  F (36.3  C) (Oral)   Resp 18   Wt 66.7 kg (147 lb)   SpO2 (P) 97%   BMI 26.89 kg/m    Body mass index is 26.89 kg/m .  Physical Exam   GENERAL: healthy, alert and no distress  RESP: lungs clear to auscultation - no rales, rhonchi or wheezes  CV: regular rate and rhythm, normal S1 S2, no S3 or S4, no murmur, click or rub, no peripheral edema and peripheral pulses strong  SKIN: large dusky erythematous patch left lower leg, no warmth or edema, consistent with hemosiderin staining  PSYCH: mentation appears normal, affect normal/bright            Assessment & Plan     1. Chronic pain syndrome  Monitoring high risk medication.  The current medical regimen is effective;  continue present plan and medications.   Refills given for 3 months.  Follow up in 3 months.   - methocarbamol (ROBAXIN) 750 MG tablet; Take 1 tablet (750 mg) by mouth every 6 hours as needed for muscle spasms  Dispense: 180 tablet; Refill: PRN  - oxyCODONE (ROXICODONE) 5 MG tablet; Take 1-2 tablets by mouth. Every 4-6 hours as needed for pain   *Must last 30 days  Dispense: 150 tablet; Refill: 0    2. Anxiety  The current medical regimen is effective;  continue present plan and medications.   - hydrOXYzine (ATARAX) 25 MG tablet; TAKE TWO TABLETS BY MOUTH DAILY AT BEDTIME AS NEEDED FOR ANXIETY  Dispense: 180 tablet; Refill: PRN    3. Major depressive disorder, recurrent, in full remission (H)  The current medical regimen is effective;  continue present plan and medications.   - PARoxetine (PAXIL) 20 MG tablet; Take  "0.5 tablets (10 mg) by mouth daily  Dispense: 45 tablet; Refill: PRN    4. Gastroesophageal reflux disease without esophagitis  The current medical regimen is effective;  continue present plan and medications.   - pantoprazole (PROTONIX) 40 MG EC tablet; Take 1 tablet (40 mg) by mouth daily  Dispense: 90 tablet; Refill: PRN     Tobacco Cessation:   reports that she has been smoking cigarettes.  She has a 19.00 pack-year smoking history. She has never used smokeless tobacco.  Tobacco Cessation Action Plan: Information offered: Patient not interested at this time      BMI:   Estimated body mass index is 26.89 kg/m  as calculated from the following:    Height as of 5/20/19: 1.575 m (5' 2\").    Weight as of this encounter: 66.7 kg (147 lb).               Return in about 3 months (around 8/23/2019) for Physical Exam.    Lisa Merino NP  [facility]        "

## 2019-05-24 ENCOUNTER — TELEPHONE (OUTPATIENT)
Dept: FAMILY MEDICINE | Facility: CLINIC | Age: 69
End: 2019-05-24

## 2019-05-29 NOTE — TELEPHONE ENCOUNTER
Prior Authorization Approval    Authorization Effective Date: 4/29/2019  Authorization Expiration Date: 5/28/2020  Medication: hydrOXYzine (ATARAX) 25 MG-APPROVED  Approved Dose/Quantity:    Reference #: [id number]   Insurance Company: [employer] - Phone [phone] Fax [phone]  Expected CoPay:       CoPay Card Available:      Foundation Assistance Needed:    Which Pharmacy is filling the prescription (Not needed for infusion/clinic administered): [facility] #[id number] - [place] [address] [address]  Pharmacy Notified: Yes  Patient Notified: Yes

## 2019-05-29 NOTE — TELEPHONE ENCOUNTER
Central Prior Authorization Team   Phone: [phone]    PA Initiation    Medication: hydrOXYzine (ATARAX) 25 MG tablet  Insurance Company: [employer] - Phone [phone] Fax [phone]  Pharmacy Filling the Rx: [facility][id number] [place] [address] [address]  Filling Pharmacy Phone: [phone]  Filling Pharmacy Fax: [phone]  Start Date: 5/29/2019

## 2019-06-21 ENCOUNTER — TELEPHONE (OUTPATIENT)
Dept: FAMILY MEDICINE | Facility: CLINIC | Age: 69
End: 2019-06-21

## 2019-06-21 DIAGNOSIS — G89.4 CHRONIC PAIN SYNDROME: ICD-10-CM

## 2019-06-21 RX ORDER — OXYCODONE HYDROCHLORIDE 5 MG/1
TABLET ORAL
Qty: 150 TABLET | Refills: 0 | Status: SHIPPED | OUTPATIENT
Start: 2019-06-25 | End: 2019-08-07

## 2019-06-21 NOTE — TELEPHONE ENCOUNTER
Reason for Call:  Other prescription    Detailed comments: Pt calling in regards to oxyCODONE (ROXICODONE) 5 MG tablet. She states she never received Janee's refill. She has May, July and August.     Phone Number Patient can be reached at: Home number on file [phone] (home)    Best Time: anytime    Can we leave a detailed message on this number? YES    Call taken on 6/21/2019 at 1:18 PM by Marguerite Kaba

## 2019-06-21 NOTE — TELEPHONE ENCOUNTER
Pt notified and will  the hard copy from the  and follow up with curly in July!  Thanks!     Daysi Cintron RN

## 2019-06-21 NOTE — TELEPHONE ENCOUNTER
Pt didn't get her 6/25/19 rx for oxy  Are you able to do this since pt will be out on Tuesday- and Marisa Merino CNP is gone until July 2  [facility]  However- it does appear as though the June 25 rx was missed.so went 5/19-7/19-8/19    Thanks!     Daysi Cintron RN

## 2019-06-24 NOTE — PROGRESS NOTES
SUBJECTIVE:   Shania Holliday is a 67 year old female who presents to clinic today for the following health issues:        SUBJECTIVE:                                                    Shania Holliday is a 67 year old female who presents to clinic today for the following health issues:      Chronic Pain Follow-Up       Type / Location of Pain: Chronic pain   Analgesia/pain control:       Recent changes:  Same and Improved sleep after starting Atarax.       Overall control: Comfortably manageable  Activity level/function:      Daily activities:  Able to do light housework, cooking. Some things are difficult but with the pain medication pt can get through it on most days.    Work:  retired   Adverse effects:  No  Adherance    Taking medication as directed?  Yes    Participating in other treatments: no  Risk Factors:    Sleep:  Good    Mood/anxiety:  controlled    Recent family or social stressors:  none noted    Other aggravating factors: problems with Bunion  Terrible toe cramps at night and during day.   PHQ-9 SCORE 11/2/2017 1/31/2018 7/19/2018   Total Score - - -   Total Score 1 0 0     NASRA-7 SCORE 10/29/2015 11/14/2016 1/31/2018   Total Score - - -   Total Score 0 0 0     Encounter-Level CSA - 08/04/2015:          Controlled Substance Agreement - Scan on 8/6/2015  2:50 PM : CONTROLLED SUBSTANCE AGREEMENT (below)                Amount of exercise or physical activity: none and watching grandson     Problems taking medications regularly: No    Medication side effects: none  Diet: regular (no restrictions)      Pain is stable with oxycodone, 5 tablets daily.     Problem list and histories reviewed & adjusted, as indicated.  Additional history: as documented        Reviewed and updated as needed this visit by clinical staffTobacco  Allergies  Meds  Med Hx  Surg Hx  Fam Hx  Soc Hx      Reviewed and updated as needed this visit by Provider         ROS:  C: NEGATIVE for fever, chills, change in weight  E/M: NEGATIVE  for ear, mouth and throat problems  R: NEGATIVE for significant cough or SOB  CV: NEGATIVE for chest pain, palpitations or peripheral edema  MUSCULOSKELETAL: chronic neck and back pain  NEURO: NEGATIVE for weakness, dizziness or paresthesias  PSYCHIATRIC: NEGATIVE for changes in mood or affect    OBJECTIVE:     /78  Pulse 79  Temp 97.2  F (36.2  C) (Oral)  Resp 18  Wt 146 lb 8 oz (66.5 kg)  SpO2 98%  BMI 27.23 kg/m2  Body mass index is 27.23 kg/(m^2).  GENERAL: healthy, alert and no distress  HEENT: TMs clear bilaterally  RESP: lungs clear to auscultation - no rales, rhonchi or wheezes  CV: regular rate and rhythm, normal S1 S2, no S3 or S4, no murmur, click or rub, no peripheral edema and peripheral pulses strong  MS: decreased ROM of the cervical spine  PSYCH: mentation appears normal, affect normal/bright        ASSESSMENT/PLAN:             1. Chronic pain syndrome  Refills given for 3 months.    Follow up in 3 months.   - oxyCODONE (ROXICODONE) 5 MG IR tablet; Take 1-2 tablets by mouth. Every 4-6 hours as needed for pain   *Must last 30 days  Dispense: 150 tablet; Refill: 0    ((F11.20) Continuous opioid dependence (H)  Comment: monitoring of high risk medications  Plan: Appropriate use.          Lisa Merino NP  [facility]               Intermediate Repair Preamble Text (Leave Blank If You Do Not Want): Wide undermining was performed with blunt dissection.
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
## 2019-07-18 DIAGNOSIS — G89.4 CHRONIC PAIN SYNDROME: ICD-10-CM

## 2019-07-18 NOTE — TELEPHONE ENCOUNTER
Reason for Call:  Medication or medication refill:    Do you use a [place] [facility]?  Name of the pharmacy and phone number for the current request:  [id number] - [place], [place] - [address]    Name of the medication requested: oxyCODONE (ROXICODONE) 5 MG tablet    Other request: Patient is requesting a refill on this medication. Please assist. She is scheduled for a physical with PCP on 8/7/2019 and will be out of medication prior. Please assist. Thanks!    Can we leave a detailed message on this number? YES    Phone number patient can be reached at: Home number on file [phone] (home)    Best Time: Any    Call taken on 7/18/2019 at 10:55 AM by Mily Hay

## 2019-07-19 NOTE — TELEPHONE ENCOUNTER
Requested Prescriptions   Pending Prescriptions Disp Refills     oxyCODONE (ROXICODONE) 5 MG tablet 150 tablet 0     Sig: Take 1-2 tablets by mouth. Every 4-6 hours as needed for pain   *Must last 30 days       There is no refill protocol information for this order              Last Written Prescription Date:  8/24/19  Last Fill Quantity: 150,   # refills: 0  Last Office Visit: 5/23/19  Future Office visit:    Next 5 appointments (look out 90 days)    Aug 07, 2019  1:45 PM CDT  PHYSICAL with Lisa Merino NP  [facility] [address] [place] [id number]  [phone]           Routing refill request to provider for review/approval because:  Looks like patient is running into new opiate law and rx would need to be re-written

## 2019-07-22 RX ORDER — OXYCODONE HYDROCHLORIDE 5 MG/1
TABLET ORAL
Qty: 150 TABLET | Refills: 0 | Status: SHIPPED | OUTPATIENT
Start: 2019-08-24 | End: 2019-07-24

## 2019-07-23 NOTE — TELEPHONE ENCOUNTER
Pt called back and spoke with OSWALDO Chung who stated she will p/u at .   Writer placed.       Marguerite AVILA     [facility]

## 2019-07-24 DIAGNOSIS — G89.4 CHRONIC PAIN SYNDROME: ICD-10-CM

## 2019-07-24 RX ORDER — OXYCODONE HYDROCHLORIDE 5 MG/1
TABLET ORAL
Qty: 150 TABLET | Refills: 0 | Status: SHIPPED | OUTPATIENT
Start: 2019-07-24 | End: 2019-08-07

## 2019-08-07 ENCOUNTER — OFFICE VISIT (OUTPATIENT)
Dept: FAMILY MEDICINE | Facility: CLINIC | Age: 69
End: 2019-08-07
Payer: COMMERCIAL

## 2019-08-07 VITALS
DIASTOLIC BLOOD PRESSURE: 66 MMHG | HEART RATE: 76 BPM | BODY MASS INDEX: 28.32 KG/M2 | HEIGHT: 61 IN | OXYGEN SATURATION: 98 % | WEIGHT: 150 LBS | TEMPERATURE: 98 F | SYSTOLIC BLOOD PRESSURE: 125 MMHG

## 2019-08-07 DIAGNOSIS — F33.42 RECURRENT MAJOR DEPRESSIVE DISORDER, IN FULL REMISSION (H): ICD-10-CM

## 2019-08-07 DIAGNOSIS — E78.5 HYPERLIPIDEMIA LDL GOAL <130: ICD-10-CM

## 2019-08-07 DIAGNOSIS — J30.89 ALLERGIC RHINITIS DUE TO OTHER ALLERGIC TRIGGER, UNSPECIFIED SEASONALITY: ICD-10-CM

## 2019-08-07 DIAGNOSIS — Z51.81 ENCOUNTER FOR THERAPEUTIC DRUG MONITORING: ICD-10-CM

## 2019-08-07 DIAGNOSIS — Z87.891 PERSONAL HISTORY OF TOBACCO USE: ICD-10-CM

## 2019-08-07 DIAGNOSIS — G89.4 CHRONIC PAIN SYNDROME: ICD-10-CM

## 2019-08-07 DIAGNOSIS — Z00.00 ROUTINE GENERAL MEDICAL EXAMINATION AT A HEALTH CARE FACILITY: Primary | ICD-10-CM

## 2019-08-07 LAB
AMPHETAMINES UR QL: NOT DETECTED NG/ML
BARBITURATES UR QL SCN: NOT DETECTED NG/ML
BENZODIAZ UR QL SCN: NOT DETECTED NG/ML
BUPRENORPHINE UR QL: NOT DETECTED NG/ML
CANNABINOIDS UR QL: NOT DETECTED NG/ML
COCAINE UR QL SCN: NOT DETECTED NG/ML
D-METHAMPHET UR QL: NOT DETECTED NG/ML
METHADONE UR QL SCN: NOT DETECTED NG/ML
OPIATES UR QL SCN: NOT DETECTED NG/ML
OXYCODONE UR QL SCN: ABNORMAL NG/ML
PCP UR QL SCN: ABNORMAL NG/ML
PROPOXYPH UR QL: NOT DETECTED NG/ML
TRICYCLICS UR QL SCN: NOT DETECTED NG/ML

## 2019-08-07 PROCEDURE — G0439 PPPS, SUBSEQ VISIT: HCPCS | Performed by: NURSE PRACTITIONER

## 2019-08-07 PROCEDURE — 80053 COMPREHEN METABOLIC PANEL: CPT | Performed by: NURSE PRACTITIONER

## 2019-08-07 PROCEDURE — 99207 C PAF COMPLETED  NO CHARGE: CPT | Performed by: NURSE PRACTITIONER

## 2019-08-07 PROCEDURE — 36415 COLL VENOUS BLD VENIPUNCTURE: CPT | Performed by: NURSE PRACTITIONER

## 2019-08-07 PROCEDURE — 99213 OFFICE O/P EST LOW 20 MIN: CPT | Mod: 25 | Performed by: NURSE PRACTITIONER

## 2019-08-07 PROCEDURE — G0296 VISIT TO DETERM LDCT ELIG: HCPCS | Mod: 25 | Performed by: NURSE PRACTITIONER

## 2019-08-07 PROCEDURE — 80306 DRUG TEST PRSMV INSTRMNT: CPT | Performed by: NURSE PRACTITIONER

## 2019-08-07 PROCEDURE — 80061 LIPID PANEL: CPT | Performed by: NURSE PRACTITIONER

## 2019-08-07 RX ORDER — OXYCODONE HYDROCHLORIDE 5 MG/1
TABLET ORAL
Qty: 150 TABLET | Refills: 0 | Status: SHIPPED | OUTPATIENT
Start: 2019-08-23 | End: 2019-08-07

## 2019-08-07 RX ORDER — OXYCODONE HYDROCHLORIDE 5 MG/1
TABLET ORAL
Qty: 140 TABLET | Refills: 0 | Status: SHIPPED | OUTPATIENT
Start: 2019-08-23 | End: 2019-09-19

## 2019-08-07 ASSESSMENT — ENCOUNTER SYMPTOMS
SORE THROAT: 0
BREAST MASS: 0
FEVER: 0
PALPITATIONS: 0
DIZZINESS: 0
DYSURIA: 0
HEMATOCHEZIA: 0
HEARTBURN: 0
HEMATURIA: 0
CONSTIPATION: 0
ABDOMINAL PAIN: 0
EYE PAIN: 0
JOINT SWELLING: 1
FREQUENCY: 0
COUGH: 0
CHILLS: 0
MYALGIAS: 1
DIARRHEA: 0
SHORTNESS OF BREATH: 0
NAUSEA: 0
PARESTHESIAS: 0
ARTHRALGIAS: 1
WEAKNESS: 0
HEADACHES: 0
NERVOUS/ANXIOUS: 0

## 2019-08-07 ASSESSMENT — ANXIETY QUESTIONNAIRES
6. BECOMING EASILY ANNOYED OR IRRITABLE: NOT AT ALL
GAD7 TOTAL SCORE: 0
7. FEELING AFRAID AS IF SOMETHING AWFUL MIGHT HAPPEN: NOT AT ALL
3. WORRYING TOO MUCH ABOUT DIFFERENT THINGS: NOT AT ALL
2. NOT BEING ABLE TO STOP OR CONTROL WORRYING: NOT AT ALL
7. FEELING AFRAID AS IF SOMETHING AWFUL MIGHT HAPPEN: NOT AT ALL
5. BEING SO RESTLESS THAT IT IS HARD TO SIT STILL: NOT AT ALL
4. TROUBLE RELAXING: NOT AT ALL
GAD7 TOTAL SCORE: 0
GAD7 TOTAL SCORE: 0
1. FEELING NERVOUS, ANXIOUS, OR ON EDGE: NOT AT ALL

## 2019-08-07 ASSESSMENT — PATIENT HEALTH QUESTIONNAIRE - PHQ9
SUM OF ALL RESPONSES TO PHQ QUESTIONS 1-9: 2
10. IF YOU CHECKED OFF ANY PROBLEMS, HOW DIFFICULT HAVE THESE PROBLEMS MADE IT FOR YOU TO DO YOUR WORK, TAKE CARE OF THINGS AT HOME, OR GET ALONG WITH OTHER PEOPLE: NOT DIFFICULT AT ALL
SUM OF ALL RESPONSES TO PHQ QUESTIONS 1-9: 2

## 2019-08-07 ASSESSMENT — MIFFLIN-ST. JEOR: SCORE: 1151.74

## 2019-08-07 ASSESSMENT — ACTIVITIES OF DAILY LIVING (ADL): CURRENT_FUNCTION: SHOPPING REQUIRES ASSISTANCE

## 2019-08-07 NOTE — PROGRESS NOTES
"SUBJECTIVE:   Shania Holliday is a 69 year old female who presents for Preventive Visit.    Are you in the first 12 months of your Medicare coverage?  No    Healthy Habits:     In general, how would you rate your overall health?  Good    Frequency of exercise:  4-5 days/week    Duration of exercise:  30-45 minutes    Do you usually eat at least 4 servings of fruit and vegetables a day, include whole grains    & fiber and avoid regularly eating high fat or \"junk\" foods?  No    Taking medications regularly:  Yes    Medication side effects:  None    Ability to successfully perform activities of daily living:  Shopping requires assistance    Home Safety:  No safety concerns identified    Hearing Impairment:  No hearing concerns    In the past 6 months, have you been bothered by leaking of urine?  No    In general, how would you rate your overall mental or emotional health?  Good      PHQ-2 Total Score: 0    Additional concerns today:  No  Allergy Concern     Do you feel safe in your environment? YES    Do you have a Health Care Directive? Yes: Patient states has Advance Directive and will bring in a copy to clinic.      Fall risk  Fallen 2 or more times in the past year?: No  Any fall with injury in the past year?: No    Cognitive Screening   1) Repeat 3 items (Leader, Season, Table)    2) Clock draw: NORMAL  3) 3 item recall: Recalls 1 object   Results: NORMAL clock, 1-2 items recalled: COGNITIVE IMPAIRMENT LESS LIKELY    Mini-CogTM Copyright DIMA Eubanks. Licensed by the author for use in [facility]; reprinted with permission (maxine@.[email or web address]). All rights reserved.      Do you have sleep apnea, excessive snoring or daytime drowsiness?: excessive snoring     Reviewed and updated as needed this visit by clinical staff  Tobacco  Allergies  Meds         Reviewed and updated as needed this visit by Provider        PHQ-9 SCORE 7/19/2018 2/6/2019 8/7/2019   PHQ-9 Total Score - - -   PHQ-9 Total Score MyChart - - 2 " (Minimal depression)   PHQ-9 Total Score 0 0 2         Social History     Tobacco Use     Smoking status: Current Every Day Smoker     Packs/day: 0.50     Years: 38.00     Pack years: 19.00     Types: Cigarettes     Smokeless tobacco: Never Used     Tobacco comment: 8 per day.    Substance Use Topics     Alcohol use: No     Alcohol/week: 0.0 oz         Alcohol Use 8/7/2019   Prescreen: >3 drinks/day or >7 drinks/week? Not Applicable   Prescreen: >3 drinks/day or >7 drinks/week? -         Her pain is stable on 5 oxycodone a day for cervical and lumbar DDD.    Her mood is stable, depression is well-controlled on Paxil.     Her allergies have been worse lately.  She is taking Zyrtec daily.        Current providers sharing in care for this patient include:   Patient Care Team:  Lisa Merino NP as PCP - General  Lisa Merino NP as Assigned PCP    The following health maintenance items are reviewed in Epic and correct as of today:  Health Maintenance   Topic Date Due     URINE DRUG SCREEN  1950     COPD ACTION PLAN  1950     ZOSTER IMMUNIZATION (1 of 2) 07/23/2001     TOBACCO CESSATION COUNSELING  04/25/2017     LUNG CANCER SCREENING ANNUAL  06/08/2018     NASRA ASSESSMENT  01/31/2019     ADVANCE CARE PLANNING  04/17/2019     MEDICARE ANNUAL WELLNESS VISIT  04/25/2019     PHQ-9  08/06/2019     INFLUENZA VACCINE (1) 09/01/2019     FALL RISK ASSESSMENT  05/20/2020     MAMMO SCREENING  05/20/2021     LIPID  04/25/2023     DTAP/TDAP/TD IMMUNIZATION (3 - Td) 07/30/2023     COLONOSCOPY  12/15/2024     DEXA  Completed     SPIROMETRY  Completed     HEPATITIS C SCREENING  Completed     DEPRESSION ACTION PLAN  Completed     PNEUMOCOCCAL IMMUNIZATION 65+ LOW/MEDIUM RISK  Completed     IPV IMMUNIZATION  Aged Out     MENINGITIS IMMUNIZATION  Aged Out           Review of Systems   Constitutional: Negative for chills and fever.   HENT: Positive for congestion. Negative for ear pain, hearing loss and sore throat.   "  Eyes: Negative for pain and visual disturbance.   Respiratory: Negative for cough and shortness of breath.    Cardiovascular: Negative for chest pain, palpitations and peripheral edema.   Gastrointestinal: Negative for abdominal pain, constipation, diarrhea, heartburn, hematochezia and nausea.   Breasts:  Negative for tenderness, breast mass and discharge.   Genitourinary: Negative for dysuria, frequency, genital sores, hematuria, pelvic pain, urgency, vaginal bleeding and vaginal discharge.   Musculoskeletal: Positive for arthralgias, joint swelling and myalgias.   Skin: Negative for rash.   Neurological: Negative for dizziness, weakness, headaches and paresthesias.   Psychiatric/Behavioral: Negative for mood changes. The patient is not nervous/anxious.          OBJECTIVE:   /66   Pulse 76   Temp 98  F (36.7  C) (Oral)   Ht 1.556 m (5' 1.25\")   Wt 68 kg (150 lb)   SpO2 98%   BMI 28.11 kg/m   Estimated body mass index is 28.11 kg/m  as calculated from the following:    Height as of this encounter: 1.556 m (5' 1.25\").    Weight as of this encounter: 68 kg (150 lb).  Physical Exam  GENERAL: healthy, alert and no distress  EYES: Eyes grossly normal to inspection, PERRL and conjunctivae and sclerae normal  HENT: ear canals and TM's normal, nose and mouth without ulcers or lesions  NECK: no adenopathy, no asymmetry, masses, or scars and thyroid normal to palpation  RESP: lungs clear to auscultation - no rales, rhonchi or wheezes  BREAST: normal without masses, tenderness or nipple discharge and no palpable axillary masses or adenopathy  CV: regular rate and rhythm, normal S1 S2, no S3 or S4, no murmur, click or rub, no peripheral edema and peripheral pulses strong  ABDOMEN: soft, nontender, no hepatosplenomegaly, no masses and bowel sounds normal  SKIN: no suspicious lesions or rashes  NEURO: Normal strength and tone, mentation intact and speech normal  PSYCH: mentation appears normal, affect " "normal/bright        ASSESSMENT / PLAN:   1. Routine general medical examination at a health care facility      2. Chronic pain syndrome  Stable   ws checked.   Urine drug screen ordered.  Refill given dated 8/23.  She will call for a refill on 9/19 and will write one for 9/20 ad 10/18 (28 day supply) and she will follow up on 11/14.  - oxyCODONE (ROXICODONE) 5 MG tablet; Take 1-2 tablets by mouth. Every 4-6 hours as needed for pain   *Must last 28 days  Dispense: 140 tablet; Refill: 0  - Drug Abuse Screen Panel 13, Urine (Pain Care Package)    3. Hyperlipidemia LDL goal <130  The current medical regimen is effective;  continue present plan and medications.   - Lipid panel reflex to direct LDL Fasting    4. Recurrent major depressive disorder, in full remission (H)  In remission  The current medical regimen is effective;  continue present plan and medications.     5. Encounter for therapeutic drug monitoring    - Comprehensive metabolic panel    6. Personal history of tobacco use    - Prof fee: Shared Decisionmaking for Lung Cancer Screening  - CT Chest Lung Cancer Scrn Low Dose wo; Future    7. Allergic rhinitis due to other allergic trigger, unspecified seasonality  Start Flonase.       End of Life Planning:  Patient currently has an advanced directive: Yes.  Practitioner is supportive of decision.    COUNSELING:  Reviewed preventive health counseling, as reflected in patient instructions    Estimated body mass index is 28.11 kg/m  as calculated from the following:    Height as of this encounter: 1.556 m (5' 1.25\").    Weight as of this encounter: 68 kg (150 lb).         reports that she has been smoking cigarettes.  She has a 19.00 pack-year smoking history. She has never used smokeless tobacco.  Tobacco Cessation Action Plan: Information offered: Patient not interested at this time    Appropriate preventive services were discussed with this patient, including applicable screening as appropriate for " cardiovascular disease, diabetes, osteopenia/osteoporosis, and glaucoma.  As appropriate for age/gender, discussed screening for colorectal cancer, prostate cancer, breast cancer, and cervical cancer. Checklist reviewing preventive services available has been given to the patient.    Reviewed patients plan of care and provided an AVS. The Basic Care Plan (routine screening as documented in Health Maintenance) for Shania meets the Care Plan requirement. This Care Plan has been established and reviewed with the Patient.    Counseling Resources:  ATP IV Guidelines  Pooled Cohorts Equation Calculator  Breast Cancer Risk Calculator  FRAX Risk Assessment  ICSI Preventive Guidelines  Dietary Guidelines for Americans, 2010  USDA's MyPlate  ASA Prophylaxis  Lung CA Screening    Lisa Merino NP  [facility]    Identified Health Risks:

## 2019-08-07 NOTE — LETTER
August 9, 2019      Shania Holliday  [address] [place] [address] [address] [facility] [address]        Dear ,    We are writing to inform you of your test results.    Cholesterol level is about the same as last time.   Kidney function is normal.     Resulted Orders   Lipid panel reflex to direct LDL Fasting   Result Value Ref Range    Cholesterol 205 (H) <200 mg/dL      Comment:      Desirable:       <200 mg/dl    Triglycerides 111 <150 mg/dL      Comment:      Fasting specimen    HDL Cholesterol 69 >49 mg/dL    LDL Cholesterol Calculated 114 (H) <100 mg/dL      Comment:      Above desirable:  100-129 mg/dl  Borderline High:  130-159 mg/dL  High:             160-189 mg/dL  Very high:       >189 mg/dl      Non HDL Cholesterol 136 (H) <130 mg/dL      Comment:      Above Desirable:  130-159 mg/dl  Borderline high:  160-189 mg/dl  High:             190-219 mg/dl  Very high:       >219 mg/dl     Comprehensive metabolic panel   Result Value Ref Range    Sodium 137 133 - 144 mmol/L    Potassium 4.2 3.4 - 5.3 mmol/L    Chloride 105 94 - 109 mmol/L    Carbon Dioxide 24 20 - 32 mmol/L    Anion Gap 8 3 - 14 mmol/L    Glucose 101 (H) 70 - 99 mg/dL      Comment:      Fasting specimen    Urea Nitrogen 8 7 - 30 mg/dL    Creatinine 0.82 0.52 - 1.04 mg/dL    GFR Estimate 74 >60 mL/min/[1.73_m2]      Comment:      Non  GFR Calc  Starting 12/18/2018, serum creatinine based estimated GFR (eGFR) will be   calculated using the Chronic Kidney Disease Epidemiology Collaboration   (CKD-EPI) equation.      GFR Estimate If Black 85 >60 mL/min/[1.73_m2]      Comment:       GFR Calc  Starting 12/18/2018, serum creatinine based estimated GFR (eGFR) will be   calculated using the Chronic Kidney Disease Epidemiology Collaboration   (CKD-EPI) equation.      Calcium 9.0 8.5 - 10.1 mg/dL    Bilirubin Total 0.5 0.2 - 1.3 mg/dL    Albumin 4.3 3.4 - 5.0 g/dL    Protein Total 7.4 6.8 - 8.8 g/dL    Alkaline Phosphatase 77 40 - 150 U/L     ALT 23 0 - 50 U/L    AST 18 0 - 45 U/L   Drug Abuse Screen Panel 13, Urine (Pain Care Package)   Result Value Ref Range    Cannabinoids (24-xlf-3-carboxy-9-THC) Not Detected NDET^Not Detected ng/mL      Comment:      Cutoff for a negative cannabinoid is 50 ng/mL or less.    Phencyclidine (Phencyclidine) Detected, Abnormal Result (A) NDET^Not Detected ng/mL      Comment:      Cutoff for a postive PCP is greater than 25 ng/ml.  This is an unconfirmed screening result to be used for medical purposes only.   Order [id number] for confirmation or individual confirmation tests to [employer].      Cocaine (Benzoylecgonine) Not Detected NDET^Not Detected ng/mL      Comment:      Cutoff for a negative cocaine is 150 ng/ml or less.    Methamphetamine (d-Methamphetamine) Not Detected NDET^Not Detected ng/mL      Comment:      Cutoff for a negative methamphetamine is 500 ng/ml or less.    Opiates (Morphine) Not Detected NDET^Not Detected ng/mL      Comment:      Cutoff for a negative opiate is 100 ng/ml or less.    Amphetamine (d-Amphetamine) Not Detected NDET^Not Detected ng/mL      Comment:      Cutoff for a negative amphetamine is 500 ng/mL or less.    Benzodiazepines (Nordiazepam) Not Detected NDET^Not Detected ng/mL      Comment:      Cutoff for a negative benzodiazepine is 150 ng/ml or less.    Tricyclic Antidepressants (Desipramine) Not Detected NDET^Not Detected ng/mL      Comment:      Cutoff for a negative tricyclic antidepressant is 300 ng/ml or less.    Methadone (Methadone) Not Detected NDET^Not Detected ng/mL      Comment:      Cutoff for a negative methadone is 200 ng/ml or less.    Barbiturates (Butalbital) Not Detected NDET^Not Detected ng/mL      Comment:      Cutoff for a negative barbituate is 200 ng/ml or less.    Oxycodone (Oxycodone) Detected, Abnormal Result (A) NDET^Not Detected ng/mL      Comment:      Cutoff for a positive oxycodone is greater than 100 ng/ml.  This is an unconfirmed screening result to be  used for medical purposes only.   Order [id number] for confirmation or individual confirmation tests to [employer].      Propoxyphene (Norpropoxyphene) Not Detected NDET^Not Detected ng/mL      Comment:      Cutoff for a negative propoxyphene is 300 ng/ml or less    Buprenorphine (Buprenorphine) Not Detected NDET^Not Detected ng/mL      Comment:      Cutoff for a negative buprenorphine is 10 ng/ml or less       If you have any questions or concerns, please call the clinic at the number listed above.     Sincerely,      Lisa Merino, NP/nr

## 2019-08-07 NOTE — PATIENT INSTRUCTIONS

## 2019-08-07 NOTE — PROGRESS NOTES
Lung Cancer Screening Shared Decision Making Visit     Shania Holliday is eligible for lung cancer screening on the basis of the information provided in my signed lung cancer screening order.     I have discussed with patient the risks and benefits of screening for lung cancer with low-dose CT.     The risks include:  radiation exposure: one low dose chest CT has as much ionizing radiation as about 15 chest x-rays or 6 months of background radiation living in [place]    false positives: 96% of positive findings/nodules are NOT cancer, but some might still require additional diagnostic evaluation, including biopsy  over-diagnosis: some slow growing cancers that might never have been clinically significant will be detected and treated unnecessarily     The benefit of early detection of lung cancer is contingent upon adherence to annual screening or more frequent follow up if indicated.     Furthermore, reaping the benefits of screening requires Shania Holliday to be willing and physically able to undergo diagnostic procedures, if indicated. Although no specific guide is available for determining severity of comorbidities, it is reasonable to withhold screening in patients who have greater mortality risk from other diseases.     We did discuss that the only way to prevent lung cancer is to not smoke. Smoking cessation assistance was offered.    I did offer risk estimation using a calculator such as this one:    ShouldIScreen

## 2019-08-08 LAB
ALBUMIN SERPL-MCNC: 4.3 G/DL (ref 3.4–5)
ALP SERPL-CCNC: 77 U/L (ref 40–150)
ALT SERPL W P-5'-P-CCNC: 23 U/L (ref 0–50)
ANION GAP SERPL CALCULATED.3IONS-SCNC: 8 MMOL/L (ref 3–14)
AST SERPL W P-5'-P-CCNC: 18 U/L (ref 0–45)
BILIRUB SERPL-MCNC: 0.5 MG/DL (ref 0.2–1.3)
BUN SERPL-MCNC: 8 MG/DL (ref 7–30)
CALCIUM SERPL-MCNC: 9 MG/DL (ref 8.5–10.1)
CHLORIDE SERPL-SCNC: 105 MMOL/L (ref 94–109)
CHOLEST SERPL-MCNC: 205 MG/DL
CO2 SERPL-SCNC: 24 MMOL/L (ref 20–32)
CREAT SERPL-MCNC: 0.82 MG/DL (ref 0.52–1.04)
GFR SERPL CREATININE-BSD FRML MDRD: 74 ML/MIN/{1.73_M2}
GLUCOSE SERPL-MCNC: 101 MG/DL (ref 70–99)
HDLC SERPL-MCNC: 69 MG/DL
LDLC SERPL CALC-MCNC: 114 MG/DL
NONHDLC SERPL-MCNC: 136 MG/DL
POTASSIUM SERPL-SCNC: 4.2 MMOL/L (ref 3.4–5.3)
PROT SERPL-MCNC: 7.4 G/DL (ref 6.8–8.8)
SODIUM SERPL-SCNC: 137 MMOL/L (ref 133–144)
TRIGL SERPL-MCNC: 111 MG/DL

## 2019-08-08 ASSESSMENT — ANXIETY QUESTIONNAIRES: GAD7 TOTAL SCORE: 0

## 2019-09-19 ENCOUNTER — TELEPHONE (OUTPATIENT)
Dept: FAMILY MEDICINE | Facility: CLINIC | Age: 69
End: 2019-09-19

## 2019-09-19 DIAGNOSIS — G89.4 CHRONIC PAIN SYNDROME: ICD-10-CM

## 2019-09-19 RX ORDER — OXYCODONE HYDROCHLORIDE 5 MG/1
TABLET ORAL
Qty: 140 TABLET | Refills: 0 | Status: SHIPPED | OUTPATIENT
Start: 2019-09-20 | End: 2019-09-19

## 2019-09-19 RX ORDER — OXYCODONE HYDROCHLORIDE 5 MG/1
TABLET ORAL
Qty: 140 TABLET | Refills: 0 | Status: SHIPPED | OUTPATIENT
Start: 2019-10-18 | End: 2019-11-14

## 2019-09-19 NOTE — TELEPHONE ENCOUNTER
LVM that Marisa sent 2 scripts for oxyCodone 5mg to her pharmacy. Scripts are date 9/20/19 and 10/18/19. She is to call back if she has any questions.Arelis Edwards, NA/R

## 2019-09-19 NOTE — TELEPHONE ENCOUNTER
Reason for Call:  Medication or medication refill:    Do you use a [place] [facility]?  Name of the pharmacy and phone number for the current request:  [facility]    Name of the medication requested: oxyCODONE (ROXICODONE) 5 MG tablet    Other request: Pt calling for refill of pain medication    Can we leave a detailed message on this number? YES    Phone number patient can be reached at: Home number on file [phone] (home)    Best Time: Any time    Call taken on 9/19/2019 at 11:38 AM by Edelmira Siddiqui

## 2019-10-22 DIAGNOSIS — G89.4 CHRONIC PAIN SYNDROME: ICD-10-CM

## 2019-10-22 DIAGNOSIS — Z72.0 TOBACCO ABUSE: ICD-10-CM

## 2019-10-23 RX ORDER — OXYCODONE HYDROCHLORIDE 5 MG/1
TABLET ORAL
Qty: 140 TABLET | Refills: 0 | Status: SHIPPED | OUTPATIENT
Start: 2019-10-23 | End: 2020-04-08

## 2019-10-23 RX ORDER — OXYCODONE HYDROCHLORIDE 5 MG/1
TABLET ORAL
Qty: 140 TABLET | Refills: 0 | Status: CANCELLED | OUTPATIENT
Start: 2019-10-23

## 2019-10-23 RX ORDER — ALBUTEROL SULFATE 90 UG/1
AEROSOL, METERED RESPIRATORY (INHALATION)
Qty: 8.5 G | Status: SHIPPED | OUTPATIENT
Start: 2019-10-23 | End: 2020-09-01

## 2019-10-23 NOTE — TELEPHONE ENCOUNTER
Requested Prescriptions   Pending Prescriptions Disp Refills     oxyCODONE (ROXICODONE) 5 MG tablet [Pharmacy Med Name: oxyCODONE HCl Oral Tablet 5 MG] 140 tablet 0     Sig: Take 1-2 tablets by mouth. Every 4-6 hours as needed for pain  Must last 28 days       There is no refill protocol information for this order              Last Written Prescription Date:  10/18/19  Last Fill Quantity: 140,   # refills: 0  Last Office Visit: 8/7/19  Future Office visit:    Next 5 appointments (look out 90 days)    Nov 14, 2019 11:30 AM CST  Office Visit with [facility] NP  [facility] [address] [place] [id number] [phone]           Routing refill request to provider for review/approval because:  Drug not on the [facility], [facility] or [facility] refill protocol or controlled substance    Appears to be duplicate request - provider to decline controlled substances please

## 2019-10-23 NOTE — TELEPHONE ENCOUNTER
David Cunningham  Pt didn't fill her 10/18 rx for oxy that was written on 9/19/19 d/t pt ill  She would also like a refill of her albuterol.  Please sign both.     Thanks!     Daysi Cintron RN

## 2019-10-23 NOTE — TELEPHONE ENCOUNTER
Reason for call:  Medication   If this is a refill request, has the caller requested the refill from the pharmacy already? Yes  Will the patient be using a [facility]? No  Name of the pharmacy and phone number for the current request: [id number] - [place], [place] - [address] [facility]     Name of the medication requested: oxyCODONE (ROXICODONE) 5 MG tablet    Other request: pt was sick and couldn't pick it up so needs a new refill of the med    Phone number to reach patient:  Home number on file [phone] (home)    Best Time:  any    Can we leave a detailed message on this number?  YES

## 2019-10-24 NOTE — TELEPHONE ENCOUNTER
HP Reception - can you update patient please - looks like this was e-prescribed so patient doesn't have to  a hard script

## 2019-11-14 ENCOUNTER — OFFICE VISIT (OUTPATIENT)
Dept: FAMILY MEDICINE | Facility: CLINIC | Age: 69
End: 2019-11-14
Payer: COMMERCIAL

## 2019-11-14 VITALS
OXYGEN SATURATION: 97 % | TEMPERATURE: 98.1 F | WEIGHT: 153 LBS | HEART RATE: 86 BPM | DIASTOLIC BLOOD PRESSURE: 72 MMHG | BODY MASS INDEX: 28.67 KG/M2 | RESPIRATION RATE: 18 BRPM | SYSTOLIC BLOOD PRESSURE: 120 MMHG

## 2019-11-14 DIAGNOSIS — Z23 NEED FOR PROPHYLACTIC VACCINATION AND INOCULATION AGAINST INFLUENZA: ICD-10-CM

## 2019-11-14 DIAGNOSIS — G89.4 CHRONIC PAIN SYNDROME: Primary | ICD-10-CM

## 2019-11-14 DIAGNOSIS — J06.9 UPPER RESPIRATORY TRACT INFECTION, UNSPECIFIED TYPE: ICD-10-CM

## 2019-11-14 PROCEDURE — 99214 OFFICE O/P EST MOD 30 MIN: CPT | Mod: 25 | Performed by: NURSE PRACTITIONER

## 2019-11-14 PROCEDURE — 90662 IIV NO PRSV INCREASED AG IM: CPT | Performed by: NURSE PRACTITIONER

## 2019-11-14 PROCEDURE — G0008 ADMIN INFLUENZA VIRUS VAC: HCPCS | Performed by: NURSE PRACTITIONER

## 2019-11-14 RX ORDER — OXYCODONE HYDROCHLORIDE 5 MG/1
TABLET ORAL
Qty: 140 TABLET | Refills: 0 | Status: SHIPPED | OUTPATIENT
Start: 2019-11-20 | End: 2019-12-17

## 2019-11-14 NOTE — PROGRESS NOTES
Subjective     Shania Holliday is a 69 year old female who presents to clinic today for the following health issues:    HPI   Chronic Pain Follow-Up       Type / Location of Pain: chronic pain, cervical, neck pain, left knee, right ankle   Analgesia/pain control:       Recent changes:  same      Overall control: Tolerable with discomfort  Activity level/function:      Daily activities:  Able to do light housework, cooking    Work:  Unable to work  Adverse effects:  No  Adherance    Taking medication as directed?  Yes    Participating in other treatments: No, massage occasional   Risk Factors:    Sleep:  Fair    Mood/anxiety:  controlled    Recent family or social stressors:  none noted. Flu symptoms     Other aggravating factors: none  PHQ-9 SCORE 7/19/2018 2/6/2019 8/7/2019   PHQ-9 Total Score - - -   PHQ-9 Total Score MyChart - - 2 (Minimal depression)   PHQ-9 Total Score 0 0 2     NASRA-7 SCORE 11/14/2016 1/31/2018 8/7/2019   Total Score - - -   Total Score - - 0 (minimal anxiety)   Total Score 0 0 0     Encounter-Level CSA - 08/04/2015:    Controlled Substance Agreement - Scan on 8/6/2015  2:50 PM: CONTROLLED SUBSTANCE AGREEMENT     Patient-Level CSA:    There are no patient-level csa.         How many servings of fruits and vegetables do you eat daily?  2-3    On average, how many sweetened beverages do you drink each day (soda, juice, sweet tea, etc)? Coffee     How many days per week do you miss taking your medication? 0    Her pain is stable on her current dose of 5 oxycodone daily.    She has had a cold for the past couple of weeks, symptoms are improving.  Some nasal congestion and cough.  No fevers.          Reviewed and updated as needed this visit by Provider         Review of Systems   ROS COMP: CONSTITUTIONAL: NEGATIVE for fever, chills, change in weight  ENT/MOUTH: see HPI  RESP:see HPI  CV: NEGATIVE for chest pain, palpitations or peripheral edema  MUSCULOSKELETAL: see HPI  NEURO: NEGATIVE for weakness,  "dizziness or paresthesias      Objective    Resp 18   Wt 69.4 kg (153 lb)   BMI 28.67 kg/m    Body mass index is 28.67 kg/m .  Physical Exam   GENERAL: healthy, alert and no distress  HENT: ear canals and TM's normal, nose and mouth without ulcers or lesions  NECK: no adenopathy, no asymmetry, masses, or scars and thyroid normal to palpation  RESP: lungs clear to auscultation - no rales, rhonchi or wheezes  CV: regular rate and rhythm, normal S1 S2, no S3 or S4, no murmur, click or rub, no peripheral edema and peripheral pulses strong  PSYCH: mentation appears normal, affect normal/bright            Assessment & Plan     1. Chronic pain syndrome  Patient Instructions    prescription on 11/20    Call for refills on 12/17 for refills dated on 12/18 and 1/15   stable   checked.   - oxyCODONE (ROXICODONE) 5 MG tablet; Take 1-2 tablets by mouth every 4-6 hours as needed for pain  Must last 28 days  Dispense: 140 tablet; Refill: 0    2. Upper respiratory tract infection, unspecified type  Discussed symptomatic treatment measures.  Return to clinic if symptoms persist or worsen.     3. Need for prophylactic vaccination and inoculation against influenza    - INFLUENZA (HIGH DOSE) 3 VALENT VACCINE [67842]  - Vaccine Administration, Initial [01542]     Tobacco Cessation:   reports that she has been smoking cigarettes. She has a 19.00 pack-year smoking history. She has never used smokeless tobacco.  Tobacco Cessation Action Plan: Information offered: Patient not interested at this time      BMI:   Estimated body mass index is 28.67 kg/m  as calculated from the following:    Height as of 8/7/19: 1.556 m (5' 1.25\").    Weight as of this encounter: 69.4 kg (153 lb).               Return in about 3 months (around 2/14/2020).    Lisa Merino NP  [facility]        "

## 2019-12-17 ENCOUNTER — TELEPHONE (OUTPATIENT)
Dept: FAMILY MEDICINE | Facility: CLINIC | Age: 69
End: 2019-12-17

## 2019-12-17 DIAGNOSIS — G89.4 CHRONIC PAIN SYNDROME: ICD-10-CM

## 2019-12-17 RX ORDER — OXYCODONE HYDROCHLORIDE 5 MG/1
TABLET ORAL
Qty: 140 TABLET | Refills: 0 | Status: SHIPPED | OUTPATIENT
Start: 2019-12-18 | End: 2019-12-17

## 2019-12-17 RX ORDER — OXYCODONE HYDROCHLORIDE 5 MG/1
TABLET ORAL
Qty: 140 TABLET | Refills: 0 | Status: SHIPPED | OUTPATIENT
Start: 2020-01-15 | End: 2020-02-13

## 2019-12-17 NOTE — TELEPHONE ENCOUNTER
Marisa Merino DNP CNP,  Routing refill request to provider for review/approval because:  Drug not on the [facility] refill protocol     : Unable to check , writer has not been approved by provider as delegate    Per 11/14/2019 office visit note:   Call for refills on 12/17 for refills dated on 12/18 and 1/15   stable   checked.   - oxyCODONE (ROXICODONE) 5 MG tablet; Take 1-2 tablets by mouth every 4-6 hours as needed for pain  Must last 28 days  Dispense: 140 tablet; Refill: 0    Thank You!  Maribel Campuzano, RN  Triage Nurse

## 2020-02-13 ENCOUNTER — OFFICE VISIT (OUTPATIENT)
Dept: FAMILY MEDICINE | Facility: CLINIC | Age: 70
End: 2020-02-13
Payer: COMMERCIAL

## 2020-02-13 VITALS
TEMPERATURE: 98.6 F | HEIGHT: 61 IN | OXYGEN SATURATION: 95 % | RESPIRATION RATE: 16 BRPM | DIASTOLIC BLOOD PRESSURE: 68 MMHG | WEIGHT: 155 LBS | BODY MASS INDEX: 29.27 KG/M2 | HEART RATE: 75 BPM | SYSTOLIC BLOOD PRESSURE: 110 MMHG

## 2020-02-13 DIAGNOSIS — F33.42 RECURRENT MAJOR DEPRESSIVE DISORDER, IN FULL REMISSION (H): ICD-10-CM

## 2020-02-13 DIAGNOSIS — G89.4 CHRONIC PAIN SYNDROME: ICD-10-CM

## 2020-02-13 DIAGNOSIS — J43.9 PULMONARY EMPHYSEMA, UNSPECIFIED EMPHYSEMA TYPE (H): ICD-10-CM

## 2020-02-13 DIAGNOSIS — E78.5 HYPERLIPIDEMIA LDL GOAL <130: ICD-10-CM

## 2020-02-13 DIAGNOSIS — F11.20 CONTINUOUS OPIOID DEPENDENCE (H): ICD-10-CM

## 2020-02-13 PROCEDURE — 99214 OFFICE O/P EST MOD 30 MIN: CPT | Performed by: NURSE PRACTITIONER

## 2020-02-13 RX ORDER — OXYCODONE HYDROCHLORIDE 5 MG/1
TABLET ORAL
Qty: 140 TABLET | Refills: 0 | Status: SHIPPED | OUTPATIENT
Start: 2020-03-12 | End: 2020-04-08

## 2020-02-13 RX ORDER — OXYCODONE HYDROCHLORIDE 5 MG/1
TABLET ORAL
Qty: 140 TABLET | Refills: 0 | Status: SHIPPED | OUTPATIENT
Start: 2020-02-13 | End: 2020-02-13

## 2020-02-13 RX ORDER — METHOCARBAMOL 750 MG/1
750 TABLET, FILM COATED ORAL EVERY 6 HOURS PRN
Qty: 180 TABLET | Status: SHIPPED | OUTPATIENT
Start: 2020-02-13 | End: 2021-03-16

## 2020-02-13 RX ORDER — SIMVASTATIN 20 MG
TABLET ORAL
Qty: 90 TABLET | Refills: 3 | Status: SHIPPED | OUTPATIENT
Start: 2020-02-13 | End: 2020-09-01

## 2020-02-13 ASSESSMENT — ANXIETY QUESTIONNAIRES
3. WORRYING TOO MUCH ABOUT DIFFERENT THINGS: NOT AT ALL
6. BECOMING EASILY ANNOYED OR IRRITABLE: NOT AT ALL
IF YOU CHECKED OFF ANY PROBLEMS ON THIS QUESTIONNAIRE, HOW DIFFICULT HAVE THESE PROBLEMS MADE IT FOR YOU TO DO YOUR WORK, TAKE CARE OF THINGS AT HOME, OR GET ALONG WITH OTHER PEOPLE: NOT DIFFICULT AT ALL
7. FEELING AFRAID AS IF SOMETHING AWFUL MIGHT HAPPEN: NOT AT ALL
1. FEELING NERVOUS, ANXIOUS, OR ON EDGE: NOT AT ALL
GAD7 TOTAL SCORE: 0
2. NOT BEING ABLE TO STOP OR CONTROL WORRYING: NOT AT ALL
5. BEING SO RESTLESS THAT IT IS HARD TO SIT STILL: NOT AT ALL

## 2020-02-13 ASSESSMENT — MIFFLIN-ST. JEOR: SCORE: 1170.46

## 2020-02-13 ASSESSMENT — PATIENT HEALTH QUESTIONNAIRE - PHQ9
SUM OF ALL RESPONSES TO PHQ QUESTIONS 1-9: 2
5. POOR APPETITE OR OVEREATING: NOT AT ALL

## 2020-02-13 NOTE — PROGRESS NOTES
Subjective     Shania Holliday is a 69 year old female who presents to clinic today for the following health issues:    HPI   Chronic Pain Follow-Up       Type / Location of Pain: chronic pain, cervical, neck pain, left knee, right ankle   Analgesia/pain control:       Recent changes:  same      Overall control: Tolerable with discomfort  Activity level/function:      Daily activities:  Able to do light housework, cooking    Work:  Unable to work  Adverse effects:  No  Adherance    Taking medication as directed?  Yes    Participating in other treatments: No, massage occasional   Risk Factors:    Sleep:  Fair    Mood/anxiety:  controlled    Recent family or social stressors:  none noted. Flu symptoms     Other aggravating factors: none  PHQ-9 SCORE 2/6/2019 8/7/2019 2/13/2020   PHQ-9 Total Score - - -   PHQ-9 Total Score MyChart - 2 (Minimal depression) -   PHQ-9 Total Score 0 2 2     NASRA-7 SCORE 1/31/2018 8/7/2019 2/13/2020   Total Score - - -   Total Score - 0 (minimal anxiety) -   Total Score 0 0 0     Encounter-Level CSA - 08/04/2015:    Controlled Substance Agreement - Scan on 8/6/2015  2:50 PM: CONTROLLED SUBSTANCE AGREEMENT     Patient-Level CSA:    There are no patient-level csa.         How many servings of fruits and vegetables do you eat daily?  2-3    On average, how many sweetened beverages do you drink each day (soda, juice, sweet tea, etc)? Coffee     How many days per week do you miss taking your medication? 0    Her pain is stable on her current dose of 5 oxycodone daily.    She is still smoking.  Denies any shortness of breath.    She denies any current symptoms of depression.  She is doing well on Paxil.       Reviewed and updated as needed this visit by Provider         Review of Systems   ROS COMP: CONSTITUTIONAL: NEGATIVE for fever, chills, change in weight  ENT/MOUTH: see HPI  RESP:see HPI  CV: NEGATIVE for chest pain, palpitations or peripheral edema  MUSCULOSKELETAL: see HPI  NEURO: NEGATIVE for  "weakness, dizziness or paresthesias      Objective    /68 (BP Location: Left arm, Patient Position: Sitting, Cuff Size: Adult Regular)   Pulse 75   Temp 98.6  F (37  C) (Oral)   Resp 16   Ht 1.549 m (5' 1\")   Wt 70.3 kg (155 lb)   SpO2 95%   BMI 29.29 kg/m    Body mass index is 29.29 kg/m .  Physical Exam   GENERAL: healthy, alert and no distress  HENT: ear canals and TM's normal, nose and mouth without ulcers or lesions  NECK: no adenopathy, no asymmetry, masses, or scars and thyroid normal to palpation  RESP: lungs clear to auscultation - no rales, rhonchi or wheezes  CV: regular rate and rhythm, normal S1 S2, no S3 or S4, no murmur, click or rub, no peripheral edema and peripheral pulses strong  PSYCH: mentation appears normal, affect normal/bright            Assessment & Plan   (G89.4) Chronic pain syndrome  Comment:   Plan: methocarbamol (ROBAXIN) 750 MG tablet,         oxyCODONE (ROXICODONE) 5 MG tablet,         DISCONTINUED: oxyCODONE (ROXICODONE) 5 MG         tablet         checked.   Refills given.   Patient Instructions   Call on 4/8 for refill dated 4/9.  Next appointment 5/6 or 5/7         (F11.20) Continuous opioid dependence (H)  Comment:   Plan: stable.    (F33.42) Recurrent major depressive disorder, in full remission (H)  Comment: in remission  Plan: The current medical regimen is effective;  continue present plan and medications.     (J43.9) Pulmonary emphysema, unspecified emphysema type (H)  Comment: stable  Plan: Encouraged smoking cessation    (E78.5) Hyperlipidemia LDL goal <130  Comment:   Plan: simvastatin (ZOCOR) 20 MG tablet        The current medical regimen is effective;  continue present plan and medications.             Tobacco Cessation:   reports that she has been smoking cigarettes. She has a 19.00 pack-year smoking history. She has never used smokeless tobacco.  Tobacco Cessation Action Plan: Information offered: Patient not interested at this time      BMI: " "  Estimated body mass index is 28.67 kg/m  as calculated from the following:    Height as of 8/7/19: 1.556 m (5' 1.25\").    Weight as of this encounter: 69.4 kg (153 lb).               No follow-ups on file.    Lisa Merino NP  [facility]        "

## 2020-02-14 ASSESSMENT — ANXIETY QUESTIONNAIRES: GAD7 TOTAL SCORE: 0

## 2020-04-08 ENCOUNTER — TELEPHONE (OUTPATIENT)
Dept: FAMILY MEDICINE | Facility: CLINIC | Age: 70
End: 2020-04-08

## 2020-04-08 DIAGNOSIS — G89.4 CHRONIC PAIN SYNDROME: ICD-10-CM

## 2020-04-08 RX ORDER — OXYCODONE HYDROCHLORIDE 5 MG/1
TABLET ORAL
Qty: 140 TABLET | Refills: 0 | Status: SHIPPED | OUTPATIENT
Start: 2020-05-07 | End: 2020-05-06

## 2020-04-08 RX ORDER — OXYCODONE HYDROCHLORIDE 5 MG/1
TABLET ORAL
Qty: 140 TABLET | Refills: 0 | Status: SHIPPED | OUTPATIENT
Start: 2020-04-09 | End: 2020-04-08

## 2020-04-08 NOTE — TELEPHONE ENCOUNTER
Reason for Call:  Medication or medication refill:    Do you use a [facility]?  Name of the pharmacy and phone number for the current request:  [facility]    Name of the medication requested: oxyCODONE    Other request: None    Can we leave a detailed message on this number? YES    Phone number patient can be reached at: Home number on file [phone] (home)    Best Time: ASAP    Call taken on 4/8/2020 at 2:28 PM by Nena Parkinson

## 2020-05-06 ENCOUNTER — VIRTUAL VISIT (OUTPATIENT)
Dept: FAMILY MEDICINE | Facility: CLINIC | Age: 70
End: 2020-05-06
Payer: COMMERCIAL

## 2020-05-06 VITALS
OXYGEN SATURATION: 98 % | DIASTOLIC BLOOD PRESSURE: 75 MMHG | WEIGHT: 155 LBS | TEMPERATURE: 96.1 F | BODY MASS INDEX: 29.29 KG/M2 | SYSTOLIC BLOOD PRESSURE: 112 MMHG | HEART RATE: 55 BPM

## 2020-05-06 DIAGNOSIS — J43.9 PULMONARY EMPHYSEMA, UNSPECIFIED EMPHYSEMA TYPE (H): ICD-10-CM

## 2020-05-06 DIAGNOSIS — F33.42 RECURRENT MAJOR DEPRESSIVE DISORDER, IN FULL REMISSION (H): ICD-10-CM

## 2020-05-06 DIAGNOSIS — G89.4 CHRONIC PAIN SYNDROME: Primary | ICD-10-CM

## 2020-05-06 PROCEDURE — 99214 OFFICE O/P EST MOD 30 MIN: CPT | Mod: 95 | Performed by: NURSE PRACTITIONER

## 2020-05-06 RX ORDER — OXYCODONE HYDROCHLORIDE 5 MG/1
TABLET ORAL
Qty: 150 TABLET | Refills: 0 | Status: SHIPPED | OUTPATIENT
Start: 2020-06-04 | End: 2020-05-06

## 2020-05-06 RX ORDER — OXYCODONE HYDROCHLORIDE 5 MG/1
TABLET ORAL
Qty: 140 TABLET | Refills: 0 | Status: SHIPPED | OUTPATIENT
Start: 2020-08-05 | End: 2020-05-06

## 2020-05-06 RX ORDER — OXYCODONE HYDROCHLORIDE 5 MG/1
TABLET ORAL
Qty: 150 TABLET | Refills: 0 | Status: SHIPPED | OUTPATIENT
Start: 2020-08-03 | End: 2020-09-01

## 2020-05-06 RX ORDER — OXYCODONE HYDROCHLORIDE 5 MG/1
TABLET ORAL
Qty: 150 TABLET | Refills: 0 | Status: SHIPPED | OUTPATIENT
Start: 2020-07-03 | End: 2020-05-06

## 2020-05-06 RX ORDER — OXYCODONE HYDROCHLORIDE 5 MG/1
TABLET ORAL
Qty: 140 TABLET | Refills: 0 | Status: SHIPPED | OUTPATIENT
Start: 2020-06-06 | End: 2020-05-06

## 2020-05-06 RX ORDER — OXYCODONE HYDROCHLORIDE 5 MG/1
TABLET ORAL
Qty: 140 TABLET | Refills: 0 | Status: SHIPPED | OUTPATIENT
Start: 2020-07-06 | End: 2020-05-06

## 2020-05-06 NOTE — PROGRESS NOTES
"Shania Holliday is a 69 year old female who is being evaluated via a billable telephone visit.      The patient has been notified of following:     \"This telephone visit will be conducted via a call between you and your physician/provider. We have found that certain health care needs can be provided without the need for a physical exam.  This service lets us provide the care you need with a short phone conversation.  If a prescription is necessary we can send it directly to your pharmacy.  If lab work is needed we can place an order for that and you can then stop by our lab to have the test done at a later time.    Telephone visits are billed at different rates depending on your insurance coverage. During this emergency period, for some insurers they may be billed the same as an in-person visit.  Please reach out to your insurance provider with any questions.    If during the course of the call the physician/provider feels a telephone visit is not appropriate, you will not be charged for this service.\"    Patient has given verbal consent for Telephone visit?  Yes    What phone number would you like to be contacted at? [phone]    How would you like to obtain your AVS? Mail a copy    Subjective     Shania Holliday is a 69 year old female who presents to clinic today for the following health issues:    HPI     Chronic Pain Follow-Up       Type / Location of Pain: chronic pain, cervical, neck pain, left knee, right ankle, jaw   Analgesia/pain control:       Recent changes:  same      Overall control: Tolerable with discomfort  Activity level/function:      Daily activities:  Able to do light housework, cooking    Work:  Unable to work  Adverse effects:  No  Adherance    Taking medication as directed?  Yes    Participating in other treatments: No, massage occasional   Risk Factors:    Sleep:  Good    Mood/anxiety:  good    Recent family or social stressors:  none noted.      Other aggravating factors: none    PHQ-9 SCORE " 2/6/2019 8/7/2019 2/13/2020   PHQ-9 Total Score - - -   PHQ-9 Total Score MyChart - 2 (Minimal depression) -   PHQ-9 Total Score 0 2 2     NASRA-7 SCORE 1/31/2018 8/7/2019 2/13/2020   Total Score - - -   Total Score - 0 (minimal anxiety) -   Total Score 0 0 0     No flowsheet data found.  Encounter-Level CSA - 08/04/2015:    Controlled Substance Agreement - Scan on 8/6/2015  2:50 PM: CONTROLLED SUBSTANCE AGREEMENT     Patient-Level CSA:    There are no patient-level csa.         How many servings of fruits and vegetables do you eat daily?  2-3    On average, how many sweetened beverages do you drink each day (Examples: soda, juice, sweet tea, etc.  Do NOT count diet or artificially sweetened beverages)?   0    How many days per week do you exercise enough to make your heart beat faster? 7    How many minutes a day do you exercise enough to make your heart beat faster? 10 - 19    How many days per week do you miss taking your medication? 0       Her pain is stable on her current dose of 5 oxycodone daily.    No changes in breathing.  She is still smoking.   She denies any cough or shortness of breath.    Her mood is good on Paxil.  She denies any symptoms of depression.         Reviewed and updated as needed this visit by Provider         Review of Systems   ROS COMP: CONSTITUTIONAL: NEGATIVE for fever, chills, change in weight  ENT/MOUTH: NEGATIVE for ear, mouth and throat problems  RESP: NEGATIVE for significant cough or SOB  CV: NEGATIVE for chest pain, palpitations or peripheral edema  GI: NEGATIVE for nausea, abdominal pain, heartburn, or change in bowel habits  MUSCULOSKELETAL: see HPI  NEURO: NEGATIVE for weakness, dizziness or paresthesias  PSYCHIATRIC: NEGATIVE for changes in mood or affect       Objective   Reported vitals:  There were no vitals taken for this visit.   healthy, alert and no distress  PSYCH: Alert and oriented times 3; coherent speech, normal   rate and volume, able to articulate logical  thoughts, able   to abstract reason, no tangential thoughts, no hallucinations   or delusions  Her affect is normal  RESP: No cough, no audible wheezing, able to talk in full sentences  Remainder of exam unable to be completed due to telephone visits            Assessment/Plan:  1. Chronic pain syndrome  Stable  Monitoring of high-risk  medication  The current medical regimen is effective;  continue present plan and medications.    checked.  Refills sent in for the next 3 months (dated 6/4, 7/3, 8/3) and she  Will  follow up in clinic in September.   - oxyCODONE (ROXICODONE) 5 MG tablet; Take 1-2 tablets by mouth every 4-6 hours as needed for pain  Must last 30 days  Dispense: 150 tablet; Refill: 0    2. Pulmonary emphysema, unspecified emphysema type (H)  Stable  Encouraged smoking cessation.     3. Recurrent major depressive disorder, in full remission (H)  In complete remission  The current medical regimen is effective;  continue present plan and medications.       No follow-ups on file.      Phone call duration:  15 minutes    Lisa Merino NP

## 2020-05-06 NOTE — LETTER
[facility] [address] [place] [address]  [phone]        May 6, 2020    Shania Holliday                                                                                                                     [facility] [place] [address]            Dear Shania,    See attached    Sincerely,         Lisa Merino CNP

## 2020-06-04 DIAGNOSIS — F41.9 ANXIETY: ICD-10-CM

## 2020-06-05 RX ORDER — HYDROXYZINE HYDROCHLORIDE 25 MG/1
TABLET, FILM COATED ORAL
Qty: 180 TABLET | Status: SHIPPED | OUTPATIENT
Start: 2020-06-05 | End: 2022-01-24

## 2020-08-08 DIAGNOSIS — F33.42 MAJOR DEPRESSIVE DISORDER, RECURRENT, IN FULL REMISSION (H): ICD-10-CM

## 2020-08-08 RX ORDER — PAROXETINE 20 MG/1
10 TABLET, FILM COATED ORAL DAILY
Qty: 45 TABLET | Status: CANCELLED | OUTPATIENT
Start: 2020-08-08

## 2020-08-12 ENCOUNTER — VIRTUAL VISIT (OUTPATIENT)
Dept: FAMILY MEDICINE | Facility: CLINIC | Age: 70
End: 2020-08-12
Payer: COMMERCIAL

## 2020-08-12 DIAGNOSIS — F33.42 MAJOR DEPRESSIVE DISORDER, RECURRENT, IN FULL REMISSION (H): ICD-10-CM

## 2020-08-12 PROCEDURE — 96127 BRIEF EMOTIONAL/BEHAV ASSMT: CPT | Performed by: NURSE PRACTITIONER

## 2020-08-12 PROCEDURE — 99213 OFFICE O/P EST LOW 20 MIN: CPT | Mod: 95 | Performed by: NURSE PRACTITIONER

## 2020-08-12 RX ORDER — PAROXETINE 20 MG/1
10 TABLET, FILM COATED ORAL DAILY
Qty: 45 TABLET | Refills: 3 | Status: SHIPPED | OUTPATIENT
Start: 2020-08-12 | End: 2020-09-01

## 2020-08-12 ASSESSMENT — PATIENT HEALTH QUESTIONNAIRE - PHQ9
5. POOR APPETITE OR OVEREATING: NOT AT ALL
SUM OF ALL RESPONSES TO PHQ QUESTIONS 1-9: 1

## 2020-08-12 ASSESSMENT — ANXIETY QUESTIONNAIRES
3. WORRYING TOO MUCH ABOUT DIFFERENT THINGS: NOT AT ALL
5. BEING SO RESTLESS THAT IT IS HARD TO SIT STILL: NOT AT ALL
6. BECOMING EASILY ANNOYED OR IRRITABLE: NOT AT ALL
IF YOU CHECKED OFF ANY PROBLEMS ON THIS QUESTIONNAIRE, HOW DIFFICULT HAVE THESE PROBLEMS MADE IT FOR YOU TO DO YOUR WORK, TAKE CARE OF THINGS AT HOME, OR GET ALONG WITH OTHER PEOPLE: NOT DIFFICULT AT ALL
7. FEELING AFRAID AS IF SOMETHING AWFUL MIGHT HAPPEN: NOT AT ALL
2. NOT BEING ABLE TO STOP OR CONTROL WORRYING: NOT AT ALL
GAD7 TOTAL SCORE: 0
1. FEELING NERVOUS, ANXIOUS, OR ON EDGE: NOT AT ALL

## 2020-08-12 NOTE — PROGRESS NOTES
"Shania Holliday is a 70 year old female who is being evaluated via a billable telephone visit.      The patient has been notified of following:     \"This telephone visit will be conducted via a call between you and your physician/provider. We have found that certain health care needs can be provided without the need for a physical exam.  This service lets us provide the care you need with a short phone conversation.  If a prescription is necessary we can send it directly to your pharmacy.  If lab work is needed we can place an order for that and you can then stop by our lab to have the test done at a later time.    Telephone visits are billed at different rates depending on your insurance coverage. During this emergency period, for some insurers they may be billed the same as an in-person visit.  Please reach out to your insurance provider with any questions.    If during the course of the call the physician/provider feels a telephone visit is not appropriate, you will not be charged for this service.\"    Patient has given verbal consent for Telephone visit?  Yes    What phone number would you like to be contacted at? [phone]    How would you like to obtain your AVS? Mail a copy    Subjective     Shania Holliday is a 70 year old female who presents via phone visit today for the following health issues:    HPI    Depression and Anxiety Follow-Up    How are you doing with your depression since your last visit? Improved     How are you doing with your anxiety since your last visit?  Improved     Are you having other symptoms that might be associated with depression or anxiety? No    Have you had a significant life event? No     Do you have any concerns with your use of alcohol or other drugs? No    Social History     Tobacco Use     Smoking status: Current Every Day Smoker     Packs/day: 0.50     Years: 38.00     Pack years: 19.00     Types: Cigarettes     Smokeless tobacco: Never Used     Tobacco comment: 4-5 per day.  "   Substance Use Topics     Alcohol use: No     Alcohol/week: 0.0 standard drinks     Drug use: No     PHQ 8/7/2019 2/13/2020 8/12/2020   PHQ-9 Total Score 2 2 1   Q9: Thoughts of better off dead/self-harm past 2 weeks Not at all Not at all Not at all     NASRA-7 SCORE 8/7/2019 2/13/2020 8/12/2020   Total Score - - -   Total Score 0 (minimal anxiety) - -   Total Score 0 0 0     Last PHQ-9 8/12/2020   1.  Little interest or pleasure in doing things 0   2.  Feeling down, depressed, or hopeless 0   3.  Trouble falling or staying asleep, or sleeping too much 0   4.  Feeling tired or having little energy 0   5.  Poor appetite or overeating 1   6.  Feeling bad about yourself 0   7.  Trouble concentrating 0   8.  Moving slowly or restless 0   Q9: Thoughts of better off dead/self-harm past 2 weeks 0   PHQ-9 Total Score 1   Difficulty at work, home, or with people Not difficult at all     NASRA-7  8/12/2020   1. Feeling nervous, anxious, or on edge 0   2. Not being able to stop or control worrying 0   3. Worrying too much about different things 0   4. Trouble relaxing 0   5. Being so restless that it is hard to sit still 0   6. Becoming easily annoyed or irritable 0   7. Feeling afraid, as if something awful might happen 0   NASRA-7 Total Score 0   If you checked any problems, how difficult have they made it for you to do your work, take care of things at home, or get along with other people? Not difficult at all       Suicide Assessment Five-step Evaluation and Treatment (SAFE-T)          Follows with PCP Lisa Merino, visit today for med check.  She needs a refill of her paxil, only has 1 tab left.      MMD/NASRA- she is on paxil 10mg daily.  Also on hydroxyzine 2 tabs before bed as needed for anxiety.  She feels paxil is helpful for her mood, has been on it quite some time.  She tolerates it without side effects.      Hx of chronic neck, jaw, left knee, right ankle pain on opioid therapy.  She is on oxycodone 5mg tabs #150/mo.  Last visit with PCP 2020 and has plans to follow up with her in sept for physical.        Patient Active Problem List   Diagnosis     CHRONIC NECK PAIN     Disorder of bone and cartilage     Temporomandibular joint disorder     Esophageal reflux     Backache     Headache     Hemangioma     NEUROPATHY     TREMOR      GENITAL HERPES      Major depression in full remission (H)     HYPERLIPIDEMIA LDL GOAL <130     SI (sacroiliac) joint dysfunction     Allergic rhinitis due to other allergic trigger, unspecified seasonality     Advanced directives, counseling/discussion     Chronic pain     Pulmonary emphysema, unspecified emphysema type (H)     Anxiety     Continuous opioid dependence (H)     Past Surgical History:   Procedure Laterality Date     COLONOSCOPY  12/15/2014     HC REMOVE TONSILS/ADENOIDS,<14 Y/O      T & A <13y.o.     SURGICAL HISTORY OF -       left knee surgery     SURGICAL HISTORY OF -       laparotomy     SURGICAL HISTORY OF -       tubal ligation     SURGICAL HISTORY OF -       sinus surgery     SURGICAL HISTORY OF -       oral surgery       Social History     Tobacco Use     Smoking status: Current Every Day Smoker     Packs/day: 0.50     Years: 38.00     Pack years: 19.00     Types: Cigarettes     Smokeless tobacco: Never Used     Tobacco comment: 4-5 per day.    Substance Use Topics     Alcohol use: No     Alcohol/week: 0.0 standard drinks     Family History   Problem Relation Age of Onset     Hypertension Mother      Cerebrovascular Disease Mother          from aneurysm     Arthritis Mother      Respiratory Mother         asthma     Eye Disorder Mother      Circulatory Mother         DVT     C.A.D. Father         MI in 50's     Cerebrovascular Disease Father         aneurysm x 3     Arthritis Father      Respiratory Father         asthma     Eye Disorder Father      Diabetes Maternal Uncle         type 2     Arthritis Sister         x2     Asthma Sister      Cerebrovascular Disease Sister          stroke      Dementia Sister      Anxiety Disorder Sister         Panic     Cancer Paternal Aunt         melanoma     Cancer Other         nephew melanoma     Respiratory Sister         asthma     Arthritis Sister      Eye Disorder Other         nephew macular degeneration     Cancer Son         Advanced Melanoma     Breast Cancer No family hx of      Cancer - colorectal No family hx of          Current Outpatient Medications   Medication Sig Dispense Refill     albuterol (PROAIR HFA) 108 (90 Base) MCG/ACT inhaler INHALE 2 PUFFS INTO THE LUNGS EVERY 6 HOURS AS NEEDED FOR SHORTNESS OF BREATH / DYSPNEA OR WHEEZING 8.5 g PRN     ASPERCREME 10 % EX CREA daily use x4 times       ASPIRIN 325 MG OR TBEC 2 TABLET daily       Biotin w/ Vitamins C & E (HAIR/SKIN/NAILS PO) Take 2 capsules by mouth daily       CALCIUM + D OR BID       cetirizine HCl (ZYRTEC ALLERGY) 10 MG CAPS Take 10 mg by mouth daily Take  by mouth. 90 capsule PRN     fluticasone (FLONASE) 50 MCG/ACT nasal spray Spray 2 sprays into both nostrils as needed        hydrOXYzine (ATARAX) 25 MG tablet TAKE TWO TABLETS BY MOUTH DAILY AT BEDTIME AS NEEDED FOR ANXIETY 180 tablet PRN     methocarbamol (ROBAXIN) 750 MG tablet Take 1 tablet (750 mg) by mouth every 6 hours as needed for muscle spasms 180 tablet PRN     MULTIVITAMIN TABS   OR Take one daily       nicotine polacrilex (NICORETTE STARTER KIT) 2 MG gum Place 1 each (2 mg) inside cheek as needed for smoking cessation 30 tablet PRN     oxyCODONE (ROXICODONE) 5 MG tablet Take 1-2 tablets by mouth every 4-6 hours as needed for pain  Must last 30 days 150 tablet 0     pantoprazole (PROTONIX) 40 MG EC tablet Take 1 tablet (40 mg) by mouth daily 90 tablet PRN     PARoxetine (PAXIL) 20 MG tablet Take 0.5 tablets (10 mg) by mouth daily 45 tablet 3     simvastatin (ZOCOR) 20 MG tablet Take  by mouth. 1 TABLET AT BEDTIME 90 tablet 3       Reviewed and updated as needed this visit by Provider         Review of Systems  "  Constitutional, HEENT, cardiovascular, pulmonary, gi and gu systems are negative, except as otherwise noted.       Objective   Vitals - Patient Reported  Weight (Patient Reported): 69.4 kg (153 lb)  Height (Patient Reported): 157.5 cm (5' 2\")  BMI (Based on Pt Reported Ht/Wt): 27.98        healthy, alert and no distress  PSYCH: Alert and oriented times 3; coherent speech, normal   rate and volume, able to articulate logical thoughts, able   to abstract reason, no tangential thoughts, no hallucinations   or delusions  Her affect is normal  RESP: No cough, no audible wheezing, able to talk in full sentences  Remainder of exam unable to be completed due to telephone visits    Diagnostic Test Results:  none         Assessment & Plan     (F33.42) Major depressive disorder, recurrent, in full remission (H)  Comment: stable, on paxil for years, tolerating without side effect  Plan: PARoxetine (PAXIL) 20 MG tablet        Refilled, follow up with PCP as planned next month      See Patient Instructions    No follow-ups on file.    BRIDGER Willson CNP  [facility]    No follow-ups on file.     BRIDGER Willson CNP             "

## 2020-08-12 NOTE — TELEPHONE ENCOUNTER
LM to return clinic phone call. Patient is due for an annual exam for additional refills.    [facility]

## 2020-08-12 NOTE — TELEPHONE ENCOUNTER
Virtual visit scheduled with Emili farr at 4 pm.    Annual wellness visit scheduled with PCP on 9/1.    Juliet BUTCHER    [facility]

## 2020-08-13 ASSESSMENT — ANXIETY QUESTIONNAIRES: GAD7 TOTAL SCORE: 0

## 2020-08-27 ENCOUNTER — TELEPHONE (OUTPATIENT)
Dept: FAMILY MEDICINE | Facility: CLINIC | Age: 70
End: 2020-08-27

## 2020-08-27 DIAGNOSIS — K21.9 GASTROESOPHAGEAL REFLUX DISEASE WITHOUT ESOPHAGITIS: ICD-10-CM

## 2020-08-27 RX ORDER — PANTOPRAZOLE SODIUM 40 MG/1
40 TABLET, DELAYED RELEASE ORAL DAILY
Qty: 90 TABLET | Refills: 1 | Status: SHIPPED | OUTPATIENT
Start: 2020-08-27 | End: 2020-09-01

## 2020-08-27 NOTE — TELEPHONE ENCOUNTER
Central Prior Authorization Team   Phone: [phone]      PA Initiation    Medication: methocarbamol (ROBAXIN) 750 MG tablet, rec 8-27-20  Insurance Company: [employer] - Phone [phone] Fax [phone]  Pharmacy Filling the Rx: [facility][id number] - [place], [place] - [facility] [address]  Filling Pharmacy Phone: [phone]  Filling Pharmacy Fax:    Start Date: 8/27/2020

## 2020-08-27 NOTE — TELEPHONE ENCOUNTER
Prior Authorization Retail Medication Request    Medication/Dose: methocarbamol (ROBAXIN) 750 MG tablet   ICD code (if different than what is on RX):  Previously Tried and Failed:  Rationale:    Insurance Name: [employer] [place]  Insurance ID: [id number]    Pharmacy Information (if different than what is on RX)  Name:  Phone:    Please include previous medications tried and failed.  Please ask insurance for medications on formulary.

## 2020-08-28 NOTE — TELEPHONE ENCOUNTER
Prior Authorization Approval    Authorization Effective Date: 7/28/2020  Authorization Expiration Date: 8/28/2021  Medication: methocarbamol (ROBAXIN) 750 MG tablet, rec 8-27-20  Approved Dose/Quantity:    Reference #:     Insurance Company: [employer] - Phone [phone] Fax [phone]  Expected CoPay:       CoPay Card Available:      Foundation Assistance Needed:    Which Pharmacy is filling the prescription (Not needed for infusion/clinic administered): [facility] #[id number] - [place] [address] [address]  Pharmacy Notified: Yes  Patient Notified: Yes  **Instructed pharmacy to notify patient when script is ready to /ship.**

## 2020-09-01 ENCOUNTER — OFFICE VISIT (OUTPATIENT)
Dept: FAMILY MEDICINE | Facility: CLINIC | Age: 70
End: 2020-09-01
Payer: COMMERCIAL

## 2020-09-01 VITALS
OXYGEN SATURATION: 97 % | TEMPERATURE: 98.3 F | HEART RATE: 85 BPM | DIASTOLIC BLOOD PRESSURE: 76 MMHG | RESPIRATION RATE: 20 BRPM | SYSTOLIC BLOOD PRESSURE: 138 MMHG | HEIGHT: 61 IN | BODY MASS INDEX: 28.7 KG/M2 | WEIGHT: 152 LBS

## 2020-09-01 DIAGNOSIS — E78.5 HYPERLIPIDEMIA LDL GOAL <130: ICD-10-CM

## 2020-09-01 DIAGNOSIS — Z87.891 PERSONAL HISTORY OF TOBACCO USE: ICD-10-CM

## 2020-09-01 DIAGNOSIS — Z00.00 ENCOUNTER FOR MEDICARE ANNUAL WELLNESS EXAM: Primary | ICD-10-CM

## 2020-09-01 DIAGNOSIS — K21.9 GASTROESOPHAGEAL REFLUX DISEASE WITHOUT ESOPHAGITIS: ICD-10-CM

## 2020-09-01 DIAGNOSIS — F33.42 MAJOR DEPRESSIVE DISORDER, RECURRENT, IN FULL REMISSION (H): ICD-10-CM

## 2020-09-01 DIAGNOSIS — G89.4 CHRONIC PAIN SYNDROME: ICD-10-CM

## 2020-09-01 DIAGNOSIS — Z23 NEED FOR PROPHYLACTIC VACCINATION AND INOCULATION AGAINST INFLUENZA: ICD-10-CM

## 2020-09-01 DIAGNOSIS — Z72.0 TOBACCO ABUSE: ICD-10-CM

## 2020-09-01 LAB
AMPHETAMINES UR QL: NOT DETECTED NG/ML
BARBITURATES UR QL SCN: NOT DETECTED NG/ML
BENZODIAZ UR QL SCN: ABNORMAL NG/ML
BUPRENORPHINE UR QL: NOT DETECTED NG/ML
CANNABINOIDS UR QL: NOT DETECTED NG/ML
COCAINE UR QL SCN: NOT DETECTED NG/ML
D-METHAMPHET UR QL: NOT DETECTED NG/ML
METHADONE UR QL SCN: NOT DETECTED NG/ML
OPIATES UR QL SCN: NOT DETECTED NG/ML
OXYCODONE UR QL SCN: ABNORMAL NG/ML
PCP UR QL SCN: NOT DETECTED NG/ML
PROPOXYPH UR QL: NOT DETECTED NG/ML
TRICYCLICS UR QL SCN: NOT DETECTED NG/ML

## 2020-09-01 PROCEDURE — 99214 OFFICE O/P EST MOD 30 MIN: CPT | Mod: 25 | Performed by: NURSE PRACTITIONER

## 2020-09-01 PROCEDURE — 80306 DRUG TEST PRSMV INSTRMNT: CPT | Performed by: NURSE PRACTITIONER

## 2020-09-01 PROCEDURE — 99397 PER PM REEVAL EST PAT 65+ YR: CPT | Performed by: NURSE PRACTITIONER

## 2020-09-01 PROCEDURE — 36415 COLL VENOUS BLD VENIPUNCTURE: CPT | Performed by: NURSE PRACTITIONER

## 2020-09-01 PROCEDURE — 80061 LIPID PANEL: CPT | Performed by: NURSE PRACTITIONER

## 2020-09-01 PROCEDURE — G0296 VISIT TO DETERM LDCT ELIG: HCPCS | Performed by: NURSE PRACTITIONER

## 2020-09-01 PROCEDURE — G0008 ADMIN INFLUENZA VIRUS VAC: HCPCS | Performed by: NURSE PRACTITIONER

## 2020-09-01 PROCEDURE — 80053 COMPREHEN METABOLIC PANEL: CPT | Performed by: NURSE PRACTITIONER

## 2020-09-01 PROCEDURE — 90662 IIV NO PRSV INCREASED AG IM: CPT | Performed by: NURSE PRACTITIONER

## 2020-09-01 RX ORDER — PANTOPRAZOLE SODIUM 40 MG/1
40 TABLET, DELAYED RELEASE ORAL DAILY
Qty: 90 TABLET | Refills: 3 | Status: SHIPPED | OUTPATIENT
Start: 2020-09-01 | End: 2021-05-26

## 2020-09-01 RX ORDER — OXYCODONE HYDROCHLORIDE 5 MG/1
TABLET ORAL
Qty: 150 TABLET | Refills: 0 | Status: SHIPPED | OUTPATIENT
Start: 2020-09-01 | End: 2020-09-01

## 2020-09-01 RX ORDER — PAROXETINE 20 MG/1
10 TABLET, FILM COATED ORAL DAILY
Qty: 45 TABLET | Refills: 3 | Status: SHIPPED | OUTPATIENT
Start: 2020-09-01 | End: 2021-05-26

## 2020-09-01 RX ORDER — OXYCODONE HYDROCHLORIDE 5 MG/1
TABLET ORAL
Qty: 150 TABLET | Refills: 0 | Status: SHIPPED | OUTPATIENT
Start: 2020-10-01 | End: 2020-09-01

## 2020-09-01 RX ORDER — SIMVASTATIN 20 MG
TABLET ORAL
Qty: 90 TABLET | Refills: 3 | Status: SHIPPED | OUTPATIENT
Start: 2020-09-01 | End: 2021-05-26

## 2020-09-01 RX ORDER — OXYCODONE HYDROCHLORIDE 5 MG/1
TABLET ORAL
Qty: 150 TABLET | Refills: 0 | Status: SHIPPED | OUTPATIENT
Start: 2020-10-31 | End: 2020-11-19

## 2020-09-01 RX ORDER — ALBUTEROL SULFATE 90 UG/1
AEROSOL, METERED RESPIRATORY (INHALATION)
Qty: 8.5 G | Status: SHIPPED | OUTPATIENT
Start: 2020-09-01 | End: 2021-10-04

## 2020-09-01 ASSESSMENT — ENCOUNTER SYMPTOMS
ABDOMINAL PAIN: 0
FREQUENCY: 0
HEMATOCHEZIA: 0

## 2020-09-01 ASSESSMENT — MIFFLIN-ST. JEOR: SCORE: 1151.85

## 2020-09-01 ASSESSMENT — ACTIVITIES OF DAILY LIVING (ADL): CURRENT_FUNCTION: NO ASSISTANCE NEEDED

## 2020-09-01 NOTE — PATIENT INSTRUCTIONS
Call [phone] to schedule the lung cancer screening.      Lung Cancer Screening   Frequently Asked Questions  If you are at high-risk for lung cancer, getting screened with low-dose computed tomography (LDCT) every year can help save your life. This handout offers answers to some of the most common questions about lung cancer screening. If you have other questions, please call [phone] ([phone]).     What is it?  Lung cancer screening uses special X-ray technology to create an image of your lung tissue. The exam is quick and easy and takes less than 10 seconds. We don t give you any medicine or use any needles. You can eat before and after the exam. You don t need to change your clothes as long as the clothing on your chest doesn t contain metal. But, you do need to be able to hold your breath for at least 6 seconds during the exam.    What is the goal of lung cancer screening?  The goal of lung cancer screening is to save lives. Many times, lung cancer is not found until a person starts having physical symptoms. Lung cancer screening can help detect lung cancer in the earliest stages when it may be easier to treat.    Who should be screened for lung cancer?  We suggest lung cancer screening for anyone who is at high-risk for lung cancer. You are in the high-risk group if you:      are between the ages of 55 and 79, and    have smoked at least 1 pack of cigarettes a day for 30 or more years, and    still smoke or have quit within the past 15 years.    However, if you have a new cough or shortness of breath, you should talk to your doctor before being screened.    Some national lung health advocacy groups also recommend screening for people ages 50 to 79 who have smoked an average of 1 pack of cigarettes a day for 20 years. They must also have at least 1 other risk factor for lung cancer, not including exposure to secondhand smoke. Other risk factors are having had cancer in the past, emphysema,  pulmonary fibrosis, COPD, a family history of lung cancer, or exposure to certain materials such as arsenic, asbestos, beryllium, cadmium, chromium, diesel fumes, nickel, radon or silica. Your care team can help you know if you have one of these risk factors.     Why does it matter if I have symptoms?  Certain symptoms can be a sign that you have a condition in your lungs that should be checked and treated by your doctor. These symptoms include fever, chest pain, a new or changing cough, shortness of breath that you have never felt before, coughing up blood or unexplained weight loss. Having any of these symptoms can greatly affect the results of lung cancer screening.       Should all smokers get an LDCT lung cancer screening exam?  It depends. Lung cancer screening is for a very specific group of men and women who have a history of heavy smoking over a long period of time (see  Who should be screened for lung cancer  above).  I am in the high-risk group, but have been diagnosed with cancer in the past. Is LDCT lung cancer screening right for me?  In some cases, you should not have LDCT lung screening, such as when your doctor is already following your cancer with CT scan studies. Your doctor will help you decide if LDCT lung screening is right for you.  Do I need to have a screening exam every year?  Yes. If you are in the high-risk group described earlier, you should get an LDCT lung cancer screening exam every year until you are 79, or are no longer willing or able to undergo screening and possible procedures to diagnose and treat lung cancer.  How effective is LDCT at preventing death from lung cancer?  Studies have shown that LDCT lung cancer screening can lower the risk of death from lung cancer by 20 percent in people who are at high-risk.  What are the risks?  There are some risks and limitations of LDCT lung cancer screening. We want to make sure you understand the risks and benefits, so please let us know  if you have any questions. Your doctor may want to talk with you more about these risks.    Radiation exposure: As with any exam that uses radiation, there is a very small increased risk of cancer. The amount of radiation in LDCT is small--about the same amount a person would get from a mammogram. Your doctor orders the exam when he or she feels the potential benefits outweigh the risks.    False negatives: No test is perfect, including LDCT. It is possible that you may have a medical condition, including lung cancer, that is not found during your exam. This is called a false negative result.    False positives and more testing: LDCT very often finds something in the lung that could be cancer, but in fact is not. This is called a false positive result. False positive tests often cause anxiety. To make sure these findings are not cancer, you may need to have more tests. These tests will be done only if you give us permission. Sometimes patients need a treatment that can have side effects, such as a biopsy. For more information on false positives, see  What can I expect from the results?     Findings not related to lung cancer: Your LDCT exam also takes pictures of areas of your body next to your lungs. In a very small number of cases, the CT scan will show an abnormal finding in one of these areas, such as your kidneys, adrenal glands, liver or thyroid. This finding may not be serious, but you may need more tests. Your doctor can help you decide what other tests you may need, if any.  What can I expect from the results?  About 1 out of 4 LDCT exams will find something that may need more tests. Most of the time, these findings are lung nodules. Lung nodules are very small collections of tissue in the lung. These nodules are very common, and the vast majority--more than 97 percent--are not cancer (benign). Most are normal lymph nodes or small areas of scarring from past infections.  But, if a small lung nodule is found  to be cancer, the cancer can be cured more than 90 percent of the time. To know if the nodule is cancer, we may need to get more images before your next yearly screening exam. If the nodule has suspicious features (for example, it is large, has an odd shape or grows over time), we will refer you to a specialist for further testing.  Will my doctor also get the results?  Yes. Your doctor will get a copy of your results.  Is it okay to keep smoking now that there s a cancer screening exam?  No. Tobacco is one of the strongest cancer-causing agents. It causes not only lung cancer, but other cancers and cardiovascular (heart) diseases as well. The damage caused by smoking builds over time. This means that the longer you smoke, the higher your risk of disease. While it is never too late to quit, the sooner you quit, the better.  Where can I find help to quit smoking?  The best way to prevent lung cancer is to stop smoking. If you have already quit smoking, congratulations and keep it up! For help on quitting smoking, please call [employer] at 3-316-936-BDOE ([phone]) or the American Cancer Society at 1-548.859.7090 to find local resources near you.  One-on-one health coaching:  If you d prefer to work individually with a health care provider on tobacco cessation, we offer:      Medication Therapy Management:  Our specially trained pharmacists work closely with you and your doctor to help you quit smoking.  Call [phone] or [phone] (toll free).     Can Do: Health coaching offered by [facility].  [email or web address]

## 2020-09-01 NOTE — PROGRESS NOTES
Lung Cancer Screening Shared Decision Making Visit     Shania Holliday is eligible for lung cancer screening on the basis of the information provided in my signed lung cancer screening order.     I have discussed with patient the risks and benefits of screening for lung cancer with low-dose CT.     The risks include:  radiation exposure: one low dose chest CT has as much ionizing radiation as about 15 chest x-rays or 6 months of background radiation living in [place]    false positives: 96% of positive findings/nodules are NOT cancer, but some might still require additional diagnostic evaluation, including biopsy  over-diagnosis: some slow growing cancers that might never have been clinically significant will be detected and treated unnecessarily     The benefit of early detection of lung cancer is contingent upon adherence to annual screening or more frequent follow up if indicated.     Furthermore, reaping the benefits of screening requires Shania Holliday to be willing and physically able to undergo diagnostic procedures, if indicated. Although no specific guide is available for determining severity of comorbidities, it is reasonable to withhold screening in patients who have greater mortality risk from other diseases.     We did discuss that the only way to prevent lung cancer is to not smoke. Smoking cessation counseling was given, duration 3-10 minutes.      I did offer risk estimation using a calculator such as this one:    ShouldIScreen

## 2020-09-01 NOTE — PROGRESS NOTES
"SUBJECTIVE:   Shania Holliday is a 70 year old female who presents for Preventive Visit.    Are you in the first 12 months of your Medicare coverage?  No    Healthy Habits:     In general, how would you rate your overall health?  Good    Frequency of exercise:  4-5 days/week    Duration of exercise:  15-30 minutes    Do you usually eat at least 4 servings of fruit and vegetables a day, include whole grains    & fiber and avoid regularly eating high fat or \"junk\" foods?  Yes    Taking medications regularly:  Yes    Medication side effects:  No significant flushing    Ability to successfully perform activities of daily living:  No assistance needed    Home Safety:  No safety concerns identified    Hearing Impairment:  No hearing concerns    In the past 6 months, have you been bothered by leaking of urine?  No    In general, how would you rate your overall mental or emotional health?  Good      PHQ-2 Total Score: 0    Additional concerns today:  No    Chronic Pain Follow-Up    Where in your body do you have pain? chronic pain: Neck, back, hips, left knee, feet, hands   How has your pain affected your ability to work? Retired   Which of these pain treatments have you tried since your last clinic visit? None   How well are you sleeping? Fair  How has your mood been since your last visit? About the same  Have you had a significant life event? No  Other aggravating factors: prolonged sitting  Taking medication as directed? Yes    PHQ-9 SCORE 8/7/2019 2/13/2020 8/12/2020   PHQ-9 Total Score - - -   PHQ-9 Total Score MyChart 2 (Minimal depression) - -   PHQ-9 Total Score 2 2 1     NASRA-7 SCORE 8/7/2019 2/13/2020 8/12/2020   Total Score - - -   Total Score 0 (minimal anxiety) - -   Total Score 0 0 0     No flowsheet data found.  Encounter-Level CSA - 08/04/2015:    Controlled Substance Agreement - Scan on 8/6/2015  2:50 PM: CONTROLLED SUBSTANCE AGREEMENT     Patient-Level CSA:    There are no patient-level csa.         Do you " feel safe in your environment? Yes    Have you ever done Advance Care Planning? (For example, a Health Directive, POLST, or a discussion with a medical provider or your loved ones about your wishes): Will give pt information to take home       Fall risk  Fallen 2 or more times in the past year?: No  Any fall with injury in the past year?: No    Cognitive Screening   1) Repeat 3 items (Leader, Season, Table)    2) Clock draw: NORMAL  3) 3 item recall: Recalls 3 objects  Results: 3 items recalled: COGNITIVE IMPAIRMENT LESS LIKELY    Mini-CogTM Copyright [employer]. Licensed by the author for use in [facility] [employer]; reprinted with permission (maxine@[facility]). All rights reserved.      Do you have sleep apnea, excessive snoring or daytime drowsiness?: snoring     Reviewed and updated as needed this visit by clinical staff  Tobacco  Allergies  Meds  Problems  Med Hx  Surg Hx  Fam Hx         Reviewed and updated as needed this visit by Provider  Tobacco  Allergies  Meds  Problems  Med Hx  Surg Hx  Fam Hx        Social History     Tobacco Use     Smoking status: Current Every Day Smoker     Packs/day: 0.50     Years: 38.00     Pack years: 19.00     Types: Cigarettes     Smokeless tobacco: Never Used     Tobacco comment: 4-5 per day.    Substance Use Topics     Alcohol use: No     Alcohol/week: 0.0 standard drinks         Alcohol Use 9/1/2020   Prescreen: >3 drinks/day or >7 drinks/week? No   Prescreen: >3 drinks/day or >7 drinks/week? -     Her pain is stable on her current dose of 5 Vicodin daily.            Current providers sharing in care for this patient include:   Patient Care Team:  Lisa Merino NP as PCP - General  Lisa Merino NP as Assigned PCP    The following health maintenance items are reviewed in Epic and correct as of today:  Health Maintenance   Topic Date Due     COPD ACTION PLAN  1950     HEPATITIS B IMMUNIZATION (1 of 3 - Risk 3-dose series) 07/23/1970     ZOSTER  "IMMUNIZATION (1 of 2) 07/23/2001     LUNG CANCER SCREENING ANNUAL  06/08/2018     ADVANCE CARE PLANNING  04/17/2019     URINE DRUG SCREEN  08/07/2020     INFLUENZA VACCINE (1) 09/01/2020     PHQ-9  02/12/2021     MAMMO SCREENING  05/20/2021     FALL RISK ASSESSMENT  08/12/2021     MEDICARE ANNUAL WELLNESS VISIT  09/01/2021     DTAP/TDAP/TD IMMUNIZATION (3 - Td) 07/30/2023     LIPID  08/07/2024     COLORECTAL CANCER SCREENING  12/15/2024     DEXA  Completed     SPIROMETRY  Completed     HEPATITIS C SCREENING  Completed     DEPRESSION ACTION PLAN  Completed     PNEUMOCOCCAL IMMUNIZATION 65+ LOW/MEDIUM RISK  Completed     IPV IMMUNIZATION  Aged Out     MENINGITIS IMMUNIZATION  Aged Out           Review of Systems   Gastrointestinal: Negative for abdominal pain and hematochezia.   Genitourinary: Negative for frequency.         OBJECTIVE:   /76   Pulse 85   Temp 98.3  F (36.8  C) (Oral)   Resp 20   Ht 1.549 m (5' 1\")   Wt 68.9 kg (152 lb)   SpO2 97%   BMI 28.72 kg/m   Estimated body mass index is 28.72 kg/m  as calculated from the following:    Height as of this encounter: 1.549 m (5' 1\").    Weight as of this encounter: 68.9 kg (152 lb).  Physical Exam  GENERAL: healthy, alert and no distress  EYES: Eyes grossly normal to inspection, PERRL and conjunctivae and sclerae normal  HENT: ear canals and TM's normal, nose and mouth without ulcers or lesions  NECK: no adenopathy, no asymmetry, masses, or scars and thyroid normal to palpation  RESP: lungs clear to auscultation - no rales, rhonchi or wheezes  BREAST: normal without masses, tenderness or nipple discharge and no palpable axillary masses or adenopathy  CV: regular rate and rhythm, normal S1 S2, no S3 or S4, no murmur, click or rub, no peripheral edema and peripheral pulses strong  ABDOMEN: soft, nontender, no hepatosplenomegaly, no masses and bowel sounds normal  MS: decreased ROM of the lumbar spine  SKIN: no suspicious lesions or rashes  NEURO: Normal " strength and tone, mentation intact and speech normal  PSYCH: mentation appears normal, affect normal/bright        ASSESSMENT / PLAN:   (Z00.00) Encounter for Medicare annual wellness exam  (primary encounter diagnosis)  Comment:   Plan:     (G89.4) Chronic pain syndrome  Comment:   Plan: Drug Abuse Screen Panel 13, Urine (Pain Care         Package), Comprehensive metabolic panel (BMP +         Alb, Alk Phos, ALT, AST, Total. Bili, TP),         oxyCODONE (ROXICODONE) 5 MG tablet,         DISCONTINUED: oxyCODONE (ROXICODONE) 5 MG         tablet, DISCONTINUED: oxyCODONE (ROXICODONE) 5         MG tablet        Monitoring of high risk medications.   checked.  Updated CSA reviewed and signed.  UDS done.      (Z87.891) Personal history of tobacco use  Comment:   Plan: Prof fee: Shared Decisionmaking for Lung Cancer        Screening, CT Chest Lung Cancer Scrn Low Dose         wo            (Z72.0) Tobacco abuse  Comment:   Plan: albuterol (PROAIR HFA) 108 (90 Base) MCG/ACT         inhaler        Recommended smoking cessation.     (E78.5) Hyperlipidemia LDL goal <130  Comment:   Plan: Comprehensive metabolic panel (BMP + Alb, Alk         Phos, ALT, AST, Total. Bili, TP), Lipid panel         reflex to direct LDL Fasting, simvastatin         (ZOCOR) 20 MG tablet        The current medical regimen is effective;  continue present plan and medications.     (F33.42) Major depressive disorder, recurrent, in full remission (H)  Comment: in remission  Plan: PARoxetine (PAXIL) 20 MG tablet        The current medical regimen is effective;  continue present plan and medications.     (K21.9) Gastroesophageal reflux disease without esophagitis  Comment:   Plan: pantoprazole (PROTONIX) 40 MG EC tablet        The current medical regimen is effective;  continue present plan and medications.                 COUNSELING:  Reviewed preventive health counseling, as reflected in patient instructions    Estimated body mass index is 28.72 kg/m   "as calculated from the following:    Height as of this encounter: 1.549 m (5' 1\").    Weight as of this encounter: 68.9 kg (152 lb).        She reports that she has been smoking cigarettes. She has a 19.00 pack-year smoking history. She has never used smokeless tobacco.  Tobacco Cessation Action Plan:   Information offered: Patient not interested at this time      Appropriate preventive services were discussed with this patient, including applicable screening as appropriate for cardiovascular disease, diabetes, osteopenia/osteoporosis, and glaucoma.  As appropriate for age/gender, discussed screening for colorectal cancer, prostate cancer, breast cancer, and cervical cancer. Checklist reviewing preventive services available has been given to the patient.    Reviewed patients plan of care and provided an AVS. The Basic Care Plan (routine screening as documented in Health Maintenance) for Shania meets the Care Plan requirement. This Care Plan has been established and reviewed with the Patient.    Counseling Resources:  ATP IV Guidelines  Pooled Cohorts Equation Calculator  Breast Cancer Risk Calculator  Breast Cancer: Medication to Reduce Risk  FRAX Risk Assessment  ICSI Preventive Guidelines  Dietary Guidelines for Americans, 2010  USDA's MyPlate  ASA Prophylaxis  Lung CA Screening    Lisa Merino NP  [facility]    Identified Health Risks:  "

## 2020-09-01 NOTE — LETTER
[facility]  09/01/20    Patient: Shania Holliday  YOB: 1950  Medical Record Number: [id number]                                                                  Opioid / Opioid Plus Controlled Substance Agreement    I understand that my care provider has prescribed an opioid (narcotic) controlled substance to help manage my condition(s). I am taking this medicine to help me function or work. I know this is strong medicine, and that it can cause serious side effects. Opioid medicine can be sedating, addicting and may cause a dependency on the drug. They can affect my ability to drive or think, and cause depression. They need to be taken exactly as prescribed. Combining opioids with certain medicines or chemicals (such as cocaine, sedatives and tranquilizers, sleeping pills, meth) can be dangerous or even fatal. Also, if I stop opioids suddenly, I may have severe withdrawal symptoms. Last, I understand that opioids do not work for all types of pain nor for all patients. If not helpful, I may be asked to stop them.        The risks, benefits, and side effects of these medicine(s) were explained to me. I agree that:    1. I will take part in other treatments as advised by my care team. This may be psychiatry or counseling, physical therapy, behavioral therapy, group treatment or a referral to a pain clinic. I will reduce or stop my medicine when my care team tells me to do so.  2. I will take my medicines as prescribed. I will not change the dose or schedule unless my care team tells me to. There will be no refills if I  run out early.   I may be contactedwithout warning and asked to complete a urine drug test or pill count at any time.   3. I will keep all my appointments, and understand this is part of the monitoring of opioids. My care team may require an office visit for EVERY opioid/controlled substance refill. If I miss appointments or don t follow instructions, my care team may stop  my medicine.  4. I will not ask other providers to prescribe controlled substances, and I will not accept controlled substances from other people. If I need another prescribed controlled substance for a new reason, I will tell my care team within 1 business day.  5. I will use one pharmacy to fill all of my controlled substance prescriptions, and it is up to me to make sure that I do not run out of my medicines on weekends or holidays. If my care team is willing to refill my opioid prescription without a visit, I must request refills only during office hours, refills may take up to 3 days to process, and it may take up to 5 to 7 days for my medicine to be mailed and ready at my pharmacy. Prescriptions will not be mailed anywhere except my pharmacy.        [id number]  Rev 12/18         Registration to scan to EHR                             Page 1 of 2               Controlled Substance Agreement Opioid        [facility]  09/01/20  Patient: Shania Holliday  YOB: 1950  Medical Record Number: [id number]                                                                  6. I am responsible for my prescriptions. If the medicine/prescription is lost or stolen, it will not be replaced. I also agree not to share controlled substance medicines with anyone.  7. I agree to not use ANY illegal or recreational drugs. This includes marijuana, cocaine, bath salts or other drugs. I agree not to use alcohol unless my care team says I may.          I agree to give urine samples whenever asked. If I don t give a urine sample, the care team may stop my medicine.    8. If I enroll in the [place] Medical Marijuana program, I will tell my care team. I will also sign an agreement to share my medical records with my care team.   9. I will bring in my list of medicines (or my medicine bottles) each time I come to the clinic.   10. I will tell my care team right away if I become pregnant or have a new medical problem  treated outside of my regular clinic.  11. I understand that this medicine can affect my thinking and judgment. It may be unsafe for me to drive, use machinery and do dangerous tasks. I will not do any of these things until I know how the medicine affects me. If my dose changes, I will wait to see how it affects me. I will contact my care team if I have concerns about medicine side effects.    I understand that if I do not follow any of the conditions above, my prescriptions or treatment may be stopped.      I agree that my provider, clinic care team, and pharmacy may work with any city, state or federal law enforcement agency that investigates the misuse, sale, or other diversion of my controlled medicine. I will allow my provider to discuss my care with or share a copy of this agreement with any other treating provider, pharmacy or emergency room where I receive care. I agree to give up (waive) any right of privacy or confidentiality with respect to these consents.     I have read this agreement and have asked questions about anything I did not understand.      ________________________________________________________________________  Patient signature - Date/Time -  Shania Holliday                                      ________________________________________________________________________  Witness signature                                                            ________________________________________________________________________  Provider signature - Lisa Merino, NP      [id number]  Rev 12/18         Registration to scan to EHR                         Page 2 of 2                   Controlled Substance Agreement Opioid           Page 1 of 2  Opioid Pain Medicines (also known as Narcotics)  What You Need to Know    What are opioids?   Opioids are pain medicines that must be prescribed by a doctor.  They are also known as narcotics.    Examples are:     morphine (MS Contin, Jeannie)    oxycodone  (Oxycontin)    oxycodone and acetaminophen (Percocet)    hydrocodone and acetaminophen (Vicodin, Norco)     fentanyl patch (Duragesic)     hydromorphone (Dilaudid)     methadone     What do opioids do well?   Opioids are best for short-term pain after a surgery or injury. They also work well for cancer pain. Unlike other pain medicines, they do not cause liver or kidney failure or ulcers. They may help some people with long-lasting (chronic) pain.     What do opioids NOT do well?   Opioids never get rid of pain entirely, and they do not work well for most patients with chronic pain. Opioids do not reduce swelling, one of the causes of pain. They also don t work well for nerve pain.                           For informational purposes only.  Not to replace the advice of your care provider.  Copyright [facility]. All right reserved. [employer] 639316-Qnj 02/18.      Page 2 of 2    Risks and side effects   Talk to your doctor before you start or decide to keep taking one of these medicines. Side effects include:    Lowering your breathing rate enough to cause death    Overdose, including death, especially if taking higher than prescribed doses    Long-term opioid use    Worse depression symptoms; less pleasure in things you usually enjoy    Feeling tired or sluggish    Slower thoughts or cloudy thinking    Being more sensitive to pain over time; pain is harder to control    Trouble sleeping or restless sleep    Changes in hormone levels (for example, less testosterone)    Changes in sex drive or ability to have sex    Constipation    Unsafe driving    Itching and sweating    Feeling dizzy    Nausea, vomiting and dry mouth    What else should I know about opioids?  When someone takes opioids for too long or too often, they become dependent. This means that if you stop or reduce the medicine too quickly, you will have withdrawal symptoms.    Dependence is not the same as addiction. Addiction is when  people keep using a substance that harms their body, their mind or their relations with others. If you have a history of drug or alcohol abuse, taking opioids can cause a relapse.    Over time, opioids don t work as well. Most people will need higher and higher doses. The higher the dose, the more serious the side effects. We don t know the long-term effects of opioids.      Prescribed opioids aren't the best way to manage chronic pain    Other ways to manage pain include:      Ibuprofen or acetaminophen.  You should always try this first.      Treat health problems that may be causing pain.      acupuncture or massage, deep breathing, meditation, visual imagery, aromatherapy.      Use heat or ice at the pain site      Physical therapy and exercise      Stop smoking      See a counselor or therapist                                                  People who have used opioids for a long time may have a lower quality of life, worse depression, higher levels of pain and more visits to doctors.    Never share your opioids with others. Be sure to store opioids in a secure place, locked if possible.Young children can easily swallow them and overdose.     You can overdose on opioids.  Signs of overdose include decrease or loss of consciousness, slowed breathing, trouble waking and blue lips.  If someone is worried about overdose, they should call 911.    If you are at risk for overdose, you may get naloxone (Narcan, a medicine that reverses the effects of opioids.  If you overdose, a friend or family member can give you Narcan while waiting for the ambulance.  They need to know the signs of overdose and how to give Narcan.    While you're taking opioids:    Don't use alcohol or street drugs. Taking them together can cause death.    Don't take any of these medicines unless your doctor says its okay.  Taking these with opioids can cause death.    Benzodiazepines (such as lorazepam         or diazepam)    Muscle relaxers  (such as cyclobenzaprine)    sleeping pills    other opioids    Safe disposal of opioids  Find your area drug take-back program, your pharmacy mail-back program, buy a special disposal bag (such as Deterra) from your pharmacy or flush them down the toilet.  Use the guidelines at:  www.fda.gov/drugs/resourcesforyou

## 2020-09-02 LAB
ALBUMIN SERPL-MCNC: 4.2 G/DL (ref 3.4–5)
ALP SERPL-CCNC: 80 U/L (ref 40–150)
ALT SERPL W P-5'-P-CCNC: 20 U/L (ref 0–50)
ANION GAP SERPL CALCULATED.3IONS-SCNC: 9 MMOL/L (ref 3–14)
AST SERPL W P-5'-P-CCNC: 16 U/L (ref 0–45)
BILIRUB SERPL-MCNC: 0.4 MG/DL (ref 0.2–1.3)
BUN SERPL-MCNC: 9 MG/DL (ref 7–30)
CALCIUM SERPL-MCNC: 9.2 MG/DL (ref 8.5–10.1)
CHLORIDE SERPL-SCNC: 103 MMOL/L (ref 94–109)
CHOLEST SERPL-MCNC: 205 MG/DL
CO2 SERPL-SCNC: 24 MMOL/L (ref 20–32)
CREAT SERPL-MCNC: 0.71 MG/DL (ref 0.52–1.04)
GFR SERPL CREATININE-BSD FRML MDRD: 86 ML/MIN/{1.73_M2}
GLUCOSE SERPL-MCNC: 100 MG/DL (ref 70–99)
HDLC SERPL-MCNC: 78 MG/DL
LDLC SERPL CALC-MCNC: 111 MG/DL
NONHDLC SERPL-MCNC: 127 MG/DL
POTASSIUM SERPL-SCNC: 4.5 MMOL/L (ref 3.4–5.3)
PROT SERPL-MCNC: 7.2 G/DL (ref 6.8–8.8)
SODIUM SERPL-SCNC: 136 MMOL/L (ref 133–144)
TRIGL SERPL-MCNC: 79 MG/DL

## 2020-10-01 ENCOUNTER — ANCILLARY PROCEDURE (OUTPATIENT)
Dept: CT IMAGING | Facility: CLINIC | Age: 70
End: 2020-10-01
Attending: NURSE PRACTITIONER
Payer: COMMERCIAL

## 2020-10-01 ENCOUNTER — ANCILLARY PROCEDURE (OUTPATIENT)
Dept: MAMMOGRAPHY | Facility: CLINIC | Age: 70
End: 2020-10-01
Attending: NURSE PRACTITIONER
Payer: COMMERCIAL

## 2020-10-01 DIAGNOSIS — Z12.31 VISIT FOR SCREENING MAMMOGRAM: ICD-10-CM

## 2020-10-01 DIAGNOSIS — Z87.891 PERSONAL HISTORY OF TOBACCO USE: ICD-10-CM

## 2020-10-01 PROCEDURE — G0297 LDCT FOR LUNG CA SCREEN: HCPCS | Mod: TC | Performed by: RADIOLOGY

## 2020-10-01 PROCEDURE — 77067 SCR MAMMO BI INCL CAD: CPT | Performed by: RADIOLOGY

## 2020-10-04 PROBLEM — R91.1 LUNG NODULE: Status: ACTIVE | Noted: 2020-10-04

## 2020-10-06 DIAGNOSIS — R91.1 LUNG NODULE: Primary | ICD-10-CM

## 2020-10-06 DIAGNOSIS — K76.9 LIVER LESION: ICD-10-CM

## 2020-10-09 DIAGNOSIS — R91.1 LUNG NODULE: Primary | ICD-10-CM

## 2020-10-12 ENCOUNTER — TELEPHONE (OUTPATIENT)
Dept: SURGERY | Facility: CLINIC | Age: 70
End: 2020-10-12

## 2020-10-12 ENCOUNTER — TELEPHONE (OUTPATIENT)
Dept: ONCOLOGY | Facility: CLINIC | Age: 70
End: 2020-10-12

## 2020-10-12 NOTE — TELEPHONE ENCOUNTER
The patient said she is going to speak with her nurse first and then she will call back to schedule the appointment.

## 2020-10-12 NOTE — TELEPHONE ENCOUNTER
I tried to reach kyle Jauregui with my office # and told her I would be in the office tomorrow, asking that she call me to discuss her recent LCSCT and recommendations from our conference to get PFT's and arrange consultation with thoracic surgery to discuss resection.    Message sent to scheduling.

## 2020-10-13 ENCOUNTER — TELEPHONE (OUTPATIENT)
Dept: ONCOLOGY | Facility: CLINIC | Age: 70
End: 2020-10-13

## 2020-10-13 NOTE — TELEPHONE ENCOUNTER
The patient said she needs to talk to her nurse first to coordinate some test and she will call back to schedule her appointment.

## 2020-10-16 ENCOUNTER — PRE VISIT (OUTPATIENT)
Dept: SURGERY | Facility: CLINIC | Age: 70
End: 2020-10-16

## 2020-10-16 NOTE — TELEPHONE ENCOUNTER
ONCOLOGY INTAKE: Records Information      APPT INFORMATION:  Referring provider:  Dr. Merino  Referring provider s clinic:  [facility]  Reason for visit/diagnosis:  lung nodules  Has patient been notified of appointment date and time?: Yes    RECORDS INFORMATION:  Were the records received with the referral (via [employer])? no    Has patient been seen for any external appt for this diagnosis? no    If yes, where? n/a    Has patient had any imaging or procedures outside of [facility] for this condition? no      If Yes, where? n/a    ADDITIONAL INFORMATION:  none

## 2020-10-19 ENCOUNTER — HOSPITAL ENCOUNTER (OUTPATIENT)
Dept: MRI IMAGING | Facility: CLINIC | Age: 70
Discharge: HOME | End: 2020-10-19
Attending: NURSE PRACTITIONER | Admitting: NURSE PRACTITIONER
Payer: COMMERCIAL

## 2020-10-19 DIAGNOSIS — K76.9 LIVER LESION: ICD-10-CM

## 2020-10-19 PROCEDURE — 74183 MRI ABD W/O CNTR FLWD CNTR: CPT

## 2020-10-19 PROCEDURE — 255N000002 HC RX 255 OP 636: Performed by: NURSE PRACTITIONER

## 2020-10-19 PROCEDURE — 258N000003 HC RX IP 258 OP 636: Performed by: NURSE PRACTITIONER

## 2020-10-19 PROCEDURE — A9585 GADOBUTROL INJECTION: HCPCS | Performed by: NURSE PRACTITIONER

## 2020-10-19 RX ORDER — GADOBUTROL 604.72 MG/ML
7 INJECTION INTRAVENOUS ONCE
Status: COMPLETED | OUTPATIENT
Start: 2020-10-19 | End: 2020-10-19

## 2020-10-19 RX ADMIN — SODIUM CHLORIDE 50 ML: 9 INJECTION, SOLUTION INTRAVENOUS at 14:13

## 2020-10-19 RX ADMIN — GADOBUTROL 7 ML: 604.72 INJECTION INTRAVENOUS at 14:13

## 2020-10-19 NOTE — TELEPHONE ENCOUNTER
RECORDS STATUS - ALL OTHER DIAGNOSIS      RECORDS RECEIVED FROM: [facility]   DATE RECEIVED: 10/19/20   NOTES STATUS DETAILS   OFFICE NOTE from referring provider Lisa Nguyen NP    Team Conference: 10/9/20   OFFICE NOTE from medical oncologist     DISCHARGE SUMMARY from hospital     DISCHARGE REPORT from the ER     OPERATIVE REPORT     MEDICATION LIST [facility]    CLINICAL TRIAL TREATMENTS TO DATE     LABS     PATHOLOGY REPORTS     ANYTHING RELATED TO DIAGNOSIS     GENONOMIC TESTING     TYPE:     IMAGING (NEED IMAGES & REPORT)     CT SCANS PACS 10/1/20   MRI PACS Scheduled @  10/19/20   MAMMO PACS 10/1/20   ULTRASOUND     PET

## 2020-10-20 DIAGNOSIS — R91.1 LUNG NODULE: ICD-10-CM

## 2020-10-20 PROCEDURE — 94726 PLETHYSMOGRAPHY LUNG VOLUMES: CPT | Performed by: INTERNAL MEDICINE

## 2020-10-20 PROCEDURE — 94060 EVALUATION OF WHEEZING: CPT | Performed by: INTERNAL MEDICINE

## 2020-10-20 PROCEDURE — 94729 DIFFUSING CAPACITY: CPT | Performed by: INTERNAL MEDICINE

## 2020-10-20 NOTE — PROGRESS NOTES
THORACIC SURGERY - NEW PATIENT OFFICE VISIT      Dear Dr. Goncalves,    I saw Ms. Holliday at Dr. Goncalves s request in consultation for the evaluation and treatment of a left upper lobe nodule .     HPI  Ms.Jill DOROTA Holliday is a 70 year old year-old female who presented to the thoracic clinic for evaluation of left upper lobe nodule. Pt is a current 1/2 ppd smoker (38 years) who underwent lung cancer screening CT scan in 2017 and was found to have 4 mm YANNA nodule. Repeat CT scan this year showed enlarging spiculated nodule in YANNA measuring 1.1 x 0.9 cm.   She is quite functional and is able to do most activities and climb a flight of stairs without getting dyspneic    Previsit Tests   CT chest 10/1/2020    MR liver 10/19/2020    A 3.6 x 3 cm T2 hyperintense lesion in the posterior segment of the right hepatic lobe superiorly has increased slightly in size compared to 7/25/2017 (previously by my measurement 3.4 x 2.9 cm) consistent with a benign hemangioma.  PFT 10/19/2020    PMH  Depression  GERD  Chronic pain  Emphysema     Past surgical hx  None     ETOH: Occasional   TOB: Current smoker 1/2 ppd (38 years)         From a personal perspective, she is currenlty retired.  She has a son who was recently diagnosed with cancer and is getting his PET scan today. She is anxious about this.      IMPRESSION   70 year old year-old female with growing YANNA nodule      PLAN    I spent a total of 45 minutes with Ms. Holliday , more than 50% of which were spent in counseling, coordination of care, and face-to-face time. I reviewed the plan as follows:  We discussed that given the suspicion of a malignancy in the nodule, it certainly did make sense to offer her surgery (wedge possible lobe). We also dicussed the role of a needle biopsy but given the growth and risk factors, I would recommend surgical resection for her.   However, she is very anxious at this time due to her son's cancer diagnosis and is worried about not being able to quit smoking  and the associated risks from it and prefers to go with the IR biopsy first.   We will re-discuss at nodule conference.  They had all their questions answered and were in agreement with the plan.  I appreciate the opportunity to participate in the care of your patient and will keep you updated.  Sincerely,

## 2020-10-21 LAB
DLCOUNC-%PRED-PRE: 82 %
DLCOUNC-PRE: 14.72 ML/MIN/MMHG
DLCOUNC-PRED: 17.92 ML/MIN/MMHG
ERV-%PRED-PRE: 119 %
ERV-PRE: 0.61 L
ERV-PRED: 0.51 L
EXPTIME-PRE: 9.61 SEC
FEF2575-%PRED-POST: 51 %
FEF2575-%PRED-PRE: 37 %
FEF2575-POST: 0.93 L/SEC
FEF2575-PRE: 0.68 L/SEC
FEF2575-PRED: 1.8 L/SEC
FEFMAX-%PRED-PRE: 83 %
FEFMAX-PRE: 4.48 L/SEC
FEFMAX-PRED: 5.34 L/SEC
FEV1-%PRED-PRE: 77 %
FEV1-PRE: 1.59 L
FEV1FEV6-PRE: 65 %
FEV1FEV6-PRED: 79 %
FEV1FVC-PRE: 62 %
FEV1FVC-PRED: 79 %
FEV1SVC-PRE: 57 %
FEV1SVC-PRED: 74 %
FIFMAX-PRE: 3.28 L/SEC
FRCPLETH-%PRED-PRE: 133 %
FRCPLETH-PRE: 3.42 L
FRCPLETH-PRED: 2.57 L
FVC-%PRED-PRE: 97 %
FVC-PRE: 2.57 L
FVC-PRED: 2.63 L
IC-%PRED-PRE: 95 %
IC-PRE: 2.16 L
IC-PRED: 2.25 L
MVV-%PRED-PRE: 85 %
MVV-PRE: 70 L/MIN
MVV-PRED: 81 L/MIN
RVPLETH-%PRED-PRE: 145 %
RVPLETH-PRE: 2.81 L
RVPLETH-PRED: 1.93 L
TLCPLETH-%PRED-PRE: 123 %
TLCPLETH-PRE: 5.58 L
TLCPLETH-PRED: 4.52 L
VA-%PRED-PRE: 94 %
VA-PRE: 3.99 L
VC-%PRED-PRE: 100 %
VC-PRE: 2.77 L
VC-PRED: 2.76 L

## 2020-10-22 ENCOUNTER — VIRTUAL VISIT (OUTPATIENT)
Dept: SURGERY | Facility: CLINIC | Age: 70
End: 2020-10-22
Attending: NURSE PRACTITIONER
Payer: COMMERCIAL

## 2020-10-22 DIAGNOSIS — R91.1 LUNG NODULE: Primary | ICD-10-CM

## 2020-10-22 PROCEDURE — 999N001193 HC VIDEO/TELEPHONE VISIT; NO CHARGE

## 2020-10-22 PROCEDURE — 99204 OFFICE O/P NEW MOD 45 MIN: CPT | Mod: 95 | Performed by: STUDENT IN AN ORGANIZED HEALTH CARE EDUCATION/TRAINING PROGRAM

## 2020-10-22 NOTE — LETTER
10/22/2020         RE: Shania Holliday  [address] [place] [address]        Dear Colleague,    Thank you for referring your patient, Shania Holliday, to the [facility]. Please see a copy of my visit note below.    THORACIC SURGERY - NEW PATIENT OFFICE VISIT      Dear Dr. Goncalves,    I saw Ms. Holliday at Dr. Goncalves s request in consultation for the evaluation and treatment of a left upper lobe nodule .     HPI  Ms.Jill DOROTA Holliday is a 70 year old year-old female who presented to the thoracic clinic for evaluation of left upper lobe nodule. Pt is a current 1/2 ppd smoker (38 years) who underwent lung cancer screening CT scan in 2017 and was found to have 4 mm YANNA nodule. Repeat CT scan this year showed enlarging spiculated nodule in YANNA measuring 1.1 x 0.9 cm.   She is quite functional and is able to do most activities and climb a flight of stairs without getting dyspneic    Previsit Tests   CT chest 10/1/2020    MR liver 10/19/2020    A 3.6 x 3 cm T2 hyperintense lesion in the posterior segment of the right hepatic lobe superiorly has increased slightly in size compared to 7/25/2017 (previously by my measurement 3.4 x 2.9 cm) consistent with a benign hemangioma.  PFT 10/19/2020    PMH  Depression  GERD  Chronic pain  Emphysema     Past surgical hx  None     ETOH: Occasional   TOB: Current smoker 1/2 ppd (38 years)         From a personal perspective, she is currenlty retired.  She has a son who was recently diagnosed with cancer and is getting his PET scan today. She is anxious about this.      IMPRESSION   70 year old year-old female with growing YANNA nodule      PLAN    I spent a total of 45 minutes with Ms. Holliday , more than 50% of which were spent in counseling, coordination of care, and face-to-face time. I reviewed the plan as follows:  We discussed that given the suspicion of a malignancy in the nodule, it certainly did make sense to offer her surgery (wedge possible lobe). We  "also dicussed the role of a needle biopsy but given the growth and risk factors, I would recommend surgical resection for her.   However, she is very anxious at this time due to her son's cancer diagnosis and is worried about not being able to quit smoking and the associated risks from it and prefers to go with the IR biopsy first.   We will re-discuss at nodule conference.  They had all their questions answered and were in agreement with the plan.  I appreciate the opportunity to participate in the care of your patient and will keep you updated.  Sincerely,      Shania Holliday is a 70 year old female who is being evaluated via a billable telephone visit.      The patient has been notified of following:     \"This telephone visit will be conducted via a call between you and your physician/provider. We have found that certain health care needs can be provided without the need for a physical exam.  This service lets us provide the care you need with a short phone conversation.  If a prescription is necessary we can send it directly to your pharmacy.  If lab work is needed we can place an order for that and you can then stop by our lab to have the test done at a later time.    Telephone visits are billed at different rates depending on your insurance coverage. During this emergency period, for some insurers they may be billed the same as an in-person visit.  Please reach out to your insurance provider with any questions.    If during the course of the call the physician/provider feels a telephone visit is not appropriate, you will not be charged for this service.\"    Patient has given verbal consent for Telephone visit?  Yes    What phone number would you like to be contacted at? [phone]    How would you like to obtain your AVS? Mail a copy     I have reviewed and updated the patient's allergies and medication list. Patient was asked to provide any patient recorded vital signs, height and/or weight.  Please see in " "\"Patient Reported Vital Signs\" tab information.      There were no vitals taken for this visit.    Concerns: Patient has no new concerns.      Refills: None         Sharath Toussaint, LEANN          Phone call duration:  minutes    Sara Monzon MD      "

## 2020-10-22 NOTE — PROGRESS NOTES
"Shania Holliday is a 70 year old female who is being evaluated via a billable telephone visit.      The patient has been notified of following:     \"This telephone visit will be conducted via a call between you and your physician/provider. We have found that certain health care needs can be provided without the need for a physical exam.  This service lets us provide the care you need with a short phone conversation.  If a prescription is necessary we can send it directly to your pharmacy.  If lab work is needed we can place an order for that and you can then stop by our lab to have the test done at a later time.    Telephone visits are billed at different rates depending on your insurance coverage. During this emergency period, for some insurers they may be billed the same as an in-person visit.  Please reach out to your insurance provider with any questions.    If during the course of the call the physician/provider feels a telephone visit is not appropriate, you will not be charged for this service.\"    Patient has given verbal consent for Telephone visit?  Yes    What phone number would you like to be contacted at? [phone]    How would you like to obtain your AVS? Mail a copy     I have reviewed and updated the patient's allergies and medication list. Patient was asked to provide any patient recorded vital signs, height and/or weight.  Please see in \"Patient Reported Vital Signs\" tab information.      There were no vitals taken for this visit.    Concerns: Patient has no new concerns.      Refills: None         LEANN Moseley          Phone call duration:  minutes    Sara Monzon MD      "

## 2020-10-23 ENCOUNTER — TEAM CONFERENCE (OUTPATIENT)
Dept: SURGERY | Facility: CLINIC | Age: 70
End: 2020-10-23

## 2020-10-23 DIAGNOSIS — R91.1 LUNG NODULE: Primary | ICD-10-CM

## 2020-10-23 NOTE — TELEPHONE ENCOUNTER
Pulmonary Nodule Conference      Patient Name: Shania Holliday    Reason for conference discussion (brief overview): 71 yo current 1/2 ppd smoker (38 years).  Had LCSCT with enlarging spiculated nodule in YANNA Series 4 Image 106 measuring 1.1 x 0.9 cm, previously 0.4 cm in 2017.  Also has 3.4 cm right hepatic dome lesion, slightly larger at 3.8 cm.  Co-morbidities include emphysema, depression, chronic pain/opioid dependence. Saw  yesterday and would rather get a biopsy in IR. She is helping her son deal with a new diagnosis of cancer and she cannot be laid up in the hospital for a few days and then deal with post-op recovery at this time.    Specific Question:  Can IR biopsy the nodule?    Pertinent Histology:  None    Referring Physician: Dr. Sara Monzon    The patient's case was presented at the multidisciplinary conference for the above noted reason.  There was a consensus recommendation for the following actions:     No IR representation at conference today. Daysi Fall will send an email to the IR providers asking them to review and determine if this is something that can be done in IR.      Case Lead:  Daysi Fall    Interventional Radiology Staff Present: None

## 2020-10-23 NOTE — TELEPHONE ENCOUNTER
Dr. Barboza, IR reviewed this case and approved a needle biopsy of the YANNA nodule with CT guidance.    I will place the referral.

## 2020-10-26 DIAGNOSIS — R91.8 PULMONARY NODULES: Primary | ICD-10-CM

## 2020-10-26 RX ORDER — DEXTROSE MONOHYDRATE 25 G/50ML
25-50 INJECTION, SOLUTION INTRAVENOUS
Status: DISCONTINUED | OUTPATIENT
Start: 2020-10-26 | End: 2020-10-26 | Stop reason: HOSPADM

## 2020-10-26 RX ORDER — NICOTINE POLACRILEX 4 MG
15-30 LOZENGE BUCCAL
Status: DISCONTINUED | OUTPATIENT
Start: 2020-10-26 | End: 2020-10-26 | Stop reason: HOSPADM

## 2020-10-26 NOTE — PROGRESS NOTES
Patient Name:  Shania Holliday   YOB: 1950  Medical Record Number (MRN):  [id number]  Age:  70 year old female    Referring Provider:      Reason for Referral:  Interventional Radiology consult.    Requested Intervention:  Lung biopsy    Associated Diagnosis:  YANNA lesion.    =====    History of Present Illness: Patient is a 70 year old lady with a history of 19 pack/year smoking history, as well as history of Emphysema and chronic opiod dependence. She has an enlarging YANNA lesion (CT 10/1/20, S 4 I 106). Patient discussed at pulmonary nodule conference on 10/19/20, with recommendation to obtain PFTs and thoracic surgery consultation. Patient declines surgical biopsy at this time, citing the need to help her son deal with a new cancer diagnosis. Repeat pulmonary nodule conference (10/23/20) with recommendation to pursue percutaneous IR biopsy. Images reviewed by Dr. Barboza.    =====    Past Medical History:  Past Medical History:   Diagnosis Date     Backache, unspecified      Cervicalgia      Depressive disorder, not elsewhere classified      Disorder of bone and cartilage, unspecified      Esophageal reflux      Essential and other specified forms of tremor      Headache(784.0)      Hemangioma of unspecified site      Other genital herpes      Other specified idiopathic peripheral neuropathy      Pure hypercholesterolemia      Temporomandibular joint disorders, unspecified      Tobacco use disorder        Social History:  Social History     Tobacco Use     Smoking status: Current Every Day Smoker     Packs/day: 0.50     Years: 38.00     Pack years: 19.00     Types: Cigarettes     Smokeless tobacco: Never Used     Tobacco comment: 4-5 per day.    Substance Use Topics     Alcohol use: No     Alcohol/week: 0.0 standard drinks         Allergies:  Allergies   Allergen Reactions     Macadamia Nut Oil Shortness Of Breath and Swelling     Macadamia nuts     Neurontin [Gabapentin] Visual Disturbance      Ended up in the hospital. strabismus, required an MRI.      Aleve [Naproxen Sodium]      Aloe Vera Rash     Baclofen Other (See Comments)     Headaches     Clinoril [Sulindac]      Darvocet [Propoxyphene N-Apap]      Dustmites [Dust Mites]      Flagyl [Metronidazole] Hives     Dentist put pt on medication. Caused hives and swelling.      Fosamax      Mold      Morphine      Msg [Monosodium Glutamate]      Nsaids      Trazodone [Trazodone Derivatives] Visual Disturbance     strabismus     Vioxx [Rofecoxib]        Vital Signs:  There were no vitals taken for this visit.    =====    Results Reviewed:    Complete Blood Count  Lab Results     Lab Results   Component Value Date     03/02/2015         Coagulation  Lab Results   Component Value Date    INR 0.98 03/02/2015       =====    Imaging Reviewed:  I have personally reviewed images from CT (10/1/20). This patient was discussed with Dr. Mancia.    =====    Attempt to reach the patient via telephone was unsuccessful. It is presumed that the patient would request conscious sedation for the procedure.  =====    Sedation plan:  Moderate sedation, utilizing midazolam and fentanyl    I have reviewed the patient data and determined they are a candidate for sedation.    =====    PATIENT INSTRUCTIONS AND GUIDELINES:       No solid foods or milk products for 6 hours prior to the procedure.    You may have clear liquids up to 2 hours prior to the procedure.    If you or having your procedure in Interventional Radiology (IR); arrive to the Gold Waiting room at your scheduled arrival time on the day of your procedure. This is on the main floor of the hospital, located down the ann from the main hospital lobby.    =====    ASSESSMENT AND PLAN:    Patient is a 70 year old lady with a history of YANNA lung lesion found on lung cancer screening CT. Patient's team is requesting CT-guided percutaneous biopsy.    I have reviewed the patient data and determined that the requested  procedure is indicated, technically achievable, and that the patient is a candidate for moderate sedation.     Proceed with scheduling for CT guided biopsy of YANNA lung lesion (CT 10/1/20, S 4, I 106) in Interventional Radiology at the [facility].     Thank you for the consultation and for letting Interventional Radiology help you care for your patient.    Sincerely,    Lc Araiza PA-C, [facility]  Physician Assistant  Interventional Radiology  [phone] (pager)  [phone] (IR control)    =====      CC:  Patient Care Team

## 2020-10-27 ENCOUNTER — TELEPHONE (OUTPATIENT)
Dept: INTERVENTIONAL RADIOLOGY/VASCULAR | Facility: CLINIC | Age: 70
End: 2020-10-27

## 2020-10-27 DIAGNOSIS — R91.1 LUNG NODULE: Primary | ICD-10-CM

## 2020-11-11 ENCOUNTER — TELEPHONE (OUTPATIENT)
Dept: FAMILY MEDICINE | Facility: CLINIC | Age: 70
End: 2020-11-11

## 2020-11-11 NOTE — TELEPHONE ENCOUNTER
----- Message from Lias Merino NP sent at 11/11/2020  1:05 PM CST -----  Can you try to reach pt and have her contact surgeon's office to get the needle biopsy scheduled please?  ----- Message -----  From: Daysi Fall APRN CNS  Sent: 11/11/2020  11:21 AM CST  To: Lisa Merino NP, #    Hello,    I work with Dr. Sara Monzon in Thoracic Surgery. She saw Shania who you had referred to us. We recommended a needle biopsy in IR and they have reached out to get her scheduled but she has not called them back.    I wonder, since you have an established relationship with her, if you would be able to give her a call about this?  Thanks!Daysi

## 2020-11-12 NOTE — TELEPHONE ENCOUNTER
Pt notified to make appointment with:  Reach out to pt to schedule proc. msg left for patient to contact IR Scheduling at [phone].

## 2020-11-13 ENCOUNTER — TELEPHONE (OUTPATIENT)
Dept: INTERVENTIONAL RADIOLOGY/VASCULAR | Facility: CLINIC | Age: 70
End: 2020-11-13

## 2020-11-19 ENCOUNTER — VIRTUAL VISIT (OUTPATIENT)
Dept: FAMILY MEDICINE | Facility: CLINIC | Age: 70
End: 2020-11-19
Payer: COMMERCIAL

## 2020-11-19 DIAGNOSIS — R91.1 LUNG NODULE: ICD-10-CM

## 2020-11-19 DIAGNOSIS — G89.4 CHRONIC PAIN SYNDROME: Primary | ICD-10-CM

## 2020-11-19 DIAGNOSIS — Z11.59 ENCOUNTER FOR SCREENING FOR OTHER VIRAL DISEASES: Primary | ICD-10-CM

## 2020-11-19 PROCEDURE — 99443 PR PHYSICIAN TELEPHONE EVALUATION 21-30 MIN: CPT | Mod: 95 | Performed by: NURSE PRACTITIONER

## 2020-11-19 RX ORDER — OXYCODONE HYDROCHLORIDE 5 MG/1
TABLET ORAL
Qty: 150 TABLET | Refills: 0 | Status: SHIPPED | OUTPATIENT
Start: 2021-01-29 | End: 2021-02-25

## 2020-11-19 RX ORDER — OXYCODONE HYDROCHLORIDE 5 MG/1
TABLET ORAL
Qty: 150 TABLET | Refills: 0 | Status: SHIPPED | OUTPATIENT
Start: 2020-12-30 | End: 2020-11-19

## 2020-11-19 RX ORDER — OXYCODONE HYDROCHLORIDE 5 MG/1
TABLET ORAL
Qty: 150 TABLET | Refills: 0 | Status: SHIPPED | OUTPATIENT
Start: 2020-11-30 | End: 2020-11-19

## 2020-11-19 NOTE — PROGRESS NOTES
"Shania Holliday is a 70 year old female who is being evaluated via a billable telephone visit.      The patient has been notified of following:     \"This telephone visit will be conducted via a call between you and your physician/provider. We have found that certain health care needs can be provided without the need for a physical exam.  This service lets us provide the care you need with a short phone conversation.  If a prescription is necessary we can send it directly to your pharmacy.  If lab work is needed we can place an order for that and you can then stop by our lab to have the test done at a later time.    Telephone visits are billed at different rates depending on your insurance coverage. During this emergency period, for some insurers they may be billed the same as an in-person visit.  Please reach out to your insurance provider with any questions.    If during the course of the call the physician/provider feels a telephone visit is not appropriate, you will not be charged for this service.\"    Patient has given verbal consent for Telephone visit?  Yes    What phone number would you like to be contacted at? [phone]    How would you like to obtain your AVS? Mail a copy    Subjective     Shania Holliday is a 70 year old female who presents via phone visit today for the following health issues:    HPI     Patient would like to get a refill on oxycodone.    Her pain is stable on 5 oxycodone a day and denies any side effects.    She is scheduled for IR lung biopsy for a suspicious nodule on 12/14..  She is smoking.               Review of Systems   CONSTITUTIONAL: NEGATIVE for fever, chills, change in weight  ENT/MOUTH: NEGATIVE for ear, mouth and throat problems  RESP: NEGATIVE for significant cough or SOB  CV: NEGATIVE for chest pain, palpitations or peripheral edema  GI: NEGATIVE for nausea, abdominal pain, heartburn, or change in bowel habits  MUSCULOSKELETAL: see HPI  NEURO: NEGATIVE for weakness, " dizziness or paresthesias  PSYCHIATRIC: NEGATIVE for changes in mood or affect       Objective          Vitals:  No vitals were obtained today due to virtual visit.    healthy, alert and no distress  PSYCH: Alert and oriented times 3; coherent speech, normal   rate and volume, able to articulate logical thoughts, able   to abstract reason, no tangential thoughts, no hallucinations   or delusions  Her affect is normal  RESP: No cough, no audible wheezing, able to talk in full sentences  Remainder of exam unable to be completed due to telephone visits            Assessment/Plan:    Assessment & Plan     Chronic pain syndrome  Monitoring of high risk medication.  Pain is stable.  The current medical regimen is effective;  continue present plan and medications.   Refills given for 3 months.  - oxyCODONE (ROXICODONE) 5 MG tablet; Take 1-2 tablets by mouth every 4-6 hours as needed for pain  Must last 30 days    Lung nodule  SHe will follow up with scheduled biopsy.  Encouraged smoking cessation.               Return in about 3 months (around 2/19/2021).    Lisa Merino NP  [facility]    Phone call duration:  21 minutes

## 2020-12-08 ENCOUNTER — TELEPHONE (OUTPATIENT)
Dept: INTERVENTIONAL RADIOLOGY/VASCULAR | Facility: CLINIC | Age: 70
End: 2020-12-08

## 2020-12-11 DIAGNOSIS — Z11.59 ENCOUNTER FOR SCREENING FOR OTHER VIRAL DISEASES: ICD-10-CM

## 2020-12-11 LAB
SARS-COV-2 RNA SPEC QL NAA+PROBE: NOT DETECTED
SPECIMEN SOURCE: NORMAL

## 2020-12-11 PROCEDURE — U0003 INFECTIOUS AGENT DETECTION BY NUCLEIC ACID (DNA OR RNA); SEVERE ACUTE RESPIRATORY SYNDROME CORONAVIRUS 2 (SARS-COV-2) (CORONAVIRUS DISEASE [COVID-19]), AMPLIFIED PROBE TECHNIQUE, MAKING USE OF HIGH THROUGHPUT TECHNOLOGIES AS DESCRIBED BY CMS-2020-01-R: HCPCS | Performed by: STUDENT IN AN ORGANIZED HEALTH CARE EDUCATION/TRAINING PROGRAM

## 2020-12-14 ENCOUNTER — APPOINTMENT (OUTPATIENT)
Dept: GENERAL RADIOLOGY | Facility: CLINIC | Age: 70
End: 2020-12-14
Attending: STUDENT IN AN ORGANIZED HEALTH CARE EDUCATION/TRAINING PROGRAM
Payer: COMMERCIAL

## 2020-12-14 ENCOUNTER — HOSPITAL ENCOUNTER (OUTPATIENT)
Facility: CLINIC | Age: 70
Discharge: HOME | End: 2020-12-14
Attending: STUDENT IN AN ORGANIZED HEALTH CARE EDUCATION/TRAINING PROGRAM | Admitting: RADIOLOGY
Payer: COMMERCIAL

## 2020-12-14 ENCOUNTER — APPOINTMENT (OUTPATIENT)
Dept: MEDSURG UNIT | Facility: CLINIC | Age: 70
End: 2020-12-14
Attending: STUDENT IN AN ORGANIZED HEALTH CARE EDUCATION/TRAINING PROGRAM
Payer: COMMERCIAL

## 2020-12-14 ENCOUNTER — APPOINTMENT (OUTPATIENT)
Dept: INTERVENTIONAL RADIOLOGY/VASCULAR | Facility: CLINIC | Age: 70
End: 2020-12-14
Attending: PHYSICIAN ASSISTANT
Payer: COMMERCIAL

## 2020-12-14 VITALS
HEART RATE: 72 BPM | WEIGHT: 155 LBS | TEMPERATURE: 97.8 F | RESPIRATION RATE: 16 BRPM | DIASTOLIC BLOOD PRESSURE: 79 MMHG | SYSTOLIC BLOOD PRESSURE: 136 MMHG | OXYGEN SATURATION: 98 % | BODY MASS INDEX: 29.29 KG/M2

## 2020-12-14 DIAGNOSIS — R91.1 LUNG NODULE: ICD-10-CM

## 2020-12-14 DIAGNOSIS — R91.8 PULMONARY NODULES: ICD-10-CM

## 2020-12-14 LAB
ERYTHROCYTE [DISTWIDTH] IN BLOOD BY AUTOMATED COUNT: 13.4 % (ref 10–15)
HCT VFR BLD AUTO: 36.6 % (ref 35–47)
HGB BLD-MCNC: 12 G/DL (ref 11.7–15.7)
INR PPP: 0.99 (ref 0.86–1.14)
INTERPRETATION ECG - MUSE: NORMAL
MCH RBC QN AUTO: 29.5 PG (ref 26.5–33)
MCHC RBC AUTO-ENTMCNC: 32.8 G/DL (ref 31.5–36.5)
MCV RBC AUTO: 90 FL (ref 78–100)
PLATELET # BLD AUTO: 353 10E9/L (ref 150–450)
RBC # BLD AUTO: 4.07 10E12/L (ref 3.8–5.2)
WBC # BLD AUTO: 8.9 10E9/L (ref 4–11)

## 2020-12-14 PROCEDURE — 99152 MOD SED SAME PHYS/QHP 5/>YRS: CPT

## 2020-12-14 PROCEDURE — 250N000009 HC RX 250: Performed by: STUDENT IN AN ORGANIZED HEALTH CARE EDUCATION/TRAINING PROGRAM

## 2020-12-14 PROCEDURE — 77012 CT SCAN FOR NEEDLE BIOPSY: CPT | Mod: 26 | Performed by: RADIOLOGY

## 2020-12-14 PROCEDURE — 88342 IMHCHEM/IMCYTCHM 1ST ANTB: CPT | Mod: TC | Performed by: CLINICAL NURSE SPECIALIST

## 2020-12-14 PROCEDURE — 93005 ELECTROCARDIOGRAM TRACING: CPT

## 2020-12-14 PROCEDURE — 85610 PROTHROMBIN TIME: CPT | Mod: GZ | Performed by: RADIOLOGY

## 2020-12-14 PROCEDURE — 85027 COMPLETE CBC AUTOMATED: CPT | Performed by: RADIOLOGY

## 2020-12-14 PROCEDURE — 999N000065 XR CHEST 1 VW

## 2020-12-14 PROCEDURE — 88312 SPECIAL STAINS GROUP 1: CPT | Mod: TC | Performed by: CLINICAL NURSE SPECIALIST

## 2020-12-14 PROCEDURE — 99152 MOD SED SAME PHYS/QHP 5/>YRS: CPT | Performed by: RADIOLOGY

## 2020-12-14 PROCEDURE — 88341 IMHCHEM/IMCYTCHM EA ADD ANTB: CPT | Mod: 26 | Performed by: PATHOLOGY

## 2020-12-14 PROCEDURE — 77012 CT SCAN FOR NEEDLE BIOPSY: CPT

## 2020-12-14 PROCEDURE — 88342 IMHCHEM/IMCYTCHM 1ST ANTB: CPT | Mod: 26 | Performed by: PATHOLOGY

## 2020-12-14 PROCEDURE — 999N000133 HC STATISTIC PP CARE STAGE 2

## 2020-12-14 PROCEDURE — 32405 CT LUNG MEDIASTINUM BIOPSY: CPT | Mod: LT | Performed by: RADIOLOGY

## 2020-12-14 PROCEDURE — 250N000011 HC RX IP 250 OP 636: Performed by: STUDENT IN AN ORGANIZED HEALTH CARE EDUCATION/TRAINING PROGRAM

## 2020-12-14 PROCEDURE — 999N000054 HC STATISTIC EKG NON-CHARGEABLE

## 2020-12-14 PROCEDURE — 88305 TISSUE EXAM BY PATHOLOGIST: CPT | Mod: TC | Performed by: CLINICAL NURSE SPECIALIST

## 2020-12-14 PROCEDURE — 88305 TISSUE EXAM BY PATHOLOGIST: CPT | Mod: 26 | Performed by: PATHOLOGY

## 2020-12-14 PROCEDURE — 88312 SPECIAL STAINS GROUP 1: CPT | Mod: 26 | Performed by: PATHOLOGY

## 2020-12-14 PROCEDURE — 71045 X-RAY EXAM CHEST 1 VIEW: CPT | Mod: 26 | Performed by: RADIOLOGY

## 2020-12-14 PROCEDURE — 88341 IMHCHEM/IMCYTCHM EA ADD ANTB: CPT | Mod: TC | Performed by: CLINICAL NURSE SPECIALIST

## 2020-12-14 PROCEDURE — 93010 ELECTROCARDIOGRAM REPORT: CPT | Mod: 59 | Performed by: INTERNAL MEDICINE

## 2020-12-14 PROCEDURE — 272N000506 HC NEEDLE CR6

## 2020-12-14 RX ORDER — NALOXONE HYDROCHLORIDE 0.4 MG/ML
0.2 INJECTION, SOLUTION INTRAMUSCULAR; INTRAVENOUS; SUBCUTANEOUS
Status: DISCONTINUED | OUTPATIENT
Start: 2020-12-14 | End: 2020-12-14 | Stop reason: HOSPADM

## 2020-12-14 RX ORDER — NICOTINE POLACRILEX 4 MG
15-30 LOZENGE BUCCAL
Status: DISCONTINUED | OUTPATIENT
Start: 2020-12-14 | End: 2020-12-14 | Stop reason: HOSPADM

## 2020-12-14 RX ORDER — FLUMAZENIL 0.1 MG/ML
0.2 INJECTION, SOLUTION INTRAVENOUS
Status: DISCONTINUED | OUTPATIENT
Start: 2020-12-14 | End: 2020-12-14 | Stop reason: HOSPADM

## 2020-12-14 RX ORDER — NALOXONE HYDROCHLORIDE 0.4 MG/ML
0.4 INJECTION, SOLUTION INTRAMUSCULAR; INTRAVENOUS; SUBCUTANEOUS
Status: DISCONTINUED | OUTPATIENT
Start: 2020-12-14 | End: 2020-12-14 | Stop reason: HOSPADM

## 2020-12-14 RX ORDER — DEXTROSE MONOHYDRATE 25 G/50ML
25-50 INJECTION, SOLUTION INTRAVENOUS
Status: DISCONTINUED | OUTPATIENT
Start: 2020-12-14 | End: 2020-12-14 | Stop reason: HOSPADM

## 2020-12-14 RX ORDER — LIDOCAINE 40 MG/G
CREAM TOPICAL
Status: DISCONTINUED | OUTPATIENT
Start: 2020-12-14 | End: 2020-12-14 | Stop reason: HOSPADM

## 2020-12-14 RX ORDER — FENTANYL CITRATE 50 UG/ML
25-50 INJECTION, SOLUTION INTRAMUSCULAR; INTRAVENOUS EVERY 5 MIN PRN
Status: DISCONTINUED | OUTPATIENT
Start: 2020-12-14 | End: 2020-12-14 | Stop reason: HOSPADM

## 2020-12-14 RX ORDER — SODIUM CHLORIDE 9 MG/ML
INJECTION, SOLUTION INTRAVENOUS CONTINUOUS
Status: DISCONTINUED | OUTPATIENT
Start: 2020-12-14 | End: 2020-12-14 | Stop reason: HOSPADM

## 2020-12-14 RX ADMIN — MIDAZOLAM 1 MG: 1 INJECTION INTRAMUSCULAR; INTRAVENOUS at 10:49

## 2020-12-14 RX ADMIN — LIDOCAINE HYDROCHLORIDE 4 ML: 10 INJECTION, SOLUTION EPIDURAL; INFILTRATION; INTRACAUDAL; PERINEURAL at 11:04

## 2020-12-14 RX ADMIN — MIDAZOLAM 0.5 MG: 1 INJECTION INTRAMUSCULAR; INTRAVENOUS at 10:58

## 2020-12-14 RX ADMIN — FENTANYL CITRATE 50 MCG: 50 INJECTION, SOLUTION INTRAMUSCULAR; INTRAVENOUS at 10:49

## 2020-12-14 RX ADMIN — FENTANYL CITRATE 25 MCG: 50 INJECTION, SOLUTION INTRAMUSCULAR; INTRAVENOUS at 10:57

## 2020-12-14 RX ADMIN — MIDAZOLAM 0.5 MG: 1 INJECTION INTRAMUSCULAR; INTRAVENOUS at 11:03

## 2020-12-14 RX ADMIN — FENTANYL CITRATE 25 MCG: 50 INJECTION, SOLUTION INTRAMUSCULAR; INTRAVENOUS at 11:03

## 2020-12-14 NOTE — PROGRESS NOTES
Pt to unit 2a for lung biopsy. PIV in place, labs pending, IVF infusing, consenting for procedure at this time.

## 2020-12-14 NOTE — IR NOTE
Patient Name: Shania Holliday  Medical Record Number: [id number]  Today's Date: 12/14/2020    Procedure: Image guided left lung nodule biopsy  Proceduralist: Dr. Clark    Procedure Start: 1050  Procedure end: 1112  Sedation medications administered: 100 mcg fentanyl and 2 mg versed    Report given to: Edelmira Elizabeth RN 2A  : ARMAAN    Other Notes: Pt arrived to IR room CT 2 from . Consent reviewed. Pt denies any questions or concerns regarding procedure. Pt positioned supine and monitored per protocol. Pt tolerated procedure without any noted complications. Pt transferred back to .

## 2020-12-14 NOTE — PROGRESS NOTES
Per Dr. Clark, follow-up chest xray looks good, pt okay to discharge home. Site appears clean, dry, intact, and soft. Pt denies pain. Discharge teaching completed with all questions answered and copy to pt. Tolerates PO intake (food and fluids) well. Ambulates steady on feet and denies pain or dizziness when standing. PIV removed with catheter intact.

## 2020-12-14 NOTE — IP AVS SNAPSHOT
[facility] Interventional Radiology  [address] [address]  Phone: [phone]                                    After Visit Summary   12/14/2020    Shania Holliday    MRN: [id number]           After Visit Summary Signature Page    I have received my discharge instructions, and my questions have been answered. I have discussed any challenges I see with this plan with the nurse or doctor.    ..........................................................................................................................................  Patient/Patient Representative Signature      ..........................................................................................................................................  Patient Representative Print Name and Relationship to Patient    ..................................................               ................................................  Date                                   Time    ..........................................................................................................................................  Reviewed by Signature/Title    ...................................................              ..............................................  Date                                               Time          22EPIC Rev 08/18

## 2020-12-14 NOTE — DISCHARGE INSTRUCTIONS
[facility]    Interventional Radiology  Patient Instructions Following Lung Biopsy    AFTER YOU GO HOME  ? If you were given sedation DO NOT drive or operate machinery at home or at work for at least 24 hours  ? DO relax and take it easy for 48 hours, no strenuous activity for 24 hours  ? DO drink plenty of fluids  ? DO resume your regular diet, unless otherwise instructed by your Primary Physician  ? Keep the dressing dry and in place for 24 hours.  ? DO NOT SMOKE FOR AT LEAST 24 HOURS, if you have been given any medications that were to help you relax or sedate you during your procedure  ? DO NOT drink alcoholic beverages the day of your procedure  ? DO NOT do any strenuous exercise or lifting (> 10 lbs) for at least 7 days following your procedure  ? DO NOT take a bath or shower for at least 12 hours following your procedure  ? Remove dressing after shower the next day. Replace with Band aid for 2 days.  Never leave a wet dressing in place.  ? DO NOT make any important or legal decisions for 24 hours following your procedure  ? There should be minimum drainage from the biopsy site    CALL THE PHYSICIAN IF:  ? You start bleeding from the procedure site.  If you do start to bleed from that site, lie down flat and hold pressure on the site for a minimum of 10 minutes.  Your physician will tell you if you need to return to the hospital  ? You develop nausea or vomiting  ? You have excessive swelling, redness, or tenderness at the site  ? You have drainage that looks like it is infected.  ? You experience severe pain  ? You develop hives or a rash or unexplained itching  ? You develop shortness of breath  ? You develop a temperature of 101 degrees F or greater  ? You develop chest pain or cough up blood, lightheadedness or fainting      [facility] INTERVENTIONAL RADIOLOGY DEPARTMENT  Procedure Physician:         Dr. Clark                            Date of procedure: December 14, 2020  Telephone  Numbers: [phone] Monday-Friday 8:00 am to 4:30 pm  [phone] After 4:30 pm Monday-Friday, Weekends & Holidays.   Ask for the Interventional Radiologist on call.  Someone is on call 24 hrs/day  [facility] toll free number: [phone] Monday-Friday 8:00 am to 4:30 pm  [facility] Emergency Dept: [phone]

## 2020-12-14 NOTE — IP AVS SNAPSHOT
MRN:[id number]                      After Visit Summary   12/14/2020    Shania Holliday    MRN: [id number]           Visit Information        Department      12/14/2020  7:52 AM [facility] Interventional Radiology          Review of your medicines      UNREVIEWED medicines. Ask your doctor about these medicines       Dose / Directions   albuterol 108 (90 Base) MCG/ACT inhaler  Commonly known as: ProAir HFA  Used for: Tobacco abuse      INHALE 2 PUFFS INTO THE LUNGS EVERY 6 HOURS AS NEEDED FOR SHORTNESS OF BREATH / DYSPNEA OR WHEEZING  Quantity: 8.5 g  Refills: PRN     Aspercreme 10 % external cream  Generic drug: trolamine salicylate      daily use x4 times  Refills: 0     aspirin 325 MG EC tablet  Commonly known as: ASA      2 TABLET daily  Refills: 0     CALCIUM + D PO      BID  Refills: 0     cetirizine HCl 10 MG Caps  Commonly known as: ZyrTEC Allergy  Used for: Allergic rhinitis due to other allergen      Dose: 10 mg  Take 10 mg by mouth daily Take  by mouth.  Quantity: 90 capsule  Refills: PRN     fluticasone 50 MCG/ACT nasal spray  Commonly known as: FLONASE      Dose: 2 spray  Spray 2 sprays into both nostrils as needed  Refills: 0     HAIR/SKIN/NAILS PO      Dose: 2 capsule  Take 2 capsules by mouth daily  Refills: 0     hydrOXYzine 25 MG tablet  Commonly known as: ATARAX  Used for: Anxiety      TAKE TWO TABLETS BY MOUTH DAILY AT BEDTIME AS NEEDED FOR ANXIETY  Quantity: 180 tablet  Refills: PRN     methocarbamol 750 MG tablet  Commonly known as: ROBAXIN  Used for: Chronic pain syndrome      Dose: 750 mg  Take 1 tablet (750 mg) by mouth every 6 hours as needed for muscle spasms  Quantity: 180 tablet  Refills: PRN     Multivitamin Tabs      Take one daily  Refills: 0     nicotine 2 MG gum  Commonly known as: Nicorette Starter Kit  Used for: Tobacco abuse      Dose: 2 mg  Place 1 each (2 mg) inside cheek as needed for smoking cessation  Quantity: 30 tablet  Refills: PRN     oxyCODONE  5 MG tablet  Commonly known as: ROXICODONE  Used for: Chronic pain syndrome      Start taking on: January 29, 2021  Take 1-2 tablets by mouth every 4-6 hours as needed for pain  Must last 30 days  Quantity: 150 tablet  Refills: 0     pantoprazole 40 MG EC tablet  Commonly known as: Protonix  Used for: Gastroesophageal reflux disease without esophagitis      Dose: 40 mg  Take 1 tablet (40 mg) by mouth daily  Quantity: 90 tablet  Refills: 3     PARoxetine 20 MG tablet  Commonly known as: Paxil  Used for: Major depressive disorder, recurrent, in full remission (H)      Dose: 10 mg  Take 0.5 tablets (10 mg) by mouth daily  Quantity: 45 tablet  Refills: 3     simvastatin 20 MG tablet  Commonly known as: ZOCOR  Used for: Hyperlipidemia LDL goal <130      Take  by mouth. 1 TABLET AT BEDTIME  Quantity: 90 tablet  Refills: 3              Protect others around you: Learn how to safely use, store and throw away your medicines at www.disposemymeds.org.       Follow-ups after your visit       Care Instructions       Further instructions from your care team       [facility]    Interventional Radiology  Patient Instructions Following Lung Biopsy    AFTER YOU GO HOME  ? If you were given sedation DO NOT drive or operate machinery at home or at work for at least 24 hours  ? DO relax and take it easy for 48 hours, no strenuous activity for 24 hours  ? DO drink plenty of fluids  ? DO resume your regular diet, unless otherwise instructed by your Primary Physician  ? Keep the dressing dry and in place for 24 hours.  ? DO NOT SMOKE FOR AT LEAST 24 HOURS, if you have been given any medications that were to help you relax or sedate you during your procedure  ? DO NOT drink alcoholic beverages the day of your procedure  ? DO NOT do any strenuous exercise or lifting (> 10 lbs) for at least 7 days following your procedure  ? DO NOT take a bath or shower for at least 12 hours following your procedure  ? Remove dressing  after shower the next day. Replace with Band aid for 2 days.  Never leave a wet dressing in place.  ? DO NOT make any important or legal decisions for 24 hours following your procedure  ? There should be minimum drainage from the biopsy site    CALL THE PHYSICIAN IF:  ? You start bleeding from the procedure site.  If you do start to bleed from that site, lie down flat and hold pressure on the site for a minimum of 10 minutes.  Your physician will tell you if you need to return to the hospital  ? You develop nausea or vomiting  ? You have excessive swelling, redness, or tenderness at the site  ? You have drainage that looks like it is infected.  ? You experience severe pain  ? You develop hives or a rash or unexplained itching  ? You develop shortness of breath  ? You develop a temperature of 101 degrees F or greater  ? You develop chest pain or cough up blood, lightheadedness or fainting      [facility] INTERVENTIONAL RADIOLOGY DEPARTMENT  Procedure Physician:         Dr. Clark                            Date of procedure: December 14, 2020  Telephone Numbers: [phone] Monday-Friday 8:00 am to 4:30 pm  [phone] After 4:30 pm Monday-Friday, Weekends & Holidays.   Ask for the Interventional Radiologist on call.  Someone is on call 24 hrs/day  [facility] toll free number: [phone] Monday-Friday 8:00 am to 4:30 pm  [facility] Emergency Dept: [phone]          Additional Information About Your Visit       Care EveryWhere ID    This is your Care EveryWhere ID. This could be used by other organizations to access your [facility] medical records  [id number]       Your Vitals Were  Most recent update: 12/14/2020 12:32 PM    Blood Pressure   138/70          Pulse   71          Temperature   97.8  F (36.6  C) (Oral)          Respirations   16          Weight   70.3 kg (155 lb)             Pulse Oximetry   98%    BMI (Body Mass Index)   29.29 kg/m           Primary Care Provider Office Phone # Fax #    Lisa Merino NP  [phone]      Equal Access to Services    NANO HERRERABARBIE : Hadii aad ku hadmilagrosadam Blackwood, warylandda ryann, qachristojonah choigaysharona siu, aakash wattsdeomich patricio. So [facility] [phone].    ATENCIÓN: Si habla español, tiene a sherwood disposición servicios gratuitos de asistencia lingüística. Llame al [phone].    We comply with applicable federal and state civil rights laws, including the [place] Human Rights Act. We do not discriminate on the basis of race, color, creed, Rastafarian, national origin, marital status, age, disability, sex, sexual orientation, or gender identity.       Thank you!    Thank you for choosing [facility] for your care. Our goal is always to provide you with excellent care. Hearing back from our patients is one way we can continue to improve our services. Please take a few minutes to complete the written survey that you may receive in the mail after you visit with us. Thank you!            Medication List      ASK your doctor about these medications          Morning Afternoon Evening Bedtime As Needed    albuterol 108 (90 Base) MCG/ACT inhaler  Also known as: ProAir HFA  INSTRUCTIONS: INHALE 2 PUFFS INTO THE LUNGS EVERY 6 HOURS AS NEEDED FOR SHORTNESS OF BREATH / DYSPNEA OR WHEEZING  Doctor's comments: Pharmacy may dispense brand covered by insurance (Proair, or proventil or ventolin or generic albuterol inhaler)                     Aspercreme 10 % external cream  INSTRUCTIONS: daily use x4 times  Generic drug: trolamine salicylate                     aspirin 325 MG EC tablet  Also known as: ASA  INSTRUCTIONS: 2 TABLET daily                     CALCIUM + D PO  INSTRUCTIONS: BID                     cetirizine HCl 10 MG Caps  Also known as: ZyrTEC Allergy  INSTRUCTIONS: Take 10 mg by mouth daily Take  by mouth.                     fluticasone 50 MCG/ACT nasal spray  Also known as: FLONASE  INSTRUCTIONS: Spray 2 sprays into both nostrils as needed                      HAIR/SKIN/NAILS PO  INSTRUCTIONS: Take 2 capsules by mouth daily                     hydrOXYzine 25 MG tablet  Also known as: ATARAX  INSTRUCTIONS: TAKE TWO TABLETS BY MOUTH DAILY AT BEDTIME AS NEEDED FOR ANXIETY                     methocarbamol 750 MG tablet  Also known as: ROBAXIN  INSTRUCTIONS: Take 1 tablet (750 mg) by mouth every 6 hours as needed for muscle spasms  Doctor's comments: Keep on file                     Multivitamin Tabs  INSTRUCTIONS: Take one daily                     nicotine 2 MG gum  Also known as: Nicorette Starter Kit  INSTRUCTIONS: Place 1 each (2 mg) inside cheek as needed for smoking cessation                     oxyCODONE 5 MG tablet  Also known as: ROXICODONE  Start taking on: January 29, 2021  INSTRUCTIONS: Take 1-2 tablets by mouth every 4-6 hours as needed for pain  Must last 30 days                     pantoprazole 40 MG EC tablet  Also known as: Protonix  INSTRUCTIONS: Take 1 tablet (40 mg) by mouth daily  Doctor's comments: Keep on file                     PARoxetine 20 MG tablet  Also known as: Paxil  INSTRUCTIONS: Take 0.5 tablets (10 mg) by mouth daily  Doctor's comments: Keep on file                     simvastatin 20 MG tablet  Also known as: ZOCOR  INSTRUCTIONS: Take  by mouth. 1 TABLET AT BEDTIME

## 2020-12-14 NOTE — PROGRESS NOTES
Pt arrived on 2a post lung biopsy. VSS RA. Dressing c/d/i. No pain at site. Pt eating and drinking.

## 2020-12-16 ENCOUNTER — TELEPHONE (OUTPATIENT)
Dept: SURGERY | Facility: CLINIC | Age: 70
End: 2020-12-16

## 2020-12-16 DIAGNOSIS — D86.9 SARCOIDOSIS: Primary | ICD-10-CM

## 2020-12-16 LAB — COPATH REPORT: NORMAL

## 2020-12-16 NOTE — TELEPHONE ENCOUNTER
Call to Shania to discuss pathology (benign). There was no answer. Message left with call back information.

## 2020-12-18 NOTE — TELEPHONE ENCOUNTER
Shania returned my call. I reviewed pathology with her and she was very relieved that it is not cancer. We talked about granulomas and what they represent. She is not displaying symptoms of sarcoidosis so there is unlikely any treatment that would be recommended however, Dr. Monzon does want her to see one of our Pulmonary Medicine providers in 3 months with a new chest CT.    Shania verbalized agreement with and understanding of the plan and will expect a call from scheduling.    She appreciated my call and had no further questions at this time.

## 2020-12-18 NOTE — ADDENDUM NOTE
Addended by: SCAR OLVERA on: 12/18/2020 09:56 AM     Modules accepted: Orders     Received ambulatory accompanied by RN. Handoff reported by JAROCHO Li RN. Patient awake, alert and verbally responsive. Tolerated fluids well.  . Patient able to  move all extremities without difficulty voluntarily and on command. Reviewed home care instructions and understood by patient. .  evaluated patient. There's no evidence of procedural complication noted.

## 2020-12-23 ENCOUNTER — TELEPHONE (OUTPATIENT)
Dept: PULMONOLOGY | Facility: CLINIC | Age: 70
End: 2020-12-23

## 2020-12-23 NOTE — TELEPHONE ENCOUNTER
----- Message from Radha Dixon, RN sent at 12/21/2020  9:30 AM CST -----  New sarcoid, needs appointment in 3 months per note from Dr Muniz office. Will need CT scan and Full PFT and walk.    Let me know who you find to schedule her with and I can place the orders!    Thanks  Melissa  ----- Message -----  From: Darron Mejia  Sent: 12/18/2020  10:48 AM CST  To: [facility] Nurses-    PULMONARY MEDICINE REFERRAL  Sarcoidosis [D86.9]  Daysi Fall APRN CNS in  [facility]

## 2021-02-25 ENCOUNTER — VIRTUAL VISIT (OUTPATIENT)
Dept: FAMILY MEDICINE | Facility: CLINIC | Age: 71
End: 2021-02-25
Payer: COMMERCIAL

## 2021-02-25 VITALS
HEART RATE: 81 BPM | SYSTOLIC BLOOD PRESSURE: 128 MMHG | OXYGEN SATURATION: 98 % | BODY MASS INDEX: 29.29 KG/M2 | TEMPERATURE: 97 F | WEIGHT: 155 LBS | DIASTOLIC BLOOD PRESSURE: 84 MMHG

## 2021-02-25 DIAGNOSIS — F11.90 CHRONIC, CONTINUOUS USE OF OPIOIDS: ICD-10-CM

## 2021-02-25 DIAGNOSIS — J43.9 PULMONARY EMPHYSEMA, UNSPECIFIED EMPHYSEMA TYPE (H): ICD-10-CM

## 2021-02-25 DIAGNOSIS — F33.42 MAJOR DEPRESSIVE DISORDER, RECURRENT, IN FULL REMISSION (H): ICD-10-CM

## 2021-02-25 DIAGNOSIS — G89.4 CHRONIC PAIN SYNDROME: Primary | ICD-10-CM

## 2021-02-25 PROBLEM — F11.20 CONTINUOUS OPIOID DEPENDENCE (H): Status: RESOLVED | Noted: 2018-07-19 | Resolved: 2021-02-25

## 2021-02-25 PROCEDURE — 99443 PR PHYSICIAN TELEPHONE EVALUATION 21-30 MIN: CPT | Mod: 95 | Performed by: NURSE PRACTITIONER

## 2021-02-25 RX ORDER — OXYCODONE HYDROCHLORIDE 5 MG/1
5-10 TABLET ORAL EVERY 6 HOURS PRN
Qty: 150 TABLET | Refills: 0 | Status: SHIPPED | OUTPATIENT
Start: 2021-02-27 | End: 2021-05-26

## 2021-02-25 RX ORDER — OXYCODONE HYDROCHLORIDE 5 MG/1
5-10 TABLET ORAL EVERY 6 HOURS PRN
Qty: 150 TABLET | Refills: 0 | Status: SHIPPED | OUTPATIENT
Start: 2021-03-29 | End: 2021-05-26

## 2021-02-25 RX ORDER — OXYCODONE AND ACETAMINOPHEN 5; 325 MG/1; MG/1
1-2 TABLET ORAL EVERY 6 HOURS PRN
Qty: 150 TABLET | Refills: 0 | Status: SHIPPED | OUTPATIENT
Start: 2021-04-28 | End: 2021-05-26

## 2021-02-25 ASSESSMENT — ANXIETY QUESTIONNAIRES
7. FEELING AFRAID AS IF SOMETHING AWFUL MIGHT HAPPEN: NOT AT ALL
1. FEELING NERVOUS, ANXIOUS, OR ON EDGE: NOT AT ALL
6. BECOMING EASILY ANNOYED OR IRRITABLE: NOT AT ALL
3. WORRYING TOO MUCH ABOUT DIFFERENT THINGS: NOT AT ALL
2. NOT BEING ABLE TO STOP OR CONTROL WORRYING: NOT AT ALL
GAD7 TOTAL SCORE: 0
5. BEING SO RESTLESS THAT IT IS HARD TO SIT STILL: NOT AT ALL

## 2021-02-25 ASSESSMENT — PATIENT HEALTH QUESTIONNAIRE - PHQ9
SUM OF ALL RESPONSES TO PHQ QUESTIONS 1-9: 2
5. POOR APPETITE OR OVEREATING: NOT AT ALL

## 2021-02-25 NOTE — PROGRESS NOTES
"Shania is a 70 year old who is being evaluated via a billable telephone visit.      What phone number would you like to be contacted at? [phone]  How would you like to obtain your AVS? MyChart    Assessment & Plan     Chronic pain syndrome  Monitoring of high risk medication.   checked.  Refills given for 3 months.  Follow up in 3 months.   - oxyCODONE-acetaminophen (PERCOCET) 5-325 MG tablet; Take 1-2 tablets by mouth every 6 hours as needed for pain  - oxyCODONE (ROXICODONE) 5 MG tablet; Take 1-2 tablets (5-10 mg) by mouth every 6 hours as needed for pain  - oxyCODONE (ROXICODONE) 5 MG tablet; Take 1-2 tablets (5-10 mg) by mouth every 6 hours as needed for pain    Chronic, continuous use of opioids      Pulmonary emphysema, unspecified emphysema type (H)  She will follow up for 3 month CT scan.     Major depressive disorder, recurrent, in full remission (H)  In remission  The current medical regimen is effective;  continue present plan and medications.         30 minutes spent on the date of the encounter doing patient visit and documentation        BMI:   Estimated body mass index is 29.29 kg/m  as calculated from the following:    Height as of 9/1/20: 1.549 m (5' 1\").    Weight as of this encounter: 70.3 kg (155 lb).           Return in about 3 months (around 5/25/2021).    Lisa Merino NP  [facility]    Michael Jauregui is a 70 year old who presents for the following health issues     HPI        Chronic Pain Follow-Up    Where in your body do you have pain? Chronic pain   How has your pain affected your ability to work? Disability   Which of these pain treatments have you tried since your last clinic visit? No   How well are you sleeping? Fair  How has your mood been since your last visit? Good   Have you had a significant life event? No   Other aggravating factors: none  Taking medication as directed? Yes    PHQ-9 SCORE 2/13/2020 8/12/2020 2/25/2021   PHQ-9 Total Score " - - -   PHQ-9 Total Score MyChart - - -   PHQ-9 Total Score 2 1 2     NASRA-7 SCORE 2/13/2020 8/12/2020 2/25/2021   Total Score - - -   Total Score - - -   Total Score 0 0 0     No flowsheet data found.  Encounter-Level CSA - 08/04/2015:    Controlled Substance Agreement - Scan on 8/6/2015  2:50 PM: CONTROLLED SUBSTANCE AGREEMENT     Patient-Level CSA:    Controlled Substance Agreement - Opioid - Scan on 9/2/2020  7:28 AM             Review of Systems         Objective           Vitals:  No vitals were obtained today due to virtual visit.    Physical Exam   healthy, alert and no distress  PSYCH: Alert and oriented times 3; coherent speech, normal   rate and volume, able to articulate logical thoughts, able   to abstract reason, no tangential thoughts, no hallucinations   or delusions  Her affect is normal  RESP: No cough, no audible wheezing, able to talk in full sentences  Remainder of exam unable to be completed due to telephone visits                Phone call duration: 22 minutes

## 2021-02-26 ASSESSMENT — ANXIETY QUESTIONNAIRES: GAD7 TOTAL SCORE: 0

## 2021-03-01 ENCOUNTER — TELEPHONE (OUTPATIENT)
Dept: SURGERY | Facility: CLINIC | Age: 71
End: 2021-03-01

## 2021-03-01 ENCOUNTER — TELEPHONE (OUTPATIENT)
Dept: ONCOLOGY | Facility: CLINIC | Age: 71
End: 2021-03-01

## 2021-03-01 NOTE — TELEPHONE ENCOUNTER
Called patient to talk with her about the referral to Interventional Pulmonology and if she is still interested in having a virtual visit. Left a voicemail with my call back information.

## 2021-03-12 DIAGNOSIS — G89.4 CHRONIC PAIN SYNDROME: ICD-10-CM

## 2021-03-16 RX ORDER — METHOCARBAMOL 750 MG/1
750 TABLET, FILM COATED ORAL EVERY 6 HOURS PRN
Qty: 180 TABLET | Status: SHIPPED | OUTPATIENT
Start: 2021-03-16 | End: 2022-04-11

## 2021-03-16 NOTE — TELEPHONE ENCOUNTER
Shania is calling because she is completely out of her medication and needs it asap- she took her last one this morning. 03/16

## 2021-03-16 NOTE — TELEPHONE ENCOUNTER
Routing refill request to provider for review/approval because:  Drug not on the [facility] refill protocol methocarbamol (ROBAXIN) 750 MG tablet.    Last order:   Disp Refills Start End TAMRA   methocarbamol (ROBAXIN) 750 MG tablet 180 tablet PRN 2/13/2020  No   Sig - Route: Take 1 tablet (750 mg) by mouth every 6 hours as needed for muscle spasms - Oral         --Last visit:  2/25/2021

## 2021-04-29 ENCOUNTER — NURSE TRIAGE (OUTPATIENT)
Dept: FAMILY MEDICINE | Facility: CLINIC | Age: 71
End: 2021-04-29

## 2021-04-29 ENCOUNTER — TELEPHONE (OUTPATIENT)
Dept: FAMILY MEDICINE | Facility: CLINIC | Age: 71
End: 2021-04-29

## 2021-04-29 NOTE — TELEPHONE ENCOUNTER
Marisa Merino CNP  fyi-  Pt got percocet not oxy-    Spoke to pt and she spoke to pharmacy, and she isn't able to return the percocet, so she will just take them.   I reviewed her last cmp and her lft's were normal.  Reassurance given. Back to oxy next month.     Thanks!     Daysi Cintron RN

## 2021-04-29 NOTE — TELEPHONE ENCOUNTER
Marisa SWENSON DNP     Patient calling asking why she received Percocet on 4/28 instead of Oxycodone--was this a mistake?    Patient aware provider is out of clinic until Monday 5/3/21    Patient asking nurse to discuss with another Provider. Patient informed a visit would be needed and it would not be guaranteed that an Oxycodone Rx would be given.     Patient will wait until Monday when Marisa SWENSON DNP returns to clinic.    Thanks! Marielle Carrillo RN

## 2021-04-29 NOTE — TELEPHONE ENCOUNTER
Reason for Call:  Other prescription    Detailed comments: patient is asking to speak with Daysi the nurse regarding she has some questions regarding her pain meds    Phone Number Patient can be reached at: Home number on file [phone] (home)    Best Time:     Can we leave a detailed message on this number? YES    Call taken on 4/29/2021 at 12:03 PM by Tamela Maher

## 2021-05-02 NOTE — TELEPHONE ENCOUNTER
I'm not sure how that happened.  It is the same medication, just with the addition of acetaminophen.  I will make sure this does not occur again when we do our med check appointment at the end of the month.

## 2021-05-26 ENCOUNTER — VIRTUAL VISIT (OUTPATIENT)
Dept: FAMILY MEDICINE | Facility: CLINIC | Age: 71
End: 2021-05-26
Payer: COMMERCIAL

## 2021-05-26 DIAGNOSIS — F33.42 MAJOR DEPRESSIVE DISORDER, RECURRENT, IN FULL REMISSION (H): ICD-10-CM

## 2021-05-26 DIAGNOSIS — G89.4 CHRONIC PAIN SYNDROME: ICD-10-CM

## 2021-05-26 DIAGNOSIS — E78.5 HYPERLIPIDEMIA LDL GOAL <130: ICD-10-CM

## 2021-05-26 DIAGNOSIS — K21.9 GASTROESOPHAGEAL REFLUX DISEASE WITHOUT ESOPHAGITIS: ICD-10-CM

## 2021-05-26 PROCEDURE — 99214 OFFICE O/P EST MOD 30 MIN: CPT | Mod: 95 | Performed by: NURSE PRACTITIONER

## 2021-05-26 RX ORDER — PAROXETINE 20 MG/1
10 TABLET, FILM COATED ORAL DAILY
Qty: 45 TABLET | Refills: 3 | Status: SHIPPED | OUTPATIENT
Start: 2021-05-26 | End: 2021-10-04

## 2021-05-26 RX ORDER — PANTOPRAZOLE SODIUM 40 MG/1
40 TABLET, DELAYED RELEASE ORAL DAILY
Qty: 90 TABLET | Refills: 3 | Status: SHIPPED | OUTPATIENT
Start: 2021-05-26 | End: 2021-10-04

## 2021-05-26 RX ORDER — OXYCODONE HYDROCHLORIDE 5 MG/1
TABLET ORAL
Qty: 150 TABLET | Refills: 0 | Status: SHIPPED | OUTPATIENT
Start: 2021-07-27 | End: 2021-08-29

## 2021-05-26 RX ORDER — OXYCODONE HYDROCHLORIDE 5 MG/1
TABLET ORAL
Qty: 150 TABLET | Refills: 0 | Status: SHIPPED | OUTPATIENT
Start: 2021-05-28 | End: 2021-08-29

## 2021-05-26 RX ORDER — SIMVASTATIN 20 MG
TABLET ORAL
Qty: 90 TABLET | Refills: 3 | Status: SHIPPED | OUTPATIENT
Start: 2021-05-26 | End: 2021-10-04

## 2021-05-26 RX ORDER — OXYCODONE HYDROCHLORIDE 5 MG/1
TABLET ORAL
Qty: 150 TABLET | Refills: 0 | Status: SHIPPED | OUTPATIENT
Start: 2021-06-27 | End: 2021-08-29

## 2021-05-26 RX ORDER — OXYCODONE HYDROCHLORIDE 5 MG/1
TABLET ORAL
Qty: 150 TABLET | Refills: 0 | Status: SHIPPED | OUTPATIENT
Start: 2021-05-26 | End: 2021-08-29

## 2021-05-26 NOTE — PROGRESS NOTES
"Shania is a 70 year old who is being evaluated via a billable telephone visit.      What phone number would you like to be contacted at? [phone]  How would you like to obtain your AVS? Mail a copy  Encounter-Level CSA - 08/04/2015:    Controlled Substance Agreement - Scan on 8/6/2015  2:50 PM: CONTROLLED SUBSTANCE AGREEMENT     Patient-Level CSA:    Controlled Substance Agreement - Opioid - Scan on 9/2/2020  7:28 AM         Assessment & Plan     Chronic pain syndrome  stable   reviewed.  Refills given for 4 months, will follow up in September for physical and med check.   - oxyCODONE (ROXICODONE) 5 MG tablet; Take 1-2 tablets every 4-6 hours as needed for pain, max of 5 tablets per day  - oxyCODONE (ROXICODONE) 5 MG tablet; Take 1-2 tablets every 4-6 hours as needed for pain, max of 5 per day  - oxyCODONE (ROXICODONE) 5 MG tablet; Take 1-2 tablets every 4-6 hours as needed for pain; max of 5 tablets daily  - oxyCODONE (ROXICODONE) 5 MG tablet; Take 5-10 tablets every 4-6 hours as needed for pain, max of 5 tablets daily    Hyperlipidemia LDL goal <130  The current medical regimen is effective;  continue present plan and medications.   - simvastatin (ZOCOR) 20 MG tablet; Take  by mouth. 1 TABLET AT BEDTIME    Gastroesophageal reflux disease without esophagitis  Stable  The current medical regimen is effective;  continue present plan and medications.   - pantoprazole (PROTONIX) 40 MG EC tablet; Take 1 tablet (40 mg) by mouth daily    Major depressive disorder, recurrent, in full remission (H)  In remission  The current medical regimen is effective;  continue present plan and medications.   - PARoxetine (PAXIL) 20 MG tablet; Take 0.5 tablets (10 mg) by mouth daily      38 minutes spent on the date of the encounter doing patient visit and documentation        BMI:   Estimated body mass index is 29.29 kg/m  as calculated from the following:    Height as of 9/1/20: 1.549 m (5' 1\").    Weight as of 2/25/21: 70.3 kg " (155 lb).           No follow-ups on file.    Lisa Merino NP  [facility]    Michael Jauregui is a 70 year old who presents for the following health issues     HPI     Her chronic pain is stable on 5 oxycodone per day.   She is struggling with some spring allergies.  Mood is stable and she is doing well on her current dose of Paxil.        Review of Systems         Objective           Vitals:  No vitals were obtained today due to virtual visit.    Physical Exam   healthy, alert and no distress  PSYCH: Alert and oriented times 3; coherent speech, normal   rate and volume, able to articulate logical thoughts, able   to abstract reason, no tangential thoughts, no hallucinations   or delusions  Her affect is normal  RESP: No cough, no audible wheezing, able to talk in full sentences  Remainder of exam unable to be completed due to telephone visits                Phone call duration: 23 minutes

## 2021-08-27 ENCOUNTER — NURSE TRIAGE (OUTPATIENT)
Dept: NURSING | Facility: CLINIC | Age: 71
End: 2021-08-27

## 2021-08-27 DIAGNOSIS — G89.4 CHRONIC PAIN SYNDROME: ICD-10-CM

## 2021-08-27 NOTE — TELEPHONE ENCOUNTER
Reason for Call:  Medication or medication refill:    Do you use a [facility]?  Name of the pharmacy and phone number for the current request:  [id number] - [place], [place] - [facility]  [phone]    Name of the medication requested: oxyCODONE (ROXICODONE) 5 MG tablet    Other request: Patient is out of the medication, please advise. Thank you!    Can we leave a detailed message on this number? YES    Phone number patient can be reached at: Cell number on file:    [phone]       Best Time: any    Call taken on 8/27/2021 at 11:10 AM by María Braden

## 2021-08-27 NOTE — LETTER
[facility]  [address]  [facility] [address]  [phone]          August 30, 2021    Shania Holliday                                                                                                                     [address] [place] [address]   [facility] [address]            Dear Shania,    We tried contacting you but were unable to reach you.    TORI Merino CNP, refilled oxyCODONE (ROXICODONE) 5 MG tablet.  She says you are overdue for a 3 month medication check appointment.  Please schedule this appointment at your earliest convenience.  You may call to schedule: [phone].        Sincerely,         Your [facility] Care Team.

## 2021-08-27 NOTE — TELEPHONE ENCOUNTER
Triage Call: Patient was seen by NP Lisa Merino in regards to pain control on May 26th and she stated she was supposed to get prescriptions for oxycodone for 4 months. Noted in patients chart that there are four oxycodone orders with two having overlapping dates. Patient is completely out of the medication.     Paging on call Dr Mercedes Zamorano at 6:15pm via smart web - No response. Re-paging at 6:25pm via smart web. MD contacted nurse at 6:26pm. Per MD Unable to prescribe any pain medication. Patient will have to call when the clinic is open.     Patient was called back and informed of Dr Zamorano's advise. Patient stated understanding and asked for a message to be routed to her PCP.     PCP or covering provider please advise.     Libby Stewart RN Nursing Advisor 8/27/2021 6:44 PM     Reason for Disposition    Caller requesting a CONTROLLED substance prescription refill (e.g., narcotics, ADHD medicines)    Additional Information    Negative: Drug overdose and triager unable to answer question    Negative: Caller requesting information unrelated to medicine    Negative: Caller requesting a prescription for Strep throat and has a positive culture result    Negative: Rash while taking a medication or within 3 days of stopping it    Negative: Immunization reaction suspected    Negative: [1] Asthma and [2] having symptoms of asthma (cough, wheezing, etc.)    Negative: [1] Influenza symptoms AND [2] anti-viral med prescription request, such as Tamiflu    Negative: [1] Symptom of illness (e.g., headache, abdominal pain, earache, vomiting) AND [2] more than mild    Negative: MORE THAN A DOUBLE DOSE of a prescription or over-the-counter (OTC) drug    Negative: [1] DOUBLE DOSE (an extra dose or lesser amount) of over-the-counter (OTC) drug AND [2] any symptoms (e.g., dizziness, nausea, pain, sleepiness)    Negative: [1] DOUBLE DOSE (an extra dose or lesser amount) of prescription drug AND [2] any symptoms (e.g., dizziness,  "nausea, pain, sleepiness)    Negative: Took another person's prescription drug    Negative: [1] DOUBLE DOSE (an extra dose or lesser amount) of prescription drug AND [2] NO symptoms (Exception: a double dose of antibiotics)    Negative: Diabetes drug error or overdose (e.g., took wrong type of insulin or took extra dose)    Negative: [1] Request for URGENT new prescription or refill of \"essential\" medication (i.e., likelihood of harm to patient if not taken) AND [2] triager unable to fill per unit policy    Negative: [1] Prescription not at pharmacy AND [2] was prescribed by PCP recently    Negative: [1] Pharmacy calling with prescription questions AND [2] triager unable to answer question    Negative: [1] Caller has URGENT medication question about med that PCP or specialist prescribed AND [2] triager unable to answer question    Negative: [1] Caller has NON-URGENT medication question about med that PCP prescribed AND [2] triager unable to answer question    Negative: [1] Caller requesting a NON-URGENT new prescription or refill AND [2] triager unable to refill per unit policy    Negative: [1] Caller has medication question about med not prescribed by PCP AND [2] triager unable to answer question (e.g., compatibility with other med, storage)    Protocols used: MEDICATION QUESTION CALL-A-      "

## 2021-08-29 RX ORDER — OXYCODONE HYDROCHLORIDE 5 MG/1
TABLET ORAL
Qty: 150 TABLET | Refills: 0 | Status: SHIPPED | OUTPATIENT
Start: 2021-08-29 | End: 2021-09-29

## 2021-08-30 RX ORDER — OXYCODONE HYDROCHLORIDE 5 MG/1
TABLET ORAL
Qty: 0.1 TABLET | Refills: 0 | OUTPATIENT
Start: 2021-08-30

## 2021-08-30 NOTE — TELEPHONE ENCOUNTER
Writer called patient twice: phone rang once and then went to a white noise sound.    No option to leave a voicemail.    Letter mailed to patient.    GINA Rodriguez, RN  [facility]

## 2021-08-31 ENCOUNTER — TELEPHONE (OUTPATIENT)
Dept: FAMILY MEDICINE | Facility: CLINIC | Age: 71
End: 2021-08-31

## 2021-08-31 NOTE — TELEPHONE ENCOUNTER
Reason for Call:  Other prescription    Detailed comments: Pt calling for she was advised to schedule a med check with her PCP but all of her appointments in September are booked for a virtual appointment and would like a call back to advise further.    Phone Number Patient can be reached at: Home number on file [phone] (home)    Best Time: anytime    Can we leave a detailed message on this number? YES    Call taken on 8/31/2021 at 3:29 PM by Marc Poe

## 2021-09-28 ENCOUNTER — TELEPHONE (OUTPATIENT)
Dept: FAMILY MEDICINE | Facility: CLINIC | Age: 71
End: 2021-09-28

## 2021-09-28 NOTE — TELEPHONE ENCOUNTER
Prior Authorization Retail Medication Request    Medication/Dose: methocarbamol (ROBAXIN) 750 MG tablet  ICD code (if different than what is on RX):  Previously Tried and Failed:  Rationale:    Insurance Name:  [employer]  Insurance ID: [id number]    Pharmacy Information (if different than what is on RX)  Name:  Phone:    Please include previous medications tried and failed.  Please ask insurance for medications on formulary.

## 2021-09-29 ENCOUNTER — VIRTUAL VISIT (OUTPATIENT)
Dept: FAMILY MEDICINE | Facility: CLINIC | Age: 71
End: 2021-09-29
Payer: COMMERCIAL

## 2021-09-29 DIAGNOSIS — G89.4 CHRONIC PAIN SYNDROME: Primary | ICD-10-CM

## 2021-09-29 DIAGNOSIS — F11.90 CHRONIC, CONTINUOUS USE OF OPIOIDS: ICD-10-CM

## 2021-09-29 PROCEDURE — 99441 PR PHYSICIAN TELEPHONE EVALUATION 5-10 MIN: CPT | Mod: 95 | Performed by: NURSE PRACTITIONER

## 2021-09-29 RX ORDER — OXYCODONE HYDROCHLORIDE 5 MG/1
TABLET ORAL
Qty: 150 TABLET | Refills: 0 | Status: SHIPPED | OUTPATIENT
Start: 2021-11-28 | End: 2022-03-21

## 2021-09-29 RX ORDER — OXYCODONE HYDROCHLORIDE 5 MG/1
TABLET ORAL
Qty: 150 TABLET | Refills: 0 | Status: SHIPPED | OUTPATIENT
Start: 2021-10-29 | End: 2021-12-22

## 2021-09-29 RX ORDER — OXYCODONE HYDROCHLORIDE 5 MG/1
TABLET ORAL
Qty: 150 TABLET | Refills: 0 | Status: SHIPPED | OUTPATIENT
Start: 2021-09-29 | End: 2022-03-21

## 2021-09-29 ASSESSMENT — ANXIETY QUESTIONNAIRES
3. WORRYING TOO MUCH ABOUT DIFFERENT THINGS: NOT AT ALL
5. BEING SO RESTLESS THAT IT IS HARD TO SIT STILL: NOT AT ALL
1. FEELING NERVOUS, ANXIOUS, OR ON EDGE: NOT AT ALL
6. BECOMING EASILY ANNOYED OR IRRITABLE: NOT AT ALL
GAD7 TOTAL SCORE: 0
IF YOU CHECKED OFF ANY PROBLEMS ON THIS QUESTIONNAIRE, HOW DIFFICULT HAVE THESE PROBLEMS MADE IT FOR YOU TO DO YOUR WORK, TAKE CARE OF THINGS AT HOME, OR GET ALONG WITH OTHER PEOPLE: NOT DIFFICULT AT ALL
7. FEELING AFRAID AS IF SOMETHING AWFUL MIGHT HAPPEN: NOT AT ALL
2. NOT BEING ABLE TO STOP OR CONTROL WORRYING: NOT AT ALL

## 2021-09-29 ASSESSMENT — PATIENT HEALTH QUESTIONNAIRE - PHQ9
5. POOR APPETITE OR OVEREATING: NOT AT ALL
SUM OF ALL RESPONSES TO PHQ QUESTIONS 1-9: 2

## 2021-09-29 NOTE — TELEPHONE ENCOUNTER
Central Prior Authorization Team   Phone: [phone]    PA Initiation    Medication: methocarbamol (ROBAXIN) 750 MG tablet, rec 9-28-21  Insurance Company: [employer] - Phone [phone] Fax [phone]  Pharmacy Filling the Rx: [id number] - [place], [place] - [facility] [address]  Filling Pharmacy Phone: [phone]  Filling Pharmacy Fax:    Start Date: 9/29/2021

## 2021-09-29 NOTE — TELEPHONE ENCOUNTER
Prior Authorization Approval    Authorization Effective Date: 8/30/2021  Authorization Expiration Date: 9/29/2022  Medication: methocarbamol (ROBAXIN) 750 MG tablet, rec 9-28-21  Approved Dose/Quantity:    Reference #:     Insurance Company: [employer] - Phone [phone] Fax [phone]  Expected CoPay:       CoPay Card Available:      Foundation Assistance Needed:    Which Pharmacy is filling the prescription (Not needed for infusion/clinic administered): [id number] - [place] [address] [address]  Pharmacy Notified: Yes  Patient Notified: Yes  **Instructed pharmacy to notify patient when script is ready to /ship.**

## 2021-09-29 NOTE — PROGRESS NOTES
Shania is a 71 year old who is being evaluated via a billable telephone visit.      What phone number would you like to be contacted at? [phone]   How would you like to obtain your AVS? Pt declined     Assessment & Plan     Chronic pain syndrome  Stable   checked.   She has an in-person appointment next week, will get UDS and CSA.  - oxyCODONE (ROXICODONE) 5 MG tablet; Take 1-2 tablets every 4-6 hours as needed for pain, max of 5 tablets per day  - oxyCODONE (ROXICODONE) 5 MG tablet; Take 1-2 tablets every 4-6 hours as needed for pain, max of 5 tablets per day  - oxyCODONE (ROXICODONE) 5 MG tablet; Take 1-2 tablets every 4-6 hours as needed for pain, max of 5 tablets per day    Chronic, continuous use of opioids  See above.   - oxyCODONE (ROXICODONE) 5 MG tablet; Take 1-2 tablets every 4-6 hours as needed for pain, max of 5 tablets per day  - oxyCODONE (ROXICODONE) 5 MG tablet; Take 1-2 tablets every 4-6 hours as needed for pain, max of 5 tablets per day  - oxyCODONE (ROXICODONE) 5 MG tablet; Take 1-2 tablets every 4-6 hours as needed for pain, max of 5 tablets per day             Tobacco Cessation:   reports that she has been smoking cigarettes. She has a 19.00 pack-year smoking history. She has never used smokeless tobacco.  Tobacco Cessation Action Plan: Information offered: Patient not interested at this time        No follow-ups on file.    Lisa Merino NP  [facility]   Shania is a 71 year old who presents for the following health issues     HPI     Medication Followup of Oxycodone 5mg tablet    Taking Medication as prescribed: yes- taking 2 at bed and 3 during the day every 4-6 hours    Side Effects:  None    Medication Helping Symptoms:  yes   Her pain is stable and controlled well on her current regimen of 5 oxycodone per day.        Review of Systems         Objective    Vitals - Patient Reported  Systolic (Patient Reported): 120  Diastolic (Patient  Reported): 84  Weight (Patient Reported): 70.3 kg (155 lb)  SpO2 (Patient Reported): 98  Pulse (Patient Reported): 97        Physical Exam   healthy, alert and no distress  PSYCH: Alert and oriented times 3; coherent speech, normal   rate and volume, able to articulate logical thoughts, able   to abstract reason, no tangential thoughts, no hallucinations   or delusions  Her affect is normal  RESP: No cough, no audible wheezing, able to talk in full sentences  Remainder of exam unable to be completed due to telephone visits                Phone call duration: 10 minutes

## 2021-09-30 ASSESSMENT — ANXIETY QUESTIONNAIRES: GAD7 TOTAL SCORE: 0

## 2021-10-04 ENCOUNTER — OFFICE VISIT (OUTPATIENT)
Dept: FAMILY MEDICINE | Facility: CLINIC | Age: 71
End: 2021-10-04
Payer: COMMERCIAL

## 2021-10-04 VITALS
HEIGHT: 61 IN | SYSTOLIC BLOOD PRESSURE: 134 MMHG | RESPIRATION RATE: 16 BRPM | WEIGHT: 154 LBS | DIASTOLIC BLOOD PRESSURE: 80 MMHG | TEMPERATURE: 97.1 F | BODY MASS INDEX: 29.07 KG/M2 | OXYGEN SATURATION: 95 % | HEART RATE: 85 BPM

## 2021-10-04 DIAGNOSIS — F33.42 MAJOR DEPRESSIVE DISORDER, RECURRENT, IN FULL REMISSION (H): ICD-10-CM

## 2021-10-04 DIAGNOSIS — K21.9 GASTROESOPHAGEAL REFLUX DISEASE WITHOUT ESOPHAGITIS: ICD-10-CM

## 2021-10-04 DIAGNOSIS — Z72.0 TOBACCO ABUSE: ICD-10-CM

## 2021-10-04 DIAGNOSIS — G89.4 CHRONIC PAIN SYNDROME: ICD-10-CM

## 2021-10-04 DIAGNOSIS — E78.5 HYPERLIPIDEMIA LDL GOAL <130: ICD-10-CM

## 2021-10-04 DIAGNOSIS — Z00.00 ENCOUNTER FOR MEDICARE ANNUAL WELLNESS EXAM: Primary | ICD-10-CM

## 2021-10-04 DIAGNOSIS — R91.1 LUNG NODULE: ICD-10-CM

## 2021-10-04 LAB
AMPHETAMINES UR QL: NOT DETECTED
BARBITURATES UR QL SCN: NOT DETECTED
BENZODIAZ UR QL SCN: NOT DETECTED
BUPRENORPHINE UR QL: NOT DETECTED
CANNABINOIDS UR QL: NOT DETECTED
COCAINE UR QL SCN: NOT DETECTED
D-METHAMPHET UR QL: NOT DETECTED
METHADONE UR QL SCN: NOT DETECTED
OPIATES UR QL SCN: NOT DETECTED
OXYCODONE UR QL SCN: DETECTED
PCP UR QL SCN: NOT DETECTED
PROPOXYPH UR QL: NOT DETECTED
TRICYCLICS UR QL SCN: NOT DETECTED

## 2021-10-04 PROCEDURE — 99397 PER PM REEVAL EST PAT 65+ YR: CPT | Mod: 25 | Performed by: NURSE PRACTITIONER

## 2021-10-04 PROCEDURE — 80306 DRUG TEST PRSMV INSTRMNT: CPT | Performed by: NURSE PRACTITIONER

## 2021-10-04 PROCEDURE — 80061 LIPID PANEL: CPT | Performed by: NURSE PRACTITIONER

## 2021-10-04 PROCEDURE — G0008 ADMIN INFLUENZA VIRUS VAC: HCPCS | Performed by: NURSE PRACTITIONER

## 2021-10-04 PROCEDURE — 36415 COLL VENOUS BLD VENIPUNCTURE: CPT | Performed by: NURSE PRACTITIONER

## 2021-10-04 PROCEDURE — 99214 OFFICE O/P EST MOD 30 MIN: CPT | Mod: 25 | Performed by: NURSE PRACTITIONER

## 2021-10-04 PROCEDURE — 90662 IIV NO PRSV INCREASED AG IM: CPT | Performed by: NURSE PRACTITIONER

## 2021-10-04 PROCEDURE — 80053 COMPREHEN METABOLIC PANEL: CPT | Performed by: NURSE PRACTITIONER

## 2021-10-04 RX ORDER — ALBUTEROL SULFATE 90 UG/1
AEROSOL, METERED RESPIRATORY (INHALATION)
Qty: 8.5 G | Status: SHIPPED | OUTPATIENT
Start: 2021-10-04 | End: 2022-10-11

## 2021-10-04 RX ORDER — PAROXETINE 20 MG/1
10 TABLET, FILM COATED ORAL DAILY
Qty: 45 TABLET | Refills: 3 | Status: SHIPPED | OUTPATIENT
Start: 2021-10-04 | End: 2022-10-11

## 2021-10-04 RX ORDER — PANTOPRAZOLE SODIUM 40 MG/1
40 TABLET, DELAYED RELEASE ORAL DAILY
Qty: 90 TABLET | Refills: 3 | Status: SHIPPED | OUTPATIENT
Start: 2021-10-04 | End: 2022-06-14

## 2021-10-04 RX ORDER — SIMVASTATIN 20 MG
TABLET ORAL
Qty: 90 TABLET | Refills: 3 | Status: SHIPPED | OUTPATIENT
Start: 2021-10-04 | End: 2022-10-11

## 2021-10-04 ASSESSMENT — ENCOUNTER SYMPTOMS
HEARTBURN: 0
PARESTHESIAS: 0
FREQUENCY: 0
CHILLS: 0
JOINT SWELLING: 1
NERVOUS/ANXIOUS: 0
SHORTNESS OF BREATH: 0
HEMATOCHEZIA: 0
ARTHRALGIAS: 1
NAUSEA: 0
CONSTIPATION: 0
DYSURIA: 0
FEVER: 0
PALPITATIONS: 0
ABDOMINAL PAIN: 0
MYALGIAS: 0
WEAKNESS: 0
BREAST MASS: 0
HEADACHES: 0
HEMATURIA: 0
EYE PAIN: 0
SORE THROAT: 0
COUGH: 0
DIARRHEA: 0
DIZZINESS: 0

## 2021-10-04 ASSESSMENT — ACTIVITIES OF DAILY LIVING (ADL): CURRENT_FUNCTION: NO ASSISTANCE NEEDED

## 2021-10-04 ASSESSMENT — MIFFLIN-ST. JEOR: SCORE: 1155.92

## 2021-10-04 NOTE — LETTER
Opioid / Opioid Plus Controlled Substance Agreement    This is an agreement between you and your provider about the safe and appropriate use of controlled substance/opioids prescribed by your care team. Controlled substances are medicines that can cause physical and mental dependence (abuse).    There are strict laws about having and using these medicines. We here at [facility] are committing to working with you in your efforts to get better. To support you in this work, we ll help you schedule regular office appointments for medicine refills. If we must cancel or change your appointment for any reason, we ll make sure you have enough medicine to last until your next appointment.     As a Provider, I will:    Listen carefully to your concerns and treat you with respect.     Recommend a treatment plan that I believe is in your best interest. This plan may involve therapies other than opioid pain medication.     Talk with you often about the possible benefits, and the risk of harm of any medicine that we prescribe for you.     Provide a plan on how to taper (discontinue or go off) using this medicine if the decision is made to stop its use.    As a Patient, I understand that opioid(s):     Are a controlled substance prescribed by my care team to help me function or work and manage my condition(s).     Are strong medicines and can cause serious side effects such as:    Drowsiness, which can seriously affect my driving ability    A lower breathing rate, enough to cause death    Harm to my thinking ability     Depression     Abuse of and addiction to this medicine    Need to be taken exactly as prescribed. Combining opioids with certain medicines or chemicals (such as illegal drugs, sedatives, sleeping pills, and benzodiazepines) can be dangerous or even fatal. If I stop opioids suddenly, I may have severe withdrawal symptoms.    Do not work for all types of pain nor for all patients. If they re not helpful, I may  be asked to stop them.        The risks, benefits and side effects of these medicine(s) were explained to me. I agree that:  1. I will take part in other treatments as advised by my care team. This may be psychiatry or counseling, physical therapy, behavioral therapy, group treatment or a referral to a specialist.     2. I will keep all my appointments. I understand that this is part of the monitoring of opioids. My care team may require an office visit for EVERY opioid/controlled substance refill. If I miss appointments or don t follow instructions, my care team may stop my medicine.    3. I will take my medicines as prescribed. I will not change the dose or schedule unless my care team tells me to. There will be no refills if I run out early.     4. I may be asked to come to the clinic and complete a urine drug test or complete a pill count at any time. If I don t give a urine sample or participate in a pill count, the care team may stop my medicine.    5. I will only receive prescriptions from this clinic for chronic pain. If I am treated by another provider for acute pain issues, I will tell them that I am taking opioid pain medication for chronic pain and that I have a treatment agreement with this provider. I will inform my [facility] care team within one business day if I am given a prescription for any pain medication by another healthcare provider. My [facility] care team can contact other providers and pharmacists about my use of any medicines.    6. It is up to me to make sure that I don t run out of my medicines on weekends or holidays. If my care team is willing to refill my opioid prescription without a visit, I must request refills only during office hours. Refills may take up to 3 business days to process. I will use one pharmacy to fill all my opioid and other controlled substance prescriptions. I will notify the clinic about any changes to my insurance or medication  availability.    7. I am responsible for my prescriptions. If the medicine/prescription is lost, stolen or destroyed, it will not be replaced. I also agree not to share controlled substance medicines with anyone.    8. I am aware I should not use any illegal or recreational drugs. I agree not to drink alcohol unless my care team says I can.       9. If I enroll in the [place] [facility] program, I will tell my care team prior to my next refill.     10. I will tell my care team right away if I become pregnant, have a new medical problem treated outside of my regular clinic, or have a change in my medications.    11. I understand that this medicine can affect my thinking, judgment and reaction time. Alcohol and drugs affect the brain and body, which can affect the safety of my driving. Being under the influence of alcohol or drugs can affect my decision-making, behaviors, personal safety, and the safety of others. Driving while impaired (DWI) can occur if a person is driving, operating, or in physical control of a car, motorcycle, boat, snowmobile, ATV, motorbike, off-road vehicle, or any other motor vehicle ([place] Statute 169A.20). I understand the risk if I choose to drive or operate any vehicle or machinery.    I understand that if I do not follow any of the conditions above, my prescriptions or treatment may be stopped or changed.          Opioids  What You Need to Know    What are opioids?   Opioids are pain medicines that must be prescribed by a doctor. They are also known as narcotics.     Examples are:   1. morphine (MS Contin, Jeannie)  2. oxycodone (Oxycontin)  3. oxycodone and acetaminophen (Percocet)  4. hydrocodone and acetaminophen (Vicodin, Norco)   5. fentanyl patch (Duragesic)   6. hydromorphone (Dilaudid)   7. methadone  8. codeine (Tylenol #3)     What do opioids do well?   Opioids are best for severe short-term pain such as after a surgery or injury. They may work well for cancer pain. They may  help some people with long-lasting (chronic) pain.     What do opioids NOT do well?   Opioids never get rid of pain entirely, and they don t work well for most patients with chronic pain. Opioids don t reduce swelling, one of the causes of pain.                                    Other ways to manage chronic pain and improve function include:       Treat the health problem that may be causing pain    Anti-inflammation medicines, which reduce swelling and tenderness, such as ibuprofen (Advil, Motrin) or naproxen (Aleve)    Acetaminophen (Tylenol)    Antidepressants and anti-seizure medicines, especially for nerve pain    Topical treatments such as patches or creams    Injections or nerve blocks    Chiropractic or osteopathic treatment    Acupuncture, massage, deep breathing, meditation, visual imagery, aromatherapy    Use heat or ice at the pain site    Physical therapy     Exercise    Stop smoking    Take part in therapy       Risks and side effects     Talk to your doctor before you start or decide to keep taking opioids. Possible side effects include:      Lowering your breathing rate enough to cause death    Overdose, including death, especially if taking higher than prescribed doses    Worse depression symptoms; less pleasure in things you usually enjoy    Feeling tired or sluggish    Slower thoughts or cloudy thinking    Being more sensitive to pain over time; pain is harder to control    Trouble sleeping or restless sleep    Changes in hormone levels (for example, less testosterone)    Changes in sex drive or ability to have sex    Constipation    Unsafe driving    Itching and sweating    Dizziness    Nausea, throwing up and dry mouth    What else should I know about opioids?    Opioids may lead to dependence, tolerance, or addiction.      Dependence means that if you stop or reduce the medicine too quickly, you will have withdrawal symptoms. These include loose poop (diarrhea), jitters, flu-like symptoms,  nervousness and tremors. Dependence is not the same as addiction.                       Tolerance means needing higher doses over time to get the same effect. This may increase the chance of serious side effects.      Addiction is when people improperly use a substance that harms their body, their mind or their relations with others. Use of opiates can cause a relapse of addiction if you have a history of drug or alcohol abuse.      People who have used opioids for a long time may have a lower quality of life, worse depression, higher levels of pain and more visits to doctors.    You can overdose on opioids. Take these steps to lower your risk of overdose:    1. Recognize the signs:  Signs of overdose include decrease or loss of consciousness (blackout), slowed breathing, trouble waking up and blue lips. If someone is worried about overdose, they should call 911.    2. Talk to your doctor about Narcan (naloxone).   If you are at risk for overdose, you may be given a prescription for Narcan. This medicine very quickly reverses the effects of opioids.   If you overdose, a friend or family member can give you Narcan while waiting for the ambulance. They need to know the signs of overdose and how to give Narcan.     3. Don't use alcohol or street drugs.   Taking them with opioids can cause death.    4. Do not take any of these medicines unless your doctor says it s OK. Taking these with opioids can cause death:    Benzodiazepines, such as lorazepam (Ativan), alprazolam (Xanax) or diazepam (Valium)    Muscle relaxers, such as cyclobenzaprine (Flexeril)    Sleeping pills like zolpidem (Ambien)     Other opioids      How to keep you and other people safe while taking opioids:    1. Never share your opioids with others.  Opioid medicines are regulated by the Drug Enforcement Agency ([place]). Selling or sharing medications is a criminal act.    2. Be sure to store opioids in a secure place, locked up if possible. Young children  can easily swallow them and overdose.    3. When you are traveling with your medicines, keep them in the original bottles. If you use a pill box, be sure you also carry a copy of your medicine list from your clinic or pharmacy.    4. Safe disposal of opioids    Most pharmacies have places to get rid of medicine, called disposal kiosks. Medicine disposal options are also available in every [facility]. Search your county and  medication disposal  to find more options. You can find more details at:  [email or web address]     I agree that my provider, clinic care team, and pharmacy may work with any city, state or federal law enforcement agency that investigates the misuse, sale, or other diversion of my controlled medicine. I will allow my provider to discuss my care with, or share a copy of, this agreement with any other treating provider, pharmacy or emergency room where I receive care.    I have read this agreement and have asked questions about anything I did not understand.    _______________________________________________________  Patient Signature - Shania Holliday _____________________                   Date     _______________________________________________________  Provider Signature - Lisa Merino NP   _____________________                   Date     _______________________________________________________  Witness Signature (required if provider not present while patient signing)   _____________________                   Date

## 2021-10-04 NOTE — PROGRESS NOTES
"SUBJECTIVE:   Shania Holliday is a 71 year old female who presents for Preventive Visit.  Patient has been advised of split billing requirements and indicates understanding: Yes   Are you in the first 12 months of your Medicare coverage?  No    Healthy Habits:     In general, how would you rate your overall health?  Good    Frequency of exercise:  4-5 days/week    Duration of exercise:  15-30 minutes    Do you usually eat at least 4 servings of fruit and vegetables a day, include whole grains    & fiber and avoid regularly eating high fat or \"junk\" foods?  Yes    Taking medications regularly:  No    Medication side effects:  None    Ability to successfully perform activities of daily living:  No assistance needed    Home Safety:  No safety concerns identified    Hearing Impairment:  No hearing concerns    In the past 6 months, have you been bothered by leaking of urine? Yes    In general, how would you rate your overall mental or emotional health?  Good      PHQ-2 Total Score: 0    Additional concerns today:  No     Encounter-Level CSA - 08/04/2015:    Controlled Substance Agreement - Scan on 8/6/2015  2:50 PM: CONTROLLED SUBSTANCE AGREEMENT     Patient-Level CSA:    Controlled Substance Agreement - Opioid - Scan on 9/2/2020  7:28 AM       Her mood is stable, she denies any current symptoms of depression.  She is doing well on her current dose of Paxil.    Do you feel safe in your environment? Yes    Have you ever done Advance Care Planning? (For example, a Health Directive, POLST, or a discussion with a medical provider or your loved ones about your wishes): Yes, advance care planning is on file.       Fall risk  Fallen 2 or more times in the past year?: No  Any fall with injury in the past year?: No    Cognitive Screening   1) Repeat 3 items (Leader, Season, Table)    2) Clock draw: NORMAL  3) 3 item recall: Recalls 2 objects   Results: NORMAL clock, 1-2 items recalled: COGNITIVE IMPAIRMENT LESS LIKELY    Mini-CogTM " Copyright DIMA [employer]ub[employer]. Licensed by the author for use in [facility]; reprinted with permission (maxine@[facility]). All rights reserved.      Do you have sleep apnea, excessive snoring or daytime drowsiness?: no    Reviewed and updated as needed this visit by clinical staff  Tobacco  Allergies  Meds   Med Hx  Surg Hx  Fam Hx  Soc Hx        Reviewed and updated as needed this visit by Provider                Social History     Tobacco Use     Smoking status: Current Every Day Smoker     Packs/day: 0.50     Years: 38.00     Pack years: 19.00     Types: Cigarettes     Smokeless tobacco: Never Used     Tobacco comment: 4-5 per day.    Substance Use Topics     Alcohol use: No     Alcohol/week: 0.0 standard drinks     If you drink alcohol do you typically have >3 drinks per day or >7 drinks per week? Not applicable    Alcohol Use 10/4/2021   Prescreen: >3 drinks/day or >7 drinks/week? Not Applicable   Prescreen: >3 drinks/day or >7 drinks/week? -     Current providers sharing in care for this patient include:   Patient Care Team:  Lisa Merino NP as PCP - General  Lisa Merino NP as Assigned PCP  Sara Monzon MD as Assigned Surgical Provider    The following health maintenance items are reviewed in Epic and correct as of today:  Health Maintenance Due   Topic Date Due     COPD ACTION PLAN  Never done     ZOSTER IMMUNIZATION (1 of 2) Never done     URINE DRUG SCREEN  09/01/2021     FALL RISK ASSESSMENT  09/01/2021     TREATMENT AGREEMENT FOR CHRONIC PAIN MANAGEMENT  09/02/2021     LUNG CANCER SCREENING ANNUAL  10/01/2021               Review of Systems   Constitutional: Negative for chills and fever.   HENT: Negative for congestion, ear pain, hearing loss and sore throat.    Eyes: Negative for pain and visual disturbance.   Respiratory: Negative for cough and shortness of breath.    Cardiovascular: Negative for chest pain, palpitations and peripheral edema.   Gastrointestinal: Negative for  "abdominal pain, constipation, diarrhea, heartburn, hematochezia and nausea.   Breasts:  Negative for tenderness, breast mass and discharge.   Genitourinary: Negative for dysuria, frequency, genital sores, hematuria, pelvic pain, urgency, vaginal bleeding and vaginal discharge.   Musculoskeletal: Positive for arthralgias and joint swelling. Negative for myalgias.   Skin: Negative for rash.   Neurological: Negative for dizziness, weakness, headaches and paresthesias.   Psychiatric/Behavioral: Negative for mood changes. The patient is not nervous/anxious.          OBJECTIVE:   /80   Pulse 85   Temp 97.1  F (36.2  C) (Tympanic)   Resp 16   Ht 1.549 m (5' 1\")   Wt 69.9 kg (154 lb)   LMP  (LMP Unknown)   SpO2 95%   BMI 29.10 kg/m   Estimated body mass index is 29.1 kg/m  as calculated from the following:    Height as of this encounter: 1.549 m (5' 1\").    Weight as of this encounter: 69.9 kg (154 lb).  Physical Exam  GENERAL: healthy, alert and no distress  EYES: Eyes grossly normal to inspection, PERRL and conjunctivae and sclerae normal  HENT: ear canals and TM's normal, nose and mouth without ulcers or lesions  NECK: no adenopathy, no asymmetry, masses, or scars and thyroid normal to palpation  RESP: lungs clear to auscultation - no rales, rhonchi or wheezes  CV: regular rate and rhythm, normal S1 S2, no S3 or S4, no murmur, click or rub, no peripheral edema and peripheral pulses strong  ABDOMEN: soft, nontender, no hepatosplenomegaly, no masses and bowel sounds normal  MS: reduced ROM of cervical and lumbar spine  SKIN: no suspicious lesions or rashes  NEURO: Normal strength and tone, mentation intact and speech normal  PSYCH: mentation appears normal, affect normal/bright        ASSESSMENT / PLAN:   (Z00.00) Encounter for Medicare annual wellness exam  (primary encounter diagnosis)  Comment:   Plan:     (K21.9) Gastroesophageal reflux disease without esophagitis  Comment:   Plan: pantoprazole (PROTONIX) " "40 MG EC tablet        The current medical regimen is effective;  continue present plan and medications.     (F33.42) Major depressive disorder, recurrent, in full remission (H)  Comment: in complete remission  Plan: PARoxetine (PAXIL) 20 MG tablet        The current medical regimen is effective;  continue present plan and medications.     (E78.5) Hyperlipidemia LDL goal <130  Comment: stable  Plan: simvastatin (ZOCOR) 20 MG tablet, Comprehensive        metabolic panel (BMP + Alb, Alk Phos, ALT, AST,        Total. Bili, TP), Lipid panel reflex to direct         LDL Non-fasting        The current medical regimen is effective;  continue present plan and medications.     (Z72.0) Tobacco abuse  Comment:   Plan: albuterol (PROAIR HFA) 108 (90 Base) MCG/ACT         inhaler            (R91.1) Lung nodule  Comment:   Plan: CT Chest w/o Contrast            (G89.4) Chronic pain syndrome  Comment:   Plan: Drug Abuse Screen Panel 13, Urine (Pain Care         Package) - lab collect              Patient has been advised of split billing requirements and indicates understanding:   COUNSELING:  Reviewed preventive health counseling, as reflected in patient instructions    Estimated body mass index is 29.1 kg/m  as calculated from the following:    Height as of this encounter: 1.549 m (5' 1\").    Weight as of this encounter: 69.9 kg (154 lb).        She reports that she has been smoking cigarettes. She has a 19.00 pack-year smoking history. She has never used smokeless tobacco.  Tobacco Cessation Action Plan:   Information offered: Patient not interested at this time      Appropriate preventive services were discussed with this patient, including applicable screening as appropriate for cardiovascular disease, diabetes, osteopenia/osteoporosis, and glaucoma.  As appropriate for age/gender, discussed screening for colorectal cancer, prostate cancer, breast cancer, and cervical cancer. Checklist reviewing preventive services available " has been given to the patient.    Reviewed patients plan of care and provided an AVS. The Basic Care Plan (routine screening as documented in Health Maintenance) for Shania meets the Care Plan requirement. This Care Plan has been established and reviewed with the Patient.    Counseling Resources:  ATP IV Guidelines  Pooled Cohorts Equation Calculator  Breast Cancer Risk Calculator  Breast Cancer: Medication to Reduce Risk  FRAX Risk Assessment  ICSI Preventive Guidelines  Dietary Guidelines for Americans, 2010  USDA's MyPlate  ASA Prophylaxis  Lung CA Screening    Lisa Merino NP  [facility]    Identified Health Risks:

## 2021-10-04 NOTE — LETTER
October 6, 2021      Shania Holliday  [address] [place] [address]        Dear ,    We are writing to inform you of your test results.    Labs are normal or no significant abnormalities.       Resulted Orders   Drug Abuse Screen Panel 13, Urine (Pain Care Package) - lab collect   Result Value Ref Range    Cannabinoids (78-[id number]-8-[id number]-9-THC) Not Detected Not Detected, Indeterminate      Comment:      Cutoff for a negative cannabinoid is 50 ng/mL or less.    Phencyclidine Not Detected Not Detected, Indeterminate      Comment:      Cutoff for a negative PCP is 25 ng/mL or less.    Cocaine (Benzoylecgonine) Not Detected Not Detected, Indeterminate      Comment:      Cutoff for a negative cocaine is 150 ng/ml or less.    Methamphetamine (d-Methamphetamine) Not Detected Not Detected, Indeterminate      Comment:      Cutoff for a negative methamphetamine is 500 ng/ml or less.    Opiates (Morphine) Not Detected Not Detected, Indeterminate      Comment:      Cutoff for a negative opiate is 100 ng/ml or less.    Amphetamine (d-Amphetamine) Not Detected Not Detected, Indeterminate      Comment:      Cutoff for a negative amphetamine is 500 ng/mL or less.    Benzodiazepines (Nordiazepam) Not Detected Not Detected, Indeterminate      Comment:      Cutoff for a negative benzodiazepine is 150 ng/ml or less.    Tricyclic Antidepressants (Desipramine) Not Detected Not Detected, Indeterminate      Comment:      Cutoff for a negative tricyclic antidepressant is 300 ng/ml or less.    Methadone Not Detected Not Detected, Indeterminate      Comment:      Cutoff for a negative methadone is 200 ng/ml or less.    Barbiturates (Butalbital) Not Detected Not Detected, Indeterminate      Comment:      Cutoff for a negative barbituate is 200 ng/ml or less.    Oxycodone Detected (A) Not Detected, Indeterminate      Comment:      Cutoff for a positive oxycodone is greater than 100 ng/ml.  This is an unconfirmed screening  result to be used for medical purposes only.     Propoxyphene (Norpropoxyphene) Not Detected Not Detected, Indeterminate      Comment:      Cutoff for a negative propoxyphene is 300 ng/ml or less.    Buprenorphine Not Detected Not Detected, Indeterminate      Comment:      Cutoff for a negative buprenorphine is 10 ng/ml or less.   Comprehensive metabolic panel (BMP + Alb, Alk Phos, ALT, AST, Total. Bili, TP)   Result Value Ref Range    Sodium 131 (L) 133 - 144 mmol/L    Potassium 4.4 3.4 - 5.3 mmol/L    Chloride 99 94 - 109 mmol/L    Carbon Dioxide (CO2) 25 20 - 32 mmol/L    Anion Gap 7 3 - 14 mmol/L    Urea Nitrogen 11 7 - 30 mg/dL    Creatinine 0.85 0.52 - 1.04 mg/dL    Calcium 9.7 8.5 - 10.1 mg/dL    Glucose 97 70 - 99 mg/dL    Alkaline Phosphatase 82 40 - 150 U/L    AST 17 0 - 45 U/L    ALT 19 0 - 50 U/L    Protein Total 7.1 6.8 - 8.8 g/dL    Albumin 3.8 3.4 - 5.0 g/dL    Bilirubin Total 0.5 0.2 - 1.3 mg/dL    GFR Estimate 70 >60 mL/min/1.73m2      Comment:      As of July 11, 2021, eGFR is calculated by the CKD-EPI creatinine equation, without race adjustment. eGFR can be influenced by muscle mass, exercise, and diet. The reported eGFR is an estimation only and is only applicable if the renal function is stable.   Lipid panel reflex to direct LDL Non-fasting   Result Value Ref Range    Cholesterol 196 <200 mg/dL    Triglycerides 65 <150 mg/dL    Direct Measure HDL 76 >=50 mg/dL    LDL Cholesterol Calculated 107 (H) <=100 mg/dL    Non HDL Cholesterol 120 <130 mg/dL    Patient Fasting > 8hrs? Yes     Narrative    Cholesterol  Desirable:  <200 mg/dL    Triglycerides  Normal:  Less than 150 mg/dL  Borderline High:  150-199 mg/dL  High:  200-499 mg/dL  Very High:  Greater than or equal to 500 mg/dL    Direct Measure HDL  Female:  Greater than or equal to 50 mg/dL   Male:  Greater than or equal to 40 mg/dL    LDL Cholesterol  Desirable:  <100mg/dL  Above Desirable:  100-129 mg/dL   Borderline High:  130-159 mg/dL    High:  160-189 mg/dL   Very High:  >= 190 mg/dL    Non HDL Cholesterol  Desirable:  130 mg/dL  Above Desirable:  130-159 mg/dL  Borderline High:  160-189 mg/dL  High:  190-219 mg/dL  Very High:  Greater than or equal to 220 mg/dL       If you have any questions or concerns, please call the clinic at the number listed above.       Sincerely,      Lisa Merino NP/I.CHRISTIAN, MA

## 2021-10-04 NOTE — PATIENT INSTRUCTIONS
Call [phone] to schedule the chest CT scan.      Patient Education   Personalized Prevention Plan  You are due for the preventive services outlined below.  Your care team is available to assist you in scheduling these services.  If you have already completed any of these items, please share that information with your care team to update in your medical record.  Health Maintenance Due   Topic Date Due     ANNUAL REVIEW OF HM ORDERS  Never done     COPD Action Plan  Never done     Zoster (Shingles) Vaccine (1 of 2) Never done     Discuss Advance Care Planning  04/17/2019     Annual Wellness Visit  09/01/2021     URINE DRUG SCREEN  09/01/2021     FALL RISK ASSESSMENT  09/01/2021     Flu Vaccine (1) 09/01/2021     TREATMENT AGREEMENT FOR CHRONIC PAIN MANAGEMENT  09/02/2021     Lung Cancer Screening (CT Scan)  10/01/2021

## 2021-10-05 LAB
ALBUMIN SERPL-MCNC: 3.8 G/DL (ref 3.4–5)
ALP SERPL-CCNC: 82 U/L (ref 40–150)
ALT SERPL W P-5'-P-CCNC: 19 U/L (ref 0–50)
ANION GAP SERPL CALCULATED.3IONS-SCNC: 7 MMOL/L (ref 3–14)
AST SERPL W P-5'-P-CCNC: 17 U/L (ref 0–45)
BILIRUB SERPL-MCNC: 0.5 MG/DL (ref 0.2–1.3)
BUN SERPL-MCNC: 11 MG/DL (ref 7–30)
CALCIUM SERPL-MCNC: 9.7 MG/DL (ref 8.5–10.1)
CHLORIDE BLD-SCNC: 99 MMOL/L (ref 94–109)
CHOLEST SERPL-MCNC: 196 MG/DL
CO2 SERPL-SCNC: 25 MMOL/L (ref 20–32)
CREAT SERPL-MCNC: 0.85 MG/DL (ref 0.52–1.04)
FASTING STATUS PATIENT QL REPORTED: YES
GFR SERPL CREATININE-BSD FRML MDRD: 70 ML/MIN/1.73M2
GLUCOSE BLD-MCNC: 97 MG/DL (ref 70–99)
HDLC SERPL-MCNC: 76 MG/DL
LDLC SERPL CALC-MCNC: 107 MG/DL
NONHDLC SERPL-MCNC: 120 MG/DL
POTASSIUM BLD-SCNC: 4.4 MMOL/L (ref 3.4–5.3)
PROT SERPL-MCNC: 7.1 G/DL (ref 6.8–8.8)
SODIUM SERPL-SCNC: 131 MMOL/L (ref 133–144)
TRIGL SERPL-MCNC: 65 MG/DL

## 2021-10-11 ENCOUNTER — ANCILLARY PROCEDURE (OUTPATIENT)
Dept: CT IMAGING | Facility: CLINIC | Age: 71
End: 2021-10-11
Attending: NURSE PRACTITIONER
Payer: COMMERCIAL

## 2021-10-11 DIAGNOSIS — R91.1 LUNG NODULE: ICD-10-CM

## 2021-10-11 PROCEDURE — 71250 CT THORAX DX C-: CPT | Mod: TC | Performed by: RADIOLOGY

## 2021-11-22 ENCOUNTER — ANCILLARY PROCEDURE (OUTPATIENT)
Dept: MAMMOGRAPHY | Facility: CLINIC | Age: 71
End: 2021-11-22
Attending: NURSE PRACTITIONER
Payer: COMMERCIAL

## 2021-11-22 DIAGNOSIS — Z12.31 VISIT FOR SCREENING MAMMOGRAM: ICD-10-CM

## 2021-11-22 PROCEDURE — 77067 SCR MAMMO BI INCL CAD: CPT | Mod: TC | Performed by: RADIOLOGY

## 2021-12-22 ENCOUNTER — VIRTUAL VISIT (OUTPATIENT)
Dept: FAMILY MEDICINE | Facility: CLINIC | Age: 71
End: 2021-12-22
Payer: COMMERCIAL

## 2021-12-22 DIAGNOSIS — G89.4 CHRONIC PAIN SYNDROME: ICD-10-CM

## 2021-12-22 DIAGNOSIS — F11.90 CHRONIC, CONTINUOUS USE OF OPIOIDS: ICD-10-CM

## 2021-12-22 PROCEDURE — 99213 OFFICE O/P EST LOW 20 MIN: CPT | Mod: 95 | Performed by: NURSE PRACTITIONER

## 2021-12-22 RX ORDER — OXYCODONE HYDROCHLORIDE 5 MG/1
5-10 TABLET ORAL EVERY 4 HOURS PRN
Qty: 150 TABLET | Refills: 0 | Status: SHIPPED | OUTPATIENT
Start: 2021-12-28 | End: 2022-03-21

## 2021-12-22 RX ORDER — OXYCODONE HYDROCHLORIDE 5 MG/1
5-10 TABLET ORAL EVERY 4 HOURS PRN
Qty: 150 TABLET | Refills: 0 | Status: SHIPPED | OUTPATIENT
Start: 2022-01-27 | End: 2022-03-21

## 2021-12-22 RX ORDER — OXYCODONE HYDROCHLORIDE 5 MG/1
5-10 TABLET ORAL EVERY 4 HOURS PRN
Qty: 150 TABLET | Refills: 0 | Status: SHIPPED | OUTPATIENT
Start: 2022-02-26 | End: 2022-03-21

## 2021-12-22 NOTE — PROGRESS NOTES
Shania is a 71 year old who is being evaluated via a billable telephone visit.      What phone number would you like to be contacted at?   How would you like to obtain your AVS?     Assessment & Plan     (G89.4) Chronic pain syndrome  Comment: stable  Plan: oxyCODONE (ROXICODONE) 5 MG tablet, oxyCODONE         (ROXICODONE) 5 MG tablet, oxyCODONE         (ROXICODONE) 5 MG tablet        Refills given.  Follow up in 3 months.     (F11.90) Chronic, continuous use of opioids  Comment:   Plan: oxyCODONE (ROXICODONE) 5 MG tablet, oxyCODONE         (ROXICODONE) 5 MG tablet, oxyCODONE         (ROXICODONE) 5 MG tablet        See above.         23 minutes spent on the date of the encounter doing chart review, patient visit and documentation            No follow-ups on file.    Lisa Merino NP  [facility]   Shania is a 71 year old who presents for the following health issues     HPI       Her pain has been stable.  She denies any side effects on her current medication regimen.          Review of Systems         Objective           Vitals:  No vitals were obtained today due to virtual visit.    Physical Exam   healthy, alert and no distress  PSYCH: Alert and oriented times 3; coherent speech, normal   rate and volume, able to articulate logical thoughts, able   to abstract reason, no tangential thoughts, no hallucinations   or delusions  Her affect is normal  RESP: No cough, no audible wheezing, able to talk in full sentences  Remainder of exam unable to be completed due to telephone visits                Phone call duration: 11 minutes

## 2022-01-24 ENCOUNTER — TELEPHONE (OUTPATIENT)
Dept: FAMILY MEDICINE | Facility: CLINIC | Age: 72
End: 2022-01-24
Payer: COMMERCIAL

## 2022-01-24 NOTE — TELEPHONE ENCOUNTER
Prior Authorization Retail Medication Request    Medication/Dose: hydrOXYzine (ATARAX) 25 MG tablet  ICD code (if different than what is on RX):  Previously Tried and Failed:  Rationale:    Insurance Name:  [employer]  Insurance ID: [id number]    Pharmacy Information (if different than what is on RX)  Name:  Phone:    Please include previous medications tried and failed.  Please ask insurance for medications on formulary.

## 2022-01-27 NOTE — TELEPHONE ENCOUNTER
Central Prior Authorization Team   Phone: [phone]      PA Initiation    Medication: hydrOXYzine (ATARAX) 25 MG tablet,   Insurance Company: [employer] - Phone [phone] Fax [phone]  Pharmacy Filling the Rx: [facility] #[id number] - [place] [address]  Filling Pharmacy Phone: [phone]  Filling Pharmacy Fax:    Start Date: 1/27/2022

## 2022-01-27 NOTE — TELEPHONE ENCOUNTER
Prior Authorization Approval    Authorization Effective Date: 12/28/2021  Authorization Expiration Date: 1/27/2023  Medication: hydrOXYzine (ATARAX) 25 MG tablet, -APPROVED  Approved Dose/Quantity:   Reference #:     Insurance Company: [employer] - Phone [phone] Fax [phone]  Expected CoPay:       CoPay Card Available:      Foundation Assistance Needed:    Which Pharmacy is filling the prescription (Not needed for infusion/clinic administered): [facility] #[id number] - [place] [address] [address]  Pharmacy Notified: Yes  Patient Notified: No    Pharmacy will notify patient when medication is ready.

## 2022-03-21 ENCOUNTER — VIRTUAL VISIT (OUTPATIENT)
Dept: FAMILY MEDICINE | Facility: CLINIC | Age: 72
End: 2022-03-21
Payer: COMMERCIAL

## 2022-03-21 VITALS
DIASTOLIC BLOOD PRESSURE: 75 MMHG | OXYGEN SATURATION: 97 % | SYSTOLIC BLOOD PRESSURE: 123 MMHG | TEMPERATURE: 97.3 F | HEART RATE: 70 BPM

## 2022-03-21 DIAGNOSIS — F33.42 MAJOR DEPRESSIVE DISORDER, RECURRENT, IN FULL REMISSION (H): ICD-10-CM

## 2022-03-21 DIAGNOSIS — J43.9 PULMONARY EMPHYSEMA, UNSPECIFIED EMPHYSEMA TYPE (H): ICD-10-CM

## 2022-03-21 DIAGNOSIS — F11.90 CHRONIC, CONTINUOUS USE OF OPIOIDS: ICD-10-CM

## 2022-03-21 DIAGNOSIS — G89.4 CHRONIC PAIN SYNDROME: ICD-10-CM

## 2022-03-21 PROCEDURE — 99214 OFFICE O/P EST MOD 30 MIN: CPT | Mod: 95 | Performed by: NURSE PRACTITIONER

## 2022-03-21 RX ORDER — OXYCODONE HYDROCHLORIDE 5 MG/1
5-10 TABLET ORAL EVERY 4 HOURS PRN
Qty: 150 TABLET | Refills: 0 | Status: SHIPPED | OUTPATIENT
Start: 2022-05-27 | End: 2022-06-14

## 2022-03-21 RX ORDER — OXYCODONE HYDROCHLORIDE 5 MG/1
5-10 TABLET ORAL EVERY 4 HOURS PRN
Qty: 150 TABLET | Refills: 0 | Status: SHIPPED | OUTPATIENT
Start: 2022-03-28 | End: 2022-06-14

## 2022-03-21 RX ORDER — OXYCODONE HYDROCHLORIDE 5 MG/1
5-10 TABLET ORAL EVERY 4 HOURS PRN
Qty: 150 TABLET | Refills: 0 | Status: SHIPPED | OUTPATIENT
Start: 2022-04-27 | End: 2022-06-14

## 2022-03-21 RX ORDER — OXYCODONE HYDROCHLORIDE 5 MG/1
5-10 TABLET ORAL EVERY 4 HOURS PRN
Qty: 150 TABLET | Refills: 0 | Status: CANCELLED | OUTPATIENT
Start: 2022-03-21

## 2022-03-21 ASSESSMENT — PATIENT HEALTH QUESTIONNAIRE - PHQ9
5. POOR APPETITE OR OVEREATING: NOT AT ALL
SUM OF ALL RESPONSES TO PHQ QUESTIONS 1-9: 0

## 2022-03-21 ASSESSMENT — ANXIETY QUESTIONNAIRES
7. FEELING AFRAID AS IF SOMETHING AWFUL MIGHT HAPPEN: NOT AT ALL
GAD7 TOTAL SCORE: 0
IF YOU CHECKED OFF ANY PROBLEMS ON THIS QUESTIONNAIRE, HOW DIFFICULT HAVE THESE PROBLEMS MADE IT FOR YOU TO DO YOUR WORK, TAKE CARE OF THINGS AT HOME, OR GET ALONG WITH OTHER PEOPLE: NOT DIFFICULT AT ALL
6. BECOMING EASILY ANNOYED OR IRRITABLE: NOT AT ALL
2. NOT BEING ABLE TO STOP OR CONTROL WORRYING: NOT AT ALL
5. BEING SO RESTLESS THAT IT IS HARD TO SIT STILL: NOT AT ALL
1. FEELING NERVOUS, ANXIOUS, OR ON EDGE: NOT AT ALL
3. WORRYING TOO MUCH ABOUT DIFFERENT THINGS: NOT AT ALL

## 2022-03-21 NOTE — PROGRESS NOTES
Shania is a 71 year old who is being evaluated via a billable telephone visit.      What phone number would you like to be contacted at? [phone]  How would you like to obtain your AVS? Mail a copy    Assessment & Plan     (G89.4) Chronic pain syndrome  Comment: stable  Plan: oxyCODONE (ROXICODONE) 5 MG tablet, oxyCODONE         (ROXICODONE) 5 MG tablet, oxyCODONE         (ROXICODONE) 5 MG tablet        The current medical regimen is effective;  continue present plan and medications.    checked.  Refills for 3 months given.  Follow up in 3 months.     (F11.90) Chronic, continuous use of opioids  Comment:   Plan: oxyCODONE (ROXICODONE) 5 MG tablet, oxyCODONE         (ROXICODONE) 5 MG tablet, oxyCODONE         (ROXICODONE) 5 MG tablet        See above.     (J43.9) Pulmonary emphysema, unspecified emphysema type (H)  Comment: stable  Plan: Encouraged smoking cessation.     (F33.42) Major depressive disorder, recurrent, in full remission (H)  Comment: in complete remission  Plan: The current medical regimen is effective;  continue present plan and medications.                  No follow-ups on file.    Lisa Merino NP  [facility]   Shania is a 71 year old who presents for the following health issues     HPI     Chronic/Recurring Back Pain Follow Up      Where is your back pain located? (Select all that apply) neck both and spine    How would you describe your back pain?  dull ache, sharp, shooting and stabbing    Where does your back pain spread? the right and left  thigh, the right and left  knee and the right and left foot    Since your last clinic visit for back pain, how has your pain changed? always present, but gets better and worse    Does your back pain interfere with your job? Not applicable    Since your last visit, have you tried any new treatment? No    Her pain is stable and controlled well on her current regimen of 5 oxycodone per day.    CSA -- Encounter  "Level on 08/04/2015:    Controlled Substance Agreement - Scan on 8/6/2015  2:50 PM: CONTROLLED SUBSTANCE AGREEMENT     CSA -- Patient Level:    Controlled Substance Agreement - Opioid - Scan on 10/4/2021  7:45 PM  Controlled Substance Agreement - Opioid - Scan on 9/2/2020  7:28 AM          Her depression is stable, mood is good.    Her breathing is stable.           Review of Systems         Objective    Vitals - Patient Reported  Weight (Patient Reported): 67.6 kg (149 lb)  Height (Patient Reported): 157.5 cm (5' 2\")  BMI (Based on Pt Reported Ht/Wt): 27.25        Physical Exam   healthy, alert and no distress  PSYCH: Alert and oriented times 3; coherent speech, normal   rate and volume, able to articulate logical thoughts, able   to abstract reason, no tangential thoughts, no hallucinations   or delusions  Her affect is normal  RESP: No cough, no audible wheezing, able to talk in full sentences  Remainder of exam unable to be completed due to telephone visits                Phone call duration: 15 minutes  "

## 2022-03-22 ASSESSMENT — ANXIETY QUESTIONNAIRES: GAD7 TOTAL SCORE: 0

## 2022-05-09 NOTE — TELEPHONE ENCOUNTER
Patient called to inquire about the status of this request. It was already approved last month for a start date of 10/18/2019 but it was sent to the pharmacy more than 30 days ago so they can't honor it. Please resend. Thanks!   Hospital chart (includes HIE)

## 2022-06-14 ENCOUNTER — VIRTUAL VISIT (OUTPATIENT)
Dept: FAMILY MEDICINE | Facility: CLINIC | Age: 72
End: 2022-06-14
Payer: COMMERCIAL

## 2022-06-14 DIAGNOSIS — G89.4 CHRONIC PAIN SYNDROME: ICD-10-CM

## 2022-06-14 DIAGNOSIS — F11.90 CHRONIC, CONTINUOUS USE OF OPIOIDS: ICD-10-CM

## 2022-06-14 DIAGNOSIS — K21.9 GASTROESOPHAGEAL REFLUX DISEASE WITHOUT ESOPHAGITIS: ICD-10-CM

## 2022-06-14 PROCEDURE — 99214 OFFICE O/P EST MOD 30 MIN: CPT | Mod: 95 | Performed by: NURSE PRACTITIONER

## 2022-06-14 RX ORDER — OXYCODONE HYDROCHLORIDE 5 MG/1
5-10 TABLET ORAL EVERY 4 HOURS PRN
Qty: 150 TABLET | Refills: 0 | Status: SHIPPED | OUTPATIENT
Start: 2022-06-26 | End: 2022-10-11

## 2022-06-14 RX ORDER — OXYCODONE HYDROCHLORIDE 5 MG/1
5-10 TABLET ORAL EVERY 4 HOURS PRN
Qty: 150 TABLET | Refills: 0 | Status: SHIPPED | OUTPATIENT
Start: 2022-09-24 | End: 2022-10-11

## 2022-06-14 RX ORDER — OXYCODONE HYDROCHLORIDE 5 MG/1
5-10 TABLET ORAL EVERY 4 HOURS PRN
Qty: 150 TABLET | Refills: 0 | Status: SHIPPED | OUTPATIENT
Start: 2022-07-26 | End: 2022-10-11

## 2022-06-14 RX ORDER — PANTOPRAZOLE SODIUM 40 MG/1
40 TABLET, DELAYED RELEASE ORAL DAILY
Qty: 90 TABLET | Refills: 3 | Status: SHIPPED | OUTPATIENT
Start: 2022-06-14 | End: 2023-04-18

## 2022-06-14 RX ORDER — OXYCODONE HYDROCHLORIDE 5 MG/1
5-10 TABLET ORAL EVERY 4 HOURS PRN
Qty: 150 TABLET | Refills: 0 | Status: SHIPPED | OUTPATIENT
Start: 2022-08-25 | End: 2022-10-11

## 2022-06-14 NOTE — PROGRESS NOTES
"''''''''''''''''Shania is a 71 year old who is being evaluated via a billable telephone visit.      What phone number would you like to be contacted at? [phone]  How would you like to obtain your AVS? Mail a copy    Assessment & Plan     (G89.4) Chronic pain syndrome  Comment: stable  Plan: oxyCODONE (ROXICODONE) 5 MG tablet, oxyCODONE         (ROXICODONE) 5 MG tablet, oxyCODONE         (ROXICODONE) 5 MG tablet, oxyCODONE         (ROXICODONE) 5 MG tablet         checked.  Refills given until her AWV in October.     (F11.90) Chronic, continuous use of opioids  Comment:   Plan: oxyCODONE (ROXICODONE) 5 MG tablet, oxyCODONE         (ROXICODONE) 5 MG tablet, oxyCODONE         (ROXICODONE) 5 MG tablet, oxyCODONE         (ROXICODONE) 5 MG tablet        See above.     (K21.9) Gastroesophageal reflux disease without esophagitis  Comment: stable  Plan: pantoprazole (PROTONIX) 40 MG EC tablet        The current medical regimen is effective;  continue present plan and medications.                BMI:   Estimated body mass index is 29.1 kg/m  as calculated from the following:    Height as of 10/4/21: 1.549 m (5' 1\").    Weight as of 10/4/21: 69.9 kg (154 lb).           No follow-ups on file.    Lisa Merino NP  [facility]    Michael Jauregui is a 71 year old who presents for the following health issues  accompanied by her alone    Vital signs t96.9 wt.148lb bp123/73 p63 sats 98    Medication Followup of oxycodone    Taking Medication as prescribed: yes    Side Effects:  None    Medication Helping Symptoms:  yes    Chronic/Recurring Back Pain Follow Up       Where is your back pain located? (Select all that apply) neck both and spine    How would you describe your back pain?  dull ache, sharp, shooting and stabbing    Where does your back pain spread? the right and left  thigh, the right and left  knee and the right and left foot    Since your last clinic visit for back pain, how has " your pain changed? always present, but gets better and worse    Does your back pain interfere with your job? Not applicable    Since your last visit, have you tried any new treatment? No     Her pain is stable and controlled well on her current regimen of 5 oxycodone per day.    Review of Systems         Objective           Vitals:  No vitals were obtained today due to virtual visit.    Physical Exam   healthy, alert and no distress  PSYCH: Alert and oriented times 3; coherent speech, normal   rate and volume, able to articulate logical thoughts, able   to abstract reason, no tangential thoughts, no hallucinations   or delusions  Her affect is normal  RESP: No cough, no audible wheezing, able to talk in full sentences  Remainder of exam unable to be completed due to telephone visits                Phone call duration: 15 minutes

## 2022-08-30 DIAGNOSIS — F41.9 ANXIETY: ICD-10-CM

## 2022-09-01 RX ORDER — HYDROXYZINE HYDROCHLORIDE 25 MG/1
TABLET, FILM COATED ORAL
Qty: 180 TABLET | Refills: 0 | OUTPATIENT
Start: 2022-09-01

## 2022-09-15 ENCOUNTER — TELEPHONE (OUTPATIENT)
Dept: FAMILY MEDICINE | Facility: CLINIC | Age: 72
End: 2022-09-15

## 2022-09-15 NOTE — TELEPHONE ENCOUNTER
Central Prior Authorization Team   Phone: [phone]      PA Initiation    Medication: (RENEWAL) - methocarbamol (ROBAXIN) 750 MG tablet--INITIATED  Insurance Company: [employer] - Phone [phone] Fax [phone]  Pharmacy Filling the Rx: [facility] #[id number] - [place], [place] - [facility]  Filling Pharmacy Phone: [phone]  Filling Pharmacy Fax:    Start Date: 9/15/2022

## 2022-09-15 NOTE — TELEPHONE ENCOUNTER
Prior Authorization Retail Medication Request    Medication/Dose: (RENEWAL) - methocarbamol (ROBAXIN) 750 MG tablet  ICD code (if different than what is on RX):  Chronic pain syndrome [G89.4]   Previously Tried and Failed:   Rationale:     Insurance Name: [employer]  Insurance ID: [id number]     Pharmacy Information (if different than what is on RX)  Name:  [facility] #[id number] - [place], [place] - [facility]  Phone:  [phone]

## 2022-09-16 NOTE — TELEPHONE ENCOUNTER
Central Prior Authorization Team   Phone: [phone]      Prior Authorization Approval    Authorization Effective Date: 8/16/2022  Authorization Expiration Date: 9/16/2023  Medication: (RENEWAL) - methocarbamol (ROBAXIN) 750 MG tablet--APPROVED  Approved Dose/Quantity:    Reference #:     Insurance Company: [employer] - Phone [phone] Fax [phone]  Expected CoPay:       CoPay Card Available:      Foundation Assistance Needed:    Which Pharmacy is filling the prescription (Not needed for infusion/clinic administered): [facility] #[id number] - [place], [place] - [facility] [address]  Pharmacy Notified: Yes  Patient Notified: Yes PHARMACY WILL CONTACT WHEN FILLED

## 2022-10-11 ENCOUNTER — OFFICE VISIT (OUTPATIENT)
Dept: FAMILY MEDICINE | Facility: CLINIC | Age: 72
End: 2022-10-11
Payer: COMMERCIAL

## 2022-10-11 VITALS
DIASTOLIC BLOOD PRESSURE: 78 MMHG | OXYGEN SATURATION: 94 % | SYSTOLIC BLOOD PRESSURE: 131 MMHG | WEIGHT: 152 LBS | TEMPERATURE: 97.9 F | RESPIRATION RATE: 16 BRPM | HEIGHT: 62 IN | BODY MASS INDEX: 27.97 KG/M2 | HEART RATE: 78 BPM

## 2022-10-11 DIAGNOSIS — J43.9 PULMONARY EMPHYSEMA, UNSPECIFIED EMPHYSEMA TYPE (H): ICD-10-CM

## 2022-10-11 DIAGNOSIS — Z00.00 ENCOUNTER FOR MEDICARE ANNUAL WELLNESS EXAM: Primary | ICD-10-CM

## 2022-10-11 DIAGNOSIS — M21.611 BUNION, RIGHT: ICD-10-CM

## 2022-10-11 DIAGNOSIS — Z87.891 PERSONAL HISTORY OF TOBACCO USE: ICD-10-CM

## 2022-10-11 DIAGNOSIS — Z79.899 ENCOUNTER FOR LONG-TERM (CURRENT) USE OF MEDICATIONS: ICD-10-CM

## 2022-10-11 DIAGNOSIS — F11.90 CHRONIC, CONTINUOUS USE OF OPIOIDS: ICD-10-CM

## 2022-10-11 DIAGNOSIS — F41.9 ANXIETY: ICD-10-CM

## 2022-10-11 DIAGNOSIS — E78.5 HYPERLIPIDEMIA LDL GOAL <130: ICD-10-CM

## 2022-10-11 DIAGNOSIS — Z72.0 TOBACCO ABUSE: ICD-10-CM

## 2022-10-11 DIAGNOSIS — G89.4 CHRONIC PAIN SYNDROME: ICD-10-CM

## 2022-10-11 DIAGNOSIS — F33.42 MAJOR DEPRESSIVE DISORDER, RECURRENT, IN FULL REMISSION (H): ICD-10-CM

## 2022-10-11 LAB
ALBUMIN SERPL-MCNC: 4.1 G/DL (ref 3.4–5)
ALP SERPL-CCNC: 85 U/L (ref 40–150)
ALT SERPL W P-5'-P-CCNC: 19 U/L (ref 0–50)
AMPHETAMINES UR QL: NOT DETECTED
ANION GAP SERPL CALCULATED.3IONS-SCNC: 5 MMOL/L (ref 3–14)
AST SERPL W P-5'-P-CCNC: 15 U/L (ref 0–45)
BARBITURATES UR QL SCN: NOT DETECTED
BENZODIAZ UR QL SCN: NOT DETECTED
BILIRUB SERPL-MCNC: 0.4 MG/DL (ref 0.2–1.3)
BUN SERPL-MCNC: 7 MG/DL (ref 7–30)
BUPRENORPHINE UR QL: NOT DETECTED
CALCIUM SERPL-MCNC: 9.5 MG/DL (ref 8.5–10.1)
CANNABINOIDS UR QL: NOT DETECTED
CHLORIDE BLD-SCNC: 104 MMOL/L (ref 94–109)
CHOLEST SERPL-MCNC: 194 MG/DL
CO2 SERPL-SCNC: 26 MMOL/L (ref 20–32)
COCAINE UR QL SCN: NOT DETECTED
CREAT SERPL-MCNC: 0.82 MG/DL (ref 0.52–1.04)
D-METHAMPHET UR QL: NOT DETECTED
FASTING STATUS PATIENT QL REPORTED: YES
GFR SERPL CREATININE-BSD FRML MDRD: 76 ML/MIN/1.73M2
GLUCOSE BLD-MCNC: 104 MG/DL (ref 70–99)
HDLC SERPL-MCNC: 79 MG/DL
LDLC SERPL CALC-MCNC: 99 MG/DL
METHADONE UR QL SCN: NOT DETECTED
NONHDLC SERPL-MCNC: 115 MG/DL
OPIATES UR QL SCN: NOT DETECTED
OXYCODONE UR QL SCN: DETECTED
PCP UR QL SCN: NOT DETECTED
POTASSIUM BLD-SCNC: 4.4 MMOL/L (ref 3.4–5.3)
PROPOXYPH UR QL: NOT DETECTED
PROT SERPL-MCNC: 7.4 G/DL (ref 6.8–8.8)
SODIUM SERPL-SCNC: 135 MMOL/L (ref 133–144)
TRICYCLICS UR QL SCN: NOT DETECTED
TRIGL SERPL-MCNC: 82 MG/DL

## 2022-10-11 PROCEDURE — G0008 ADMIN INFLUENZA VIRUS VAC: HCPCS | Performed by: NURSE PRACTITIONER

## 2022-10-11 PROCEDURE — G0439 PPPS, SUBSEQ VISIT: HCPCS | Performed by: NURSE PRACTITIONER

## 2022-10-11 PROCEDURE — 36415 COLL VENOUS BLD VENIPUNCTURE: CPT | Performed by: NURSE PRACTITIONER

## 2022-10-11 PROCEDURE — 90662 IIV NO PRSV INCREASED AG IM: CPT | Performed by: NURSE PRACTITIONER

## 2022-10-11 PROCEDURE — G0296 VISIT TO DETERM LDCT ELIG: HCPCS | Performed by: NURSE PRACTITIONER

## 2022-10-11 PROCEDURE — 80053 COMPREHEN METABOLIC PANEL: CPT | Performed by: NURSE PRACTITIONER

## 2022-10-11 PROCEDURE — 80061 LIPID PANEL: CPT | Performed by: NURSE PRACTITIONER

## 2022-10-11 PROCEDURE — 99214 OFFICE O/P EST MOD 30 MIN: CPT | Mod: 25 | Performed by: NURSE PRACTITIONER

## 2022-10-11 PROCEDURE — 80306 DRUG TEST PRSMV INSTRMNT: CPT | Performed by: NURSE PRACTITIONER

## 2022-10-11 RX ORDER — OXYCODONE HYDROCHLORIDE 5 MG/1
5-10 TABLET ORAL EVERY 4 HOURS PRN
Qty: 150 TABLET | Refills: 0 | Status: SHIPPED | OUTPATIENT
Start: 2022-11-23 | End: 2023-01-16

## 2022-10-11 RX ORDER — OXYCODONE HYDROCHLORIDE 5 MG/1
5-10 TABLET ORAL EVERY 4 HOURS PRN
Qty: 150 TABLET | Refills: 0 | Status: SHIPPED | OUTPATIENT
Start: 2022-10-24 | End: 2023-01-16

## 2022-10-11 RX ORDER — HYDROXYZINE HYDROCHLORIDE 25 MG/1
TABLET, FILM COATED ORAL
Qty: 180 TABLET | Refills: 3 | Status: SHIPPED | OUTPATIENT
Start: 2022-10-11 | End: 2023-12-27

## 2022-10-11 RX ORDER — PAROXETINE 20 MG/1
10 TABLET, FILM COATED ORAL DAILY
Qty: 45 TABLET | Refills: 3 | Status: SHIPPED | OUTPATIENT
Start: 2022-10-11 | End: 2023-09-26

## 2022-10-11 RX ORDER — OXYCODONE HYDROCHLORIDE 5 MG/1
5-10 TABLET ORAL EVERY 4 HOURS PRN
Qty: 150 TABLET | Refills: 0 | Status: SHIPPED | OUTPATIENT
Start: 2022-12-23 | End: 2023-01-16

## 2022-10-11 RX ORDER — SIMVASTATIN 20 MG
TABLET ORAL
Qty: 90 TABLET | Refills: 3 | Status: SHIPPED | OUTPATIENT
Start: 2022-10-11 | End: 2023-10-25

## 2022-10-11 RX ORDER — ALBUTEROL SULFATE 90 UG/1
AEROSOL, METERED RESPIRATORY (INHALATION)
Qty: 8.5 G | Status: SHIPPED | OUTPATIENT
Start: 2022-10-11 | End: 2023-10-23

## 2022-10-11 ASSESSMENT — ENCOUNTER SYMPTOMS
PALPITATIONS: 0
ABDOMINAL PAIN: 0
CHILLS: 0
JOINT SWELLING: 0
WEAKNESS: 0
NERVOUS/ANXIOUS: 0
SORE THROAT: 0
NAUSEA: 0
DIZZINESS: 0
EYE PAIN: 0
FREQUENCY: 0
HEMATOCHEZIA: 0
DYSURIA: 0
FEVER: 0
CONSTIPATION: 1
MYALGIAS: 1
HEMATURIA: 0
BREAST MASS: 0
COUGH: 0
ARTHRALGIAS: 1
PARESTHESIAS: 0
HEARTBURN: 0
DIARRHEA: 0
SHORTNESS OF BREATH: 0
HEADACHES: 0

## 2022-10-11 ASSESSMENT — ANXIETY QUESTIONNAIRES
2. NOT BEING ABLE TO STOP OR CONTROL WORRYING: NOT AT ALL
1. FEELING NERVOUS, ANXIOUS, OR ON EDGE: NOT AT ALL
7. FEELING AFRAID AS IF SOMETHING AWFUL MIGHT HAPPEN: NOT AT ALL
GAD7 TOTAL SCORE: 0
4. TROUBLE RELAXING: NOT AT ALL
6. BECOMING EASILY ANNOYED OR IRRITABLE: NOT AT ALL
8. IF YOU CHECKED OFF ANY PROBLEMS, HOW DIFFICULT HAVE THESE MADE IT FOR YOU TO DO YOUR WORK, TAKE CARE OF THINGS AT HOME, OR GET ALONG WITH OTHER PEOPLE?: NOT DIFFICULT AT ALL
7. FEELING AFRAID AS IF SOMETHING AWFUL MIGHT HAPPEN: NOT AT ALL
IF YOU CHECKED OFF ANY PROBLEMS ON THIS QUESTIONNAIRE, HOW DIFFICULT HAVE THESE PROBLEMS MADE IT FOR YOU TO DO YOUR WORK, TAKE CARE OF THINGS AT HOME, OR GET ALONG WITH OTHER PEOPLE: NOT DIFFICULT AT ALL
3. WORRYING TOO MUCH ABOUT DIFFERENT THINGS: NOT AT ALL
GAD7 TOTAL SCORE: 0
GAD7 TOTAL SCORE: 0
5. BEING SO RESTLESS THAT IT IS HARD TO SIT STILL: NOT AT ALL

## 2022-10-11 ASSESSMENT — ACTIVITIES OF DAILY LIVING (ADL): CURRENT_FUNCTION: NO ASSISTANCE NEEDED

## 2022-10-11 ASSESSMENT — PAIN SCALES - GENERAL: PAINLEVEL: EXTREME PAIN (8)

## 2022-10-11 NOTE — PATIENT INSTRUCTIONS
Patient Education   Personalized Prevention Plan  You are due for the preventive services outlined below.  Your care team is available to assist you in scheduling these services.  If you have already completed any of these items, please share that information with your care team to update in your medical record.  Health Maintenance Due   Topic Date Due     Hepatitis B Vaccine (1 of 3 - 3-dose series) Never done     Zoster (Shingles) Vaccine (1 of 2) Never done     COVID-19 Vaccine (4 - Booster for Moderna series) 01/31/2022     Flu Vaccine (1) 09/01/2022     URINE DRUG SCREEN  10/04/2022     ANNUAL REVIEW OF HM ORDERS  10/04/2022     FALL RISK ASSESSMENT  10/04/2022     TREATMENT AGREEMENT FOR CHRONIC PAIN MANAGEMENT  10/04/2022     LUNG CANCER SCREENING  10/11/2022     Talk to your care team about options to quit tobacco use.  10/04/2022     Annual Wellness Visit  10/04/2022        Lung Cancer Screening   Frequently Asked Questions  If you are at high-risk for lung cancer, getting screened with low-dose computed tomography (LDCT) every year can help save your life. This handout offers answers to some of the most common questions about lung cancer screening. If you have other questions, please call [phone] ([phone]).     What is it?  Lung cancer screening uses special X-ray technology to create an image of your lung tissue. The exam is quick and easy and takes less than 10 seconds. We don t give you any medicine or use any needles. You can eat before and after the exam. You don t need to change your clothes as long as the clothing on your chest doesn t contain metal. But, you do need to be able to hold your breath for at least 6 seconds during the exam.    What is the goal of lung cancer screening?  The goal of lung cancer screening is to save lives. Many times, lung cancer is not found until a person starts having physical symptoms. Lung cancer screening can help detect lung cancer in the  earliest stages when it may be easier to treat.    Who should be screened for lung cancer?  We suggest lung cancer screening for anyone who is at high-risk for lung cancer. You are in the high-risk group if you:      are between the ages of 55 and 79, and    have smoked at least 1 pack of cigarettes a day for 20 or more years, and    still smoke or have quit within the past 15 years.    However, if you have a new cough or shortness of breath, you should talk to your doctor before being screened.    Why does it matter if I have symptoms?  Certain symptoms can be a sign that you have a condition in your lungs that should be checked and treated by your doctor. These symptoms include fever, chest pain, a new or changing cough, shortness of breath that you have never felt before, coughing up blood or unexplained weight loss. Having any of these symptoms can greatly affect the results of lung cancer screening.       Should all smokers get an LDCT lung cancer screening exam?  It depends. Lung cancer screening is for a very specific group of men and women who have a history of heavy smoking over a long period of time (see  Who should be screened for lung cancer  above).  I am in the high-risk group, but have been diagnosed with cancer in the past. Is LDCT lung cancer screening right for me?  In some cases, you should not have LDCT lung screening, such as when your doctor is already following your cancer with CT scan studies. Your doctor will help you decide if LDCT lung screening is right for you.  Do I need to have a screening exam every year?  Yes. If you are in the high-risk group described earlier, you should get an LDCT lung cancer screening exam every year until you are 79, or are no longer willing or able to undergo screening and possible procedures to diagnose and treat lung cancer.  How effective is LDCT at preventing death from lung cancer?  Studies have shown that LDCT lung cancer screening can lower the risk of  death from lung cancer by 20 percent in people who are at high-risk.  What are the risks?  There are some risks and limitations of LDCT lung cancer screening. We want to make sure you understand the risks and benefits, so please let us know if you have any questions. Your doctor may want to talk with you more about these risks.    Radiation exposure: As with any exam that uses radiation, there is a very small increased risk of cancer. The amount of radiation in LDCT is small--about the same amount a person would get from a mammogram. Your doctor orders the exam when he or she feels the potential benefits outweigh the risks.    False negatives: No test is perfect, including LDCT. It is possible that you may have a medical condition, including lung cancer, that is not found during your exam. This is called a false negative result.    False positives and more testing: LDCT very often finds something in the lung that could be cancer, but in fact is not. This is called a false positive result. False positive tests often cause anxiety. To make sure these findings are not cancer, you may need to have more tests. These tests will be done only if you give us permission. Sometimes patients need a treatment that can have side effects, such as a biopsy. For more information on false positives, see  What can I expect from the results?     Findings not related to lung cancer: Your LDCT exam also takes pictures of areas of your body next to your lungs. In a very small number of cases, the CT scan will show an abnormal finding in one of these areas, such as your kidneys, adrenal glands, liver or thyroid. This finding may not be serious, but you may need more tests. Your doctor can help you decide what other tests you may need, if any.  What can I expect from the results?  About 1 out of 4 LDCT exams will find something that may need more tests. Most of the time, these findings are lung nodules. Lung nodules are very small  collections of tissue in the lung. These nodules are very common, and the vast majority--more than 97 percent--are not cancer (benign). Most are normal lymph nodes or small areas of scarring from past infections.  But, if a small lung nodule is found to be cancer, the cancer can be cured more than 90 percent of the time. To know if the nodule is cancer, we may need to get more images before your next yearly screening exam. If the nodule has suspicious features (for example, it is large, has an odd shape or grows over time), we will refer you to a specialist for further testing.  Will my doctor also get the results?  Yes. Your doctor will get a copy of your results.  Is it okay to keep smoking now that there s a cancer screening exam?  No. Tobacco is one of the strongest cancer-causing agents. It causes not only lung cancer, but other cancers and cardiovascular (heart) diseases as well. The damage caused by smoking builds over time. This means that the longer you smoke, the higher your risk of disease. While it is never too late to quit, the sooner you quit, the better.  Where can I find help to quit smoking?  The best way to prevent lung cancer is to stop smoking. If you have already quit smoking, congratulations and keep it up! For help on quitting smoking, please call [employer] at 5-731-QUITNOW (1-528.509.6240) or the American [employer] Society at 1-250.866.3818 to find local resources near you.  One-on-one health coaching:  If you d prefer to work individually with a health care provider on tobacco cessation, we offer:      Medication Therapy Management:  Our specially trained pharmacists work closely with you and your doctor to help you quit smoking.  Call [phone] or [phone] (toll free).

## 2022-10-11 NOTE — LETTER
"October 12, 2022      Shania Holliday  [address] [place] [address] [address] [place] [address]        Dear ,    We are writing to inform you of your test results.    Cholesterol level looks good.   Blood sugar is borderline elevated, which is considered \"pre-diabetes\".  Working on a low-carb diet, especially avoiding simple carbohydrates such as white bread, white rice, pasta, and sweets; as well as regular exercise can help prevent the progression to diabetes.   All other labs are normal.       Resulted Orders   Drug Abuse Screen Panel 13, Urine (Pain Care Package) - lab collect   Result Value Ref Range    Cannabinoids (59-vsq-2-carboxy-9-THC) Not Detected Not Detected, Indeterminate      Comment:      Cutoff for a negative cannabinoid is 50 ng/mL or less.    Phencyclidine Not Detected Not Detected, Indeterminate      Comment:      Cutoff for a negative PCP is 25 ng/mL or less.    Cocaine (Benzoylecgonine) Not Detected Not Detected, Indeterminate      Comment:      Cutoff for a negative cocaine is 150 ng/ml or less.    Methamphetamine (d-Methamphetamine) Not Detected Not Detected, Indeterminate      Comment:      Cutoff for a negative methamphetamine is 500 ng/ml or less.    Opiates (Morphine) Not Detected Not Detected, Indeterminate      Comment:      Cutoff for a negative opiate is 100 ng/ml or less.    Amphetamine (d-Amphetamine) Not Detected Not Detected, Indeterminate      Comment:      Cutoff for a negative amphetamine is 500 ng/mL or less.    Benzodiazepines (Nordiazepam) Not Detected Not Detected, Indeterminate      Comment:      Cutoff for a negative benzodiazepine is 150 ng/ml or less.    Tricyclic Antidepressants (Desipramine) Not Detected Not Detected, Indeterminate      Comment:      Cutoff for a negative tricyclic antidepressant is 300 ng/ml or less.    Methadone Not Detected Not Detected, Indeterminate      Comment:      Cutoff for a negative methadone is 200 ng/ml or less.    Barbiturates (Butalbital) Not " Detected Not Detected, Indeterminate      Comment:      Cutoff for a negative barbituate is 200 ng/ml or less.    Oxycodone Detected (A) Not Detected, Indeterminate      Comment:      Cutoff for a positive oxycodone is greater than 100 ng/ml.  This is an unconfirmed screening result to be used for medical purposes only.     Propoxyphene (Norpropoxyphene) Not Detected Not Detected, Indeterminate      Comment:      Cutoff for a negative propoxyphene is 300 ng/ml or less.    Buprenorphine Not Detected Not Detected, Indeterminate      Comment:      Cutoff for a negative buprenorphine is 10 ng/ml or less.   Comprehensive metabolic panel (BMP + Alb, Alk Phos, ALT, AST, Total. Bili, TP)   Result Value Ref Range    Sodium 135 133 - 144 mmol/L    Potassium 4.4 3.4 - 5.3 mmol/L    Chloride 104 94 - 109 mmol/L    Carbon Dioxide (CO2) 26 20 - 32 mmol/L    Anion Gap 5 3 - 14 mmol/L    Urea Nitrogen 7 7 - 30 mg/dL    Creatinine 0.82 0.52 - 1.04 mg/dL    Calcium 9.5 8.5 - 10.1 mg/dL    Glucose 104 (H) 70 - 99 mg/dL    Alkaline Phosphatase 85 40 - 150 U/L    AST 15 0 - 45 U/L    ALT 19 0 - 50 U/L    Protein Total 7.4 6.8 - 8.8 g/dL    Albumin 4.1 3.4 - 5.0 g/dL    Bilirubin Total 0.4 0.2 - 1.3 mg/dL    GFR Estimate 76 >60 mL/min/1.73m2      Comment:      Effective December 21, 2021 eGFRcr in adults is calculated using the 2021 CKD-EPI creatinine equation which includes age and gender (Brittney laboy al., NEJM, DOI: 10.1056/ATCYiy2044703)   Lipid panel reflex to direct LDL Fasting   Result Value Ref Range    Cholesterol 194 <200 mg/dL    Triglycerides 82 <150 mg/dL    Direct Measure HDL 79 >=50 mg/dL    LDL Cholesterol Calculated 99 <=100 mg/dL    Non HDL Cholesterol 115 <130 mg/dL    Patient Fasting > 8hrs? Yes     Narrative    Cholesterol  Desirable:  <200 mg/dL    Triglycerides  Normal:  Less than 150 mg/dL  Borderline High:  150-199 mg/dL  High:  200-499 mg/dL  Very High:  Greater than or equal to 500 mg/dL    Direct Measure  HDL  Female:  Greater than or equal to 50 mg/dL   Male:  Greater than or equal to 40 mg/dL    LDL Cholesterol  Desirable:  <100mg/dL  Above Desirable:  100-129 mg/dL   Borderline High:  130-159 mg/dL   High:  160-189 mg/dL   Very High:  >= 190 mg/dL    Non HDL Cholesterol  Desirable:  130 mg/dL  Above Desirable:  130-159 mg/dL  Borderline High:  160-189 mg/dL  High:  190-219 mg/dL  Very High:  Greater than or equal to 220 mg/dL       If you have any questions or concerns, please call the clinic at the number listed above.       Sincerely,      Lisa Merino NP

## 2022-10-11 NOTE — PROGRESS NOTES
SUBJECTIVE:   Shania is a 72 year old who presents for Preventive Visit.  Patient has been advised of split billing requirements and indicates understanding: Yes  Are you in the first 12 months of your Medicare coverage?  No    HPI  Do you feel safe in your environment? Yes    Have you ever done Advance Care Planning? (For example, a Health Directive, POLST, or a discussion with a medical provider or your loved ones about your wishes): Yes, advance care planning is on file.       Fall risk  Fallen 2 or more times in the past year?: No  Any fall with injury in the past year?: Yes    Cognitive Screening   1) Repeat 3 items (Leader, Season, Table)    2) Clock draw: NORMAL  3) 3 item recall: Recalls 1 object   Results: NORMAL clock, 1-2 items recalled: COGNITIVE IMPAIRMENT LESS LIKELY    Mini-CogTM Copyright S Cha. Licensed by the author for use in [facility]; reprinted with permission (maxine@[email or web address]). All rights reserved.      Do you have sleep apnea, excessive snoring or daytime drowsiness?: no    Reviewed and updated as needed this visit by clinical staff   Tobacco   Meds   Med Hx  Surg Hx  Fam Hx  Soc Hx        Reviewed and updated as needed this visit by Provider                 Social History     Tobacco Use     Smoking status: Every Day     Packs/day: 0.50     Years: 38.00     Pack years: 19.00     Types: Cigarettes     Smokeless tobacco: Never     Tobacco comments:     4-5 per day.    Substance Use Topics     Alcohol use: No     Alcohol/week: 0.0 standard drinks     If you drink alcohol do you typically have >3 drinks per day or >7 drinks per week? No    Alcohol Use 10/11/2022   Prescreen: >3 drinks/day or >7 drinks/week? Not Applicable   Prescreen: >3 drinks/day or >7 drinks/week? -       Current providers sharing in care for this patient include:   Patient Care Team:  Lisa Merino NP as PCP - General  Lisa Merino NP as Assigned PCP    The following health maintenance items are  "reviewed in Epic and correct as of today:  Health Maintenance   Topic Date Due     HEPATITIS B IMMUNIZATION (1 of 3 - 3-dose series) Never done     ZOSTER IMMUNIZATION (1 of 2) Never done     COVID-19 Vaccine (4 - Booster for Moderna series) 01/31/2022     URINE DRUG SCREEN  10/04/2022     TREATMENT AGREEMENT FOR CHRONIC PAIN MANAGEMENT  10/04/2022     LUNG CANCER SCREENING  10/11/2022     MEDICARE ANNUAL WELLNESS VISIT  10/04/2022     PHQ-9  04/11/2023     DTAP/TDAP/TD IMMUNIZATION (3 - Td or Tdap) 07/30/2023     NICOTINE/TOBACCO CESSATION COUNSELING Q 1 YR  10/11/2023     NASRA ASSESSMENT  10/11/2023     ANNUAL REVIEW OF HM ORDERS  10/11/2023     FALL RISK ASSESSMENT  10/11/2023     MAMMO SCREENING  11/22/2023     COLORECTAL CANCER SCREENING  12/15/2024     LIPID  10/04/2026     ADVANCE CARE PLANNING  10/11/2027     DEXA  09/29/2029     SPIROMETRY  Completed     HEPATITIS C SCREENING  Completed     DEPRESSION ACTION PLAN  Completed     INFLUENZA VACCINE  Completed     Pneumococcal Vaccine: 65+ Years  Completed     COPD ACTION PLAN  Addressed     IPV IMMUNIZATION  Aged Out     MENINGITIS IMMUNIZATION  Aged Out       Her chronic pain has been stable overall, but she did fall in July and it flared up her back pain.  She denies any side effects on her current medication regimen.      Her mood is stable, she denies any current symptoms of depression.  She is doing well on her current dose of Paxil.  Hydroxyzine at bedtime does help with her anxiety.     Her COPD is stable.  She is still smoking.     Her GERD is well-controlled on Protonix.          Review of Systems      OBJECTIVE:   /78   Pulse 78   Temp 97.9  F (36.6  C) (Temporal)   Resp 16   Ht 1.562 m (5' 1.5\")   Wt 68.9 kg (152 lb)   LMP  (LMP Unknown)   SpO2 94%   BMI 28.26 kg/m   Estimated body mass index is 28.26 kg/m  as calculated from the following:    Height as of this encounter: 1.562 m (5' 1.5\").    Weight as of this encounter: 68.9 kg (152 " lb).  Physical Exam  GENERAL: healthy, alert and no distress  EYES: Eyes grossly normal to inspection, PERRL and conjunctivae and sclerae normal  HENT: ear canals and TM's normal, nose and mouth without ulcers or lesions  NECK: no adenopathy, no asymmetry, masses, or scars and thyroid normal to palpation  RESP: lungs clear to auscultation - no rales, rhonchi or wheezes  CV: regular rate and rhythm, normal S1 S2, no S3 or S4, no murmur, click or rub, no peripheral edema and peripheral pulses strong  ABDOMEN: soft, nontender, no hepatosplenomegaly, no masses and bowel sounds normal  MS: decreased ROM of the lumbar spine  SKIN: no suspicious lesions or rashes  NEURO: Normal strength and tone, mentation intact and speech normal  PSYCH: mentation appears normal, affect normal/bright        ASSESSMENT / PLAN:   (Z00.00) Encounter for Medicare annual wellness exam  (primary encounter diagnosis)  Comment:   Plan:  She declines COVID booster.     (Z79.899) Encounter for long-term (current) use of medications  Comment:   Plan:     (Z87.891) Personal history of tobacco use  Comment:   Plan: Prof fee: Shared Decision Making for Lung         Cancer Screening, CT Chest Lung Cancer Scrn Low        Dose wo            (M21.611) Bunion, right  Comment:   Plan: Orthopedic  Referral        Referral to podiatry given.      (G89.4) Chronic pain syndrome  Comment: stable  Plan: Drug Abuse Screen Panel 13, Urine (Pain Care         Package) - lab collect, oxyCODONE (ROXICODONE)         5 MG tablet, oxyCODONE (ROXICODONE) 5 MG         tablet, oxyCODONE (ROXICODONE) 5 MG tablet         checked.  New CSA reviewed and signed.  Refills given for 3 months.  Follow up phone visit in 3 months.     (Z72.0) Tobacco abuse  Comment:   Plan: albuterol (PROAIR HFA) 108 (90 Base) MCG/ACT         inhaler            (F41.9) Anxiety  Comment: stable  Plan: hydrOXYzine (ATARAX) 25 MG tablet        The current medical regimen is effective;   "continue present plan and medications.     (F33.42) Major depressive disorder, recurrent, in full remission (H)  Comment: in complete remission  Plan: PARoxetine (PAXIL) 20 MG tablet        The current medical regimen is effective;  continue present plan and medications.     (E78.5) Hyperlipidemia LDL goal <130  Comment: stable  Plan: Comprehensive metabolic panel (BMP + Alb, Alk         Phos, ALT, AST, Total. Bili, TP), Lipid panel         reflex to direct LDL Fasting, simvastatin         (ZOCOR) 20 MG tablet        The current medical regimen is effective;  continue present plan and medications.     (F11.90) Chronic, continuous use of opioids  Comment: stable  Plan: oxyCODONE (ROXICODONE) 5 MG tablet, oxyCODONE         (ROXICODONE) 5 MG tablet, oxyCODONE         (ROXICODONE) 5 MG tablet        See above.     COPD  Stable  Plan: Encouraged smoking cessation.  Refill of albuterol given.           COUNSELING:  Reviewed preventive health counseling, as reflected in patient instructions    Estimated body mass index is 28.26 kg/m  as calculated from the following:    Height as of this encounter: 1.562 m (5' 1.5\").    Weight as of this encounter: 68.9 kg (152 lb).        She reports that she has been smoking cigarettes. She has a 19.00 pack-year smoking history. She has never used smokeless tobacco.  Nicotine/Tobacco Cessation Plan:   Information offered: Patient not interested at this time      Appropriate preventive services were discussed with this patient, including applicable screening as appropriate for cardiovascular disease, diabetes, osteopenia/osteoporosis, and glaucoma.  As appropriate for age/gender, discussed screening for colorectal cancer, prostate cancer, breast cancer, and cervical cancer. Checklist reviewing preventive services available has been given to the patient.    Reviewed patients plan of care and provided an AVS. The Basic Care Plan (routine screening as documented in Health Maintenance) for " Shania meets the Care Plan requirement. This Care Plan has been established and reviewed with the Patient.    Counseling Resources:  ATP IV Guidelines  Pooled Cohorts Equation Calculator  Breast Cancer Risk Calculator  Breast Cancer: Medication to Reduce Risk  FRAX Risk Assessment  ICSI Preventive Guidelines  Dietary Guidelines for Americans, 2010  [employer]'s MyPlate  ASA Prophylaxis  Lung CA Screening    Lisa Merino NP  [facility]    Identified Health Risks:  Lung Cancer Screening Shared Decision Making Visit     Shania Holliday, a 72 year old female, is eligible for lung cancer screening    History   Smoking Status     Every Day     Packs/day: 0.50     Years: 38.00     Types: Cigarettes   Smokeless Tobacco     Never     Comment: 4-5 per day.        I have discussed with patient the risks and benefits of screening for lung cancer with low-dose CT.     The risks include:    radiation exposure: one low dose chest CT has as much ionizing radiation as about 15 chest x-rays, or 6 months of background radiation living in [place]      false positives: most findings/nodules are NOT cancer, but some might still require additional diagnostic evaluation, including biopsy    over-diagnosis: some slow growing cancers that might never have been clinically significant will be detected and treated unnecessarily     The benefit of early detection of lung cancer is contingent upon adherence to annual screening or more frequent follow up if indicated.     Furthermore, to benefit from screening, Shania must be willing and able to undergo diagnostic procedures, if indicated. Although no specific guide is available for determining severity of comorbidities, it is reasonable to withhold screening in patients who have greater mortality risk from other diseases.     We did discuss that the best way to prevent lung cancer is to not smoke.    Some patients may value a numeric estimation of lung cancer risk when  evaluating if lung cancer screening is right for them, here is one calculator:    ShouldIScreen

## 2022-10-11 NOTE — LETTER
Opioid / Opioid Plus Controlled Substance Agreement    This is an agreement between you and your provider about the safe and appropriate use of controlled substance/opioids prescribed by your care team. Controlled substances are medicines that can cause physical and mental dependence (abuse).    There are strict laws about having and using these medicines. We here at [facility] are committing to working with you in your efforts to get better. To support you in this work, we ll help you schedule regular office appointments for medicine refills. If we must cancel or change your appointment for any reason, we ll make sure you have enough medicine to last until your next appointment.     As a Provider, I will:    Listen carefully to your concerns and treat you with respect.     Recommend a treatment plan that I believe is in your best interest. This plan may involve therapies other than opioid pain medication.     Talk with you often about the possible benefits, and the risk of harm of any medicine that we prescribe for you.     Provide a plan on how to taper (discontinue or go off) using this medicine if the decision is made to stop its use.    As a Patient, I understand that opioid(s):     Are a controlled substance prescribed by my care team to help me function or work and manage my condition(s).     Are strong medicines and can cause serious side effects such as:    Drowsiness, which can seriously affect my driving ability    A lower breathing rate, enough to cause death    Harm to my thinking ability     Depression     Abuse of and addiction to this medicine    Need to be taken exactly as prescribed. Combining opioids with certain medicines or chemicals (such as illegal drugs, sedatives, sleeping pills, and benzodiazepines) can be dangerous or even fatal. If I stop opioids suddenly, I may have severe withdrawal symptoms.    Do not work for all types of pain nor for all patients. If they re not helpful, I may  be asked to stop them.        The risks, benefits and side effects of these medicine(s) were explained to me. I agree that:  1. I will take part in other treatments as advised by my care team. This may be psychiatry or counseling, physical therapy, behavioral therapy, group treatment or a referral to a specialist.     2. I will keep all my appointments. I understand that this is part of the monitoring of opioids. My care team may require an office visit for EVERY opioid/controlled substance refill. If I miss appointments or don t follow instructions, my care team may stop my medicine.    3. I will take my medicines as prescribed. I will not change the dose or schedule unless my care team tells me to. There will be no refills if I run out early.     4. I may be asked to come to the clinic and complete a urine drug test or complete a pill count at any time. If I don t give a urine sample or participate in a pill count, the care team may stop my medicine.    5. I will only receive prescriptions from this clinic for chronic pain. If I am treated by another provider for acute pain issues, I will tell them that I am taking opioid pain medication for chronic pain and that I have a treatment agreement with this provider. I will inform my [facility] care team within one business day if I am given a prescription for any pain medication by another healthcare provider. My [facility] care team can contact other providers and pharmacists about my use of any medicines.    6. It is up to me to make sure that I don t run out of my medicines on weekends or holidays. If my care team is willing to refill my opioid prescription without a visit, I must request refills only during office hours. Refills may take up to 3 business days to process. I will use one pharmacy to fill all my opioid and other controlled substance prescriptions. I will notify the clinic about any changes to my insurance or medication  availability.    7. I am responsible for my prescriptions. If the medicine/prescription is lost, stolen or destroyed, it will not be replaced. I also agree not to share controlled substance medicines with anyone.    8. I am aware I should not use any illegal or recreational drugs. I agree not to drink alcohol unless my care team says I can.       9. If I enroll in the [place] [facility] Cann[facility] program, I will tell my care team prior to my next refill.     10. I will tell my care team right away if I become pregnant, have a new medical problem treated outside of my regular clinic, or have a change in my medications.    11. I understand that this medicine can affect my thinking, judgment and reaction time. Alcohol and drugs affect the brain and body, which can affect the safety of my driving. Being under the influence of alcohol or drugs can affect my decision-making, behaviors, personal safety, and the safety of others. Driving while impaired (DWI) can occur if a person is driving, operating, or in physical control of a car, motorcycle, boat, snowmobile, ATV, motorbike, off-road vehicle, or any other motor vehicle ([place] Statute 169A.20). I understand the risk if I choose to drive or operate any vehicle or machinery.    I understand that if I do not follow any of the conditions above, my prescriptions or treatment may be stopped or changed.          Opioids  What You Need to Know    What are opioids?   Opioids are pain medicines that must be prescribed by a doctor. They are also known as narcotics.     Examples are:   1. morphine (MS Contin, Jeannie)  2. oxycodone (Oxycontin)  3. oxycodone and acetaminophen (Percocet)  4. hydrocodone and acetaminophen (Vicodin, Norco)   5. fentanyl patch (Duragesic)   6. hydromorphone (Dilaudid)   7. methadone  8. codeine (Tylenol #3)     What do opioids do well?   Opioids are best for severe short-term pain such as after a surgery or injury. They may work well for cancer pain. They may  help some people with long-lasting (chronic) pain.     What do opioids NOT do well?   Opioids never get rid of pain entirely, and they don t work well for most patients with chronic pain. Opioids don t reduce swelling, one of the causes of pain.                                    Other ways to manage chronic pain and improve function include:       Treat the health problem that may be causing pain    Anti-inflammation medicines, which reduce swelling and tenderness, such as ibuprofen (Advil, Motrin) or naproxen (Aleve)    Acetaminophen (Tylenol)    Antidepressants and anti-seizure medicines, especially for nerve pain    Topical treatments such as patches or creams    Injections or nerve blocks    Chiropractic or osteopathic treatment    Acupuncture, massage, deep breathing, meditation, visual imagery, aromatherapy    Use heat or ice at the pain site    Physical therapy     Exercise    Stop smoking    Take part in therapy       Risks and side effects     Talk to your doctor before you start or decide to keep taking opioids. Possible side effects include:      Lowering your breathing rate enough to cause death    Overdose, including death, especially if taking higher than prescribed doses    Worse depression symptoms; less pleasure in things you usually enjoy    Feeling tired or sluggish    Slower thoughts or cloudy thinking    Being more sensitive to pain over time; pain is harder to control    Trouble sleeping or restless sleep    Changes in hormone levels (for example, less testosterone)    Changes in sex drive or ability to have sex    Constipation    Unsafe driving    Itching and sweating    Dizziness    Nausea, throwing up and dry mouth    What else should I know about opioids?    Opioids may lead to dependence, tolerance, or addiction.      Dependence means that if you stop or reduce the medicine too quickly, you will have withdrawal symptoms. These include loose poop (diarrhea), jitters, flu-like symptoms,  nervousness and tremors. Dependence is not the same as addiction.                       Tolerance means needing higher doses over time to get the same effect. This may increase the chance of serious side effects.      Addiction is when people improperly use a substance that harms their body, their mind or their relations with others. Use of opiates can cause a relapse of addiction if you have a history of drug or alcohol abuse.      People who have used opioids for a long time may have a lower quality of life, worse depression, higher levels of pain and more visits to doctors.    You can overdose on opioids. Take these steps to lower your risk of overdose:    1. Recognize the signs:  Signs of overdose include decrease or loss of consciousness (blackout), slowed breathing, trouble waking up and blue lips. If someone is worried about overdose, they should call 911.    2. Talk to your doctor about Narcan (naloxone).   If you are at risk for overdose, you may be given a prescription for Narcan. This medicine very quickly reverses the effects of opioids.   If you overdose, a friend or family member can give you Narcan while waiting for the ambulance. They need to know the signs of overdose and how to give Narcan.     3. Don't use alcohol or street drugs.   Taking them with opioids can cause death.    4. Do not take any of these medicines unless your doctor says it s OK. Taking these with opioids can cause death:    Benzodiazepines, such as lorazepam (Ativan), alprazolam (Xanax) or diazepam (Valium)    Muscle relaxers, such as cyclobenzaprine (Flexeril)    Sleeping pills like zolpidem (Ambien)     Other opioids      How to keep you and other people safe while taking opioids:    1. Never share your opioids with others.  Opioid medicines are regulated by the Drug Enforcement Agency ([place]). Selling or sharing medications is a criminal act.    2. Be sure to store opioids in a secure place, locked up if possible. Young children  can easily swallow them and overdose.    3. When you are traveling with your medicines, keep them in the original bottles. If you use a pill box, be sure you also carry a copy of your medicine list from your clinic or pharmacy.    4. Safe disposal of opioids    Most pharmacies have places to get rid of medicine, called disposal kiosks. Medicine disposal options are also available in every [facility]. Search your county and  medication disposal  to find more options. You can find more details at:  [email or web address].Located within [facility].[place]./living-green/managing-unwanted-medications     I agree that my provider, clinic care team, and pharmacy may work with any city, state or federal law enforcement agency that investigates the misuse, sale, or other diversion of my controlled medicine. I will allow my provider to discuss my care with, or share a copy of, this agreement with any other treating provider, pharmacy or emergency room where I receive care.    I have read this agreement and have asked questions about anything I did not understand.    _______________________________________________________  Patient Signature - Shania Holliday _____________________                   Date     _______________________________________________________  Provider Signature - Lisa Merino NP   _____________________                   Date     _______________________________________________________  Witness Signature (required if provider not present while patient signing)   _____________________                   Date

## 2022-10-26 ENCOUNTER — ANCILLARY PROCEDURE (OUTPATIENT)
Dept: GENERAL RADIOLOGY | Facility: CLINIC | Age: 72
End: 2022-10-26
Attending: PODIATRIST
Payer: COMMERCIAL

## 2022-10-26 ENCOUNTER — OFFICE VISIT (OUTPATIENT)
Dept: PODIATRY | Facility: CLINIC | Age: 72
End: 2022-10-26
Attending: NURSE PRACTITIONER
Payer: COMMERCIAL

## 2022-10-26 VITALS — BODY MASS INDEX: 27.97 KG/M2 | WEIGHT: 152 LBS | HEIGHT: 62 IN

## 2022-10-26 DIAGNOSIS — M20.12 HALLUX VALGUS, LEFT: Primary | ICD-10-CM

## 2022-10-26 DIAGNOSIS — F11.90 CHRONIC, CONTINUOUS USE OF OPIOIDS: ICD-10-CM

## 2022-10-26 DIAGNOSIS — Z72.0 TOBACCO ABUSE: ICD-10-CM

## 2022-10-26 DIAGNOSIS — G62.9 NEUROPATHY: ICD-10-CM

## 2022-10-26 DIAGNOSIS — M21.612 BUNION, LEFT FOOT: ICD-10-CM

## 2022-10-26 PROCEDURE — 99203 OFFICE O/P NEW LOW 30 MIN: CPT | Performed by: PODIATRIST

## 2022-10-26 PROCEDURE — 73630 X-RAY EXAM OF FOOT: CPT | Mod: TC | Performed by: RADIOLOGY

## 2022-10-26 NOTE — LETTER
10/26/2022         RE: Shania Holliday  [address]   [place] [facility] [facility] [place] [id number]        Dear Colleague,    Thank you for referring your patient, Shania Holliday, to the [facility]. Please see a copy of my visit note below.    Assessment:      ICD-10-CM    1. Hallux valgus, left  M20.12 XR Foot Left G/E 3 Views     Orthotics and Prosthetics DME Orthotic; Foot Orthotics      2. Tobacco abuse  Z72.0 Orthotics and Prosthetics DME Orthotic; Foot Orthotics      3. Neuropathy  G62.9 Orthotics and Prosthetics DME Orthotic; Foot Orthotics      4. Chronic, continuous use of opioids  F11.90 Orthotics and Prosthetics DME Orthotic; Foot Orthotics             Plan:  Orders Placed This Encounter   Procedures     XR Foot Left G/E 3 Views     Orthotics and Prosthetics DME Orthotic; Foot Orthotics       Discussed the etiology and treatment of the condition with the patient.  Imaging studies reviewed and discussed with the patient.  Discussed surgical and conservative options.    Not a surgical candidate due to comorbidities      -Orthoses- SuperFeet with metatarsal pads, see AVS.  -Shoe gear changes, Toe spacers reviewed, see AVS  -Activity- toe yoga    -Surgical discussion held, specifically big toe fusion - but not by myself    Return:  No follow-ups on file.    Bee Cummings DPM                  Chief Complaint:     Patient presents with:  Left Foot - Bunion     bilateral bunion    HPI:  Shania Holliday is a 72 year old year old female who presents for evaluation of bunion.    Pain location- where big toe abuts 2nd toe, into arch  R > L but b/l    Oxycodone for several years for her back.  Does not see pain management  Neuropathy from back injury - some in her feet  Smokes 1/2 PPD      Past Medical & Surgical History:  Past Medical History:   Diagnosis Date     Backache, unspecified      Cervicalgia      Depressive disorder, not elsewhere classified      Disorder of bone and cartilage, unspecified       Esophageal reflux      Essential and other specified forms of tremor      Headache(784.0)      Hemangioma of unspecified site      Other genital herpes      Other specified idiopathic peripheral neuropathy      Pure hypercholesterolemia      Temporomandibular joint disorders, unspecified      Tobacco use disorder       Past Surgical History:   Procedure Laterality Date     COLONOSCOPY  12/15/2014     HC REMOVE TONSILS/ADENOIDS,<14 Y/O      T & A <13y.o.     SURGICAL HISTORY OF -       left knee surgery     SURGICAL HISTORY OF -       laparotomy     SURGICAL HISTORY OF -       tubal ligation     SURGICAL HISTORY OF -       sinus surgery     SURGICAL HISTORY OF -       oral surgery      Family History   Problem Relation Age of Onset     Hypertension Mother      Cerebrovascular Disease Mother          from aneurysm     Arthritis Mother      Respiratory Mother         asthma     Eye Disorder Mother      Circulatory Mother         DVT     C.A.D. Father         MI in 50's     Cerebrovascular Disease Father         aneurysm x 3     Arthritis Father      Respiratory Father         asthma     Eye Disorder Father      Diabetes Maternal Uncle         type 2     Arthritis Sister         x2     Asthma Sister      Cerebrovascular Disease Sister         stroke      Dementia Sister      Anxiety Disorder Sister         Panic     Cancer Paternal Aunt         melanoma     Cancer Other         nephew melanoma     Respiratory Sister         asthma     Arthritis Sister      Eye Disorder Other         nephew macular degeneration     Cancer Son         Advanced Melanoma     Breast Cancer No family hx of      Cancer - colorectal No family hx of         Social History:  ?  History   Smoking Status     Every Day     Packs/day: 0.50     Years: 38.00     Types: Cigarettes   Smokeless Tobacco     Never     Comment: 4-5 per day.      History   Drug Use No     Social History    Substance and Sexual Activity      Alcohol use: No         "Alcohol/week: 0.0 standard drinks      Allergies:  ?   Allergies   Allergen Reactions     Macadamia Nut Oil Shortness Of Breath and Swelling     Macadamia nuts     Neurontin [Gabapentin] Visual Disturbance     Ended up in the hospital. strabismus, required an MRI.      Aleve [Naproxen Sodium]      Aloe Vera Rash     Baclofen Other (See Comments)     Headaches     Clinoril [Sulindac]      Darvocet [Propoxyphene N-Apap]      Dustmites [Dust Mites]      Flagyl [Metronidazole] Hives     Dentist put pt on medication. Caused hives and swelling.      Fosamax      Mold      Morphine      Msg [Monosodium Glutamate]      Nsaids      Trazodone [Trazodone Derivatives] Visual Disturbance     strabismus     Vioxx [Rofecoxib]         Medications:    Current Outpatient Medications   Medication     albuterol (PROAIR HFA) 108 (90 Base) MCG/ACT inhaler     ASPERCREME 10 % EX CREA     ASPIRIN 325 MG OR TBEC     Biotin w/ Vitamins C & E (HAIR/SKIN/NAILS PO)     CALCIUM + D OR     cetirizine HCl (ZYRTEC ALLERGY) 10 MG CAPS     fluticasone (FLONASE) 50 MCG/ACT nasal spray     hydrOXYzine (ATARAX) 25 MG tablet     methocarbamol (ROBAXIN) 750 MG tablet     MULTIVITAMIN TABS   OR     [START ON 12/23/2022] oxyCODONE (ROXICODONE) 5 MG tablet     [START ON 11/23/2022] oxyCODONE (ROXICODONE) 5 MG tablet     oxyCODONE (ROXICODONE) 5 MG tablet     pantoprazole (PROTONIX) 40 MG EC tablet     PARoxetine (PAXIL) 20 MG tablet     simvastatin (ZOCOR) 20 MG tablet     No current facility-administered medications for this visit.       Physical Exam:  ?  Vitals:  Ht 1.562 m (5' 1.5\")   Wt 68.9 kg (152 lb)   LMP  (LMP Unknown)   BMI 28.26 kg/m     General:  WD/WN, in NAD.  A&O x3.  Dermatologic:    Skin is intact, open lesions absent.   Skin texture, turgor is abnormal, atrophic.  Vascular:  Pulses palpable bilateral.  Digital capillary refill time normal bilateral.  Skin temperature is warm bilateral.  Generalized edema- trace bilateral.  Focal edema- " moderate 1st MTP joint bilateral.  Neurologic:    Gross sensation abnormal, diminished b/l.  Gait and balance normal.  Musculoskeletal:  Maximal pain to palpation of sub 2nd met, medial eminence bilateral.  1st MPJ ROM track bound, Is not reducible, painful  bilateral.  Muscle strength 5/5  foot and ankle bilateral.    Stance:  RCSP Valgus bilateral  Foot type:  valgus  Clinical deformity: hallux valgus    Imaging:   x-ray independently reviewed and interpreted by myself today.  Weight-bearing views left foot dated 10/26/22, reveal moderate hallux valgus, hallux abuts 2nd digit.  Osteopenia  Pes valgus                  Again, thank you for allowing me to participate in the care of your patient.        Sincerely,        FLORIDALMA NeriM

## 2022-10-26 NOTE — PROGRESS NOTES
Assessment:      ICD-10-CM    1. Hallux valgus, left  M20.12 XR Foot Left G/E 3 Views     [facility] Orth[facility]; Foot Orthotics      2. Tobacco abuse  Z72.0 [facility]otics [facility] Prosthetics DME Orthotic; Foot Orthotics      3. Neuropathy  G62.9 [facility]otics [facility] Prosthetics DME Orthotic; Foot Orthotics      4. Chronic, continuous use of opioids  F11.90 [facility]otics [facility]st[facility] DME Orthotic; Foot Orthotics             Plan:  Orders Placed This Encounter   Procedures     XR Foot Left G/E 3 Views     [facility]otics [facility] Prosthetics DME Orthotic; Foot Orthotics       Discussed the etiology and treatment of the condition with the patient.  Imaging studies reviewed and discussed with the patient.  Discussed surgical and conservative options.    Not a surgical candidate due to comorbidities      -Orthoses- SuperFeet with metatarsal pads, see AVS.  -Shoe gear changes, Toe spacers reviewed, see AVS  -Activity- toe yoga    -Surgical discussion held, specifically big toe fusion - but not by myself    Return:  No follow-ups on file.    Bee Cummings DPM                  Chief Complaint:     Patient presents with:  Left Foot - Bunion     bilateral bunion    HPI:  Shania Holliday is a 72 year old year old female who presents for evaluation of bunion.    Pain location- where big toe abuts 2nd toe, into arch  R > L but b/l    Oxycodone for several years for her back.  Does not see pain management  Neuropathy from back injury - some in her feet  Smokes 1/2 PPD      Past Medical & Surgical History:  Past Medical History:   Diagnosis Date     Backache, unspecified      Cervicalgia      Depressive disorder, not elsewhere classified      Disorder of bone and cartilage, unspecified      Esophageal reflux      Essential and other specified forms of tremor      Headache(784.0)      Hemangioma of unspecified site      Other genital herpes      Other specified idiopathic peripheral neuropathy      Pure hypercholesterolemia       Temporomandibular joint disorders, unspecified      Tobacco use disorder       Past Surgical History:   Procedure Laterality Date     COLONOSCOPY  12/15/2014     HC REMOVE TONSILS/ADENOIDS,<12 Y/O      T & A <13y.o.     SURGICAL HISTORY OF -       left knee surgery     SURGICAL HISTORY OF -       laparotomy     SURGICAL HISTORY OF -       tubal ligation     SURGICAL HISTORY OF -       sinus surgery     SURGICAL HISTORY OF -       oral surgery      Family History   Problem Relation Age of Onset     Hypertension Mother      Cerebrovascular Disease Mother          from aneurysm     Arthritis Mother      Respiratory Mother         asthma     Eye Disorder Mother      Circulatory Mother         DVT     C.A.D. Father         MI in 50's     Cerebrovascular Disease Father         aneurysm x 3     Arthritis Father      Respiratory Father         asthma     Eye Disorder Father      Diabetes Maternal Uncle         type 2     Arthritis Sister         x2     Asthma Sister      Cerebrovascular Disease Sister         stroke      Dementia Sister      Anxiety Disorder Sister         Panic     Cancer Paternal Aunt         melanoma     Cancer Other         nephew melanoma     Respiratory Sister         asthma     Arthritis Sister      Eye Disorder Other         nephew macular degeneration     Cancer Son         Advanced Melanoma     Breast Cancer No family hx of      Cancer - colorectal No family hx of         Social History:  ?  History   Smoking Status     Every Day     Packs/day: 0.50     Years: 38.00     Types: Cigarettes   Smokeless Tobacco     Never     Comment: 4-5 per day.      History   Drug Use No     Social History    Substance and Sexual Activity      Alcohol use: No        Alcohol/week: 0.0 standard drinks      Allergies:  ?   Allergies   Allergen Reactions     Macadamia Nut Oil Shortness Of Breath and Swelling     Macadamia nuts     Neurontin [Gabapentin] Visual Disturbance     Ended up in the hospital. strabismus,  "required an MRI.      Aleve [Naproxen Sodium]      Aloe Vera Rash     Baclofen Other (See Comments)     Headaches     Clinoril [Sulindac]      Darvocet [Propoxyphene N-Apap]      Dustmites [Dust Mites]      Flagyl [Metronidazole] Hives     Dentist put pt on medication. Caused hives and swelling.      Fosamax      Mold      Morphine      Msg [Monosodium Glutamate]      Nsaids      Trazodone [Trazodone Derivatives] Visual Disturbance     strabismus     Vioxx [Rofecoxib]         Medications:    Current Outpatient Medications   Medication     albuterol (PROAIR HFA) 108 (90 Base) MCG/ACT inhaler     ASPERCREME 10 % EX CREA     ASPIRIN 325 MG OR TBEC     Biotin w/ Vitamins C & E (HAIR/SKIN/NAILS PO)     CALCIUM + D OR     cetirizine HCl (ZYRTEC ALLERGY) 10 MG CAPS     fluticasone (FLONASE) 50 MCG/ACT nasal spray     hydrOXYzine (ATARAX) 25 MG tablet     methocarbamol (ROBAXIN) 750 MG tablet     MULTIVITAMIN TABS   OR     [START ON 12/23/2022] oxyCODONE (ROXICODONE) 5 MG tablet     [START ON 11/23/2022] oxyCODONE (ROXICODONE) 5 MG tablet     oxyCODONE (ROXICODONE) 5 MG tablet     pantoprazole (PROTONIX) 40 MG EC tablet     PARoxetine (PAXIL) 20 MG tablet     simvastatin (ZOCOR) 20 MG tablet     No current facility-administered medications for this visit.       Physical Exam:  ?  Vitals:  Ht 1.562 m (5' 1.5\")   Wt 68.9 kg (152 lb)   LMP  (LMP Unknown)   BMI 28.26 kg/m     General:  WD/WN, in NAD.  A&O x3.  Dermatologic:    Skin is intact, open lesions absent.   Skin texture, turgor is abnormal, atrophic.  Vascular:  Pulses palpable bilateral.  Digital capillary refill time normal bilateral.  Skin temperature is warm bilateral.  Generalized edema- trace bilateral.  Focal edema- moderate 1st MTP joint bilateral.  Neurologic:    Gross sensation abnormal, diminished b/l.  Gait and balance normal.  Musculoskeletal:  Maximal pain to palpation of sub 2nd met, medial eminence bilateral.  1st MPJ ROM track bound, Is not " reducible, painful  bilateral.  Muscle strength 5/5  foot and ankle bilateral.    Stance:  RCSP Valgus bilateral  Foot type:  valgus  Clinical deformity: hallux valgus    Imaging:   x-ray independently reviewed and interpreted by myself today.  Weight-bearing views left foot dated 10/26/22, reveal moderate hallux valgus, hallux abuts 2nd digit.  Osteopenia  Pes valgus

## 2022-11-17 ENCOUNTER — ANCILLARY PROCEDURE (OUTPATIENT)
Dept: CT IMAGING | Facility: CLINIC | Age: 72
End: 2022-11-17
Attending: NURSE PRACTITIONER
Payer: COMMERCIAL

## 2022-11-17 DIAGNOSIS — Z87.891 PERSONAL HISTORY OF TOBACCO USE: ICD-10-CM

## 2022-11-17 PROCEDURE — 71271 CT THORAX LUNG CANCER SCR C-: CPT | Mod: TC | Performed by: RADIOLOGY

## 2022-11-21 ENCOUNTER — TELEPHONE (OUTPATIENT)
Dept: FAMILY MEDICINE | Facility: CLINIC | Age: 72
End: 2022-11-21

## 2022-11-21 NOTE — TELEPHONE ENCOUNTER
"Michelle from [facility] calling to report incidental finding from 11/17 CT Chest     \"2. Significant Incidental Finding(s):  Category S: Yes.   *  Moderate atherosclerotic vascular calcification of the coronary  arteries.  *  Ill-defined right thyroid nodule, can be further evaluated with  thyroid ultrasound if clinically warranted.\"    GINA Regan RN  [facility]    "

## 2022-12-03 DIAGNOSIS — E04.1 THYROID NODULE: Primary | ICD-10-CM

## 2022-12-06 ENCOUNTER — ANCILLARY PROCEDURE (OUTPATIENT)
Dept: ULTRASOUND IMAGING | Facility: CLINIC | Age: 72
End: 2022-12-06
Attending: NURSE PRACTITIONER
Payer: COMMERCIAL

## 2022-12-06 DIAGNOSIS — E04.1 THYROID NODULE: ICD-10-CM

## 2022-12-06 PROCEDURE — 76536 US EXAM OF HEAD AND NECK: CPT | Mod: TC | Performed by: RADIOLOGY

## 2023-01-16 ENCOUNTER — VIRTUAL VISIT (OUTPATIENT)
Dept: FAMILY MEDICINE | Facility: CLINIC | Age: 73
End: 2023-01-16
Payer: COMMERCIAL

## 2023-01-16 DIAGNOSIS — G89.4 CHRONIC PAIN SYNDROME: Primary | ICD-10-CM

## 2023-01-16 DIAGNOSIS — D18.02: ICD-10-CM

## 2023-01-16 DIAGNOSIS — J43.9 PULMONARY EMPHYSEMA, UNSPECIFIED EMPHYSEMA TYPE (H): ICD-10-CM

## 2023-01-16 DIAGNOSIS — E04.1 THYROID NODULE: ICD-10-CM

## 2023-01-16 DIAGNOSIS — F33.42 MAJOR DEPRESSIVE DISORDER, RECURRENT, IN FULL REMISSION (H): ICD-10-CM

## 2023-01-16 DIAGNOSIS — F11.90 CHRONIC, CONTINUOUS USE OF OPIOIDS: ICD-10-CM

## 2023-01-16 PROCEDURE — 99214 OFFICE O/P EST MOD 30 MIN: CPT | Mod: 95 | Performed by: NURSE PRACTITIONER

## 2023-01-16 RX ORDER — OXYCODONE HYDROCHLORIDE 5 MG/1
5-10 TABLET ORAL EVERY 4 HOURS PRN
Qty: 150 TABLET | Refills: 0 | Status: SHIPPED | OUTPATIENT
Start: 2023-01-22 | End: 2023-04-18

## 2023-01-16 RX ORDER — OXYCODONE HYDROCHLORIDE 5 MG/1
5-10 TABLET ORAL EVERY 4 HOURS PRN
Qty: 150 TABLET | Refills: 0 | Status: SHIPPED | OUTPATIENT
Start: 2023-02-21 | End: 2023-04-18

## 2023-01-16 RX ORDER — OXYCODONE HYDROCHLORIDE 5 MG/1
5-10 TABLET ORAL EVERY 4 HOURS PRN
Qty: 150 TABLET | Refills: 0 | Status: SHIPPED | OUTPATIENT
Start: 2023-03-23 | End: 2023-04-18

## 2023-01-16 ASSESSMENT — ANXIETY QUESTIONNAIRES
1. FEELING NERVOUS, ANXIOUS, OR ON EDGE: NOT AT ALL
7. FEELING AFRAID AS IF SOMETHING AWFUL MIGHT HAPPEN: NOT AT ALL
GAD7 TOTAL SCORE: 0
GAD7 TOTAL SCORE: 0
3. WORRYING TOO MUCH ABOUT DIFFERENT THINGS: NOT AT ALL
IF YOU CHECKED OFF ANY PROBLEMS ON THIS QUESTIONNAIRE, HOW DIFFICULT HAVE THESE PROBLEMS MADE IT FOR YOU TO DO YOUR WORK, TAKE CARE OF THINGS AT HOME, OR GET ALONG WITH OTHER PEOPLE: NOT DIFFICULT AT ALL
5. BEING SO RESTLESS THAT IT IS HARD TO SIT STILL: NOT AT ALL
6. BECOMING EASILY ANNOYED OR IRRITABLE: NOT AT ALL
4. TROUBLE RELAXING: NOT AT ALL
2. NOT BEING ABLE TO STOP OR CONTROL WORRYING: NOT AT ALL

## 2023-01-16 NOTE — PROGRESS NOTES
Shania is a 72 year old who is being evaluated via a billable telephone visit.      What phone number would you like to be contacted at?   Home Phone [phone]   Mobile Not on file.       How would you like to obtain your AVS? Mail a copy  Distant Location (provider location):  On-site    Assessment & Plan     (G89.4) Chronic pain syndrome  (primary encounter diagnosis)  Comment: stable  Plan: oxyCODONE (ROXICODONE) 5 MG tablet, oxyCODONE         (ROXICODONE) 5 MG tablet, oxyCODONE         (ROXICODONE) 5 MG tablet         reviewed.  Continue on current medications.  Refills given for 3 months. Follow up phone visit in 3 months.     (F11.90) Chronic, continuous use of opioids  Comment:   Plan: oxyCODONE (ROXICODONE) 5 MG tablet, oxyCODONE         (ROXICODONE) 5 MG tablet, oxyCODONE         (ROXICODONE) 5 MG tablet        See above.     (J43.9) Pulmonary emphysema, unspecified emphysema type (H)  Comment: stable  Plan: .The current medical regimen is effective;  continue present plan and medications.  Encouraged smoking cessation.     (F33.42) Major depressive disorder, recurrent, in full remission (H)  Comment: stable  Plan: The current medical regimen is effective;  continue present plan and medications.     (D18.02) Hemangioma, intracranial structures (H)  Comment:   Plan: Will have her follow up with neurology.     (E04.1) Thyroid nodule  Comment:   Plan: Will repeat a thyroid US in December.       31 minutes spent on the date of the encounter doing chart review, patient visit and documentation            No follow-ups on file.    Lisa Merino NP  [facility] [place]    Michael Jauregui is a 72 year old, presenting for the following health issues:  Musculoskeletal Problem      HPI     Chronic/Recurring Back Pain Follow Up      Where is your back pain located? (Select all that apply) low back both, middle of back both, upper back both and neck both    How would you describe your back  "pain?  cramping, dull ache, sharp, shooting and stabbing    Where does your back pain spread? the right and left buttock, the right and left  thigh, the right and left shoulder and the right and left neck    Since your last clinic visit for back pain, how has your pain changed? unchanged    Does your back pain interfere with your job? Not applicable    Since your last visit, have you tried any new treatment? No      How many servings of fruits and vegetables do you eat daily?  2-3    On average, how many sweetened beverages do you drink each day (Examples: soda, juice, sweet tea, etc.  Do NOT count diet or artificially sweetened beverages)?   3    How many days per week do you exercise enough to make your heart beat faster? 3 or less    How many minutes a day do you exercise enough to make your heart beat faster? 9 or less    How many days per week do you miss taking your medication? 0    She recently had a thyroid ultrasound done.  She denies any denies any symptoms such as fatigue; changes in weight; temperature intolerance; skin changes; constipation or loose stools or any compressive symptoms like dysphagia or shortness of breath.    She mentions having an MRI done many years ago.  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    Her COPD is stable.  She is thinking of trying to quit smoking.            Review of Systems         Objective    Vitals - Patient Reported  Weight (Patient Reported): 68 kg (150 lb)  Height (Patient Reported): 157.5 cm (5' 2\")  BMI (Based on Pt Reported Ht/Wt): 27.44      Vitals:  No vitals were obtained today due to virtual visit.    Physical Exam   healthy, alert and no distress  PSYCH: Alert and oriented times 3; coherent speech, normal   rate and volume, able to articulate logical thoughts, able   to abstract reason, no tangential thoughts, no hallucinations   or delusions  Her " affect is normal  RESP: No cough, no audible wheezing, able to talk in full sentences  Remainder of exam unable to be completed due to telephone visits                Phone call duration: 22 minutes

## 2023-04-18 ENCOUNTER — VIRTUAL VISIT (OUTPATIENT)
Dept: FAMILY MEDICINE | Facility: CLINIC | Age: 73
End: 2023-04-18
Payer: COMMERCIAL

## 2023-04-18 DIAGNOSIS — F11.90 CHRONIC, CONTINUOUS USE OF OPIOIDS: ICD-10-CM

## 2023-04-18 DIAGNOSIS — G89.4 CHRONIC PAIN SYNDROME: Primary | ICD-10-CM

## 2023-04-18 DIAGNOSIS — K21.9 GASTROESOPHAGEAL REFLUX DISEASE WITHOUT ESOPHAGITIS: ICD-10-CM

## 2023-04-18 PROCEDURE — 99213 OFFICE O/P EST LOW 20 MIN: CPT | Mod: 95 | Performed by: NURSE PRACTITIONER

## 2023-04-18 RX ORDER — METHOCARBAMOL 750 MG/1
750 TABLET, FILM COATED ORAL EVERY 6 HOURS PRN
Qty: 180 TABLET | Refills: 3 | Status: SHIPPED | OUTPATIENT
Start: 2023-04-18 | End: 2023-10-25

## 2023-04-18 RX ORDER — PANTOPRAZOLE SODIUM 40 MG/1
40 TABLET, DELAYED RELEASE ORAL DAILY
Qty: 90 TABLET | Refills: 3 | Status: SHIPPED | OUTPATIENT
Start: 2023-04-18 | End: 2023-10-25

## 2023-04-18 RX ORDER — OXYCODONE HYDROCHLORIDE 5 MG/1
5-10 TABLET ORAL EVERY 4 HOURS PRN
Qty: 150 TABLET | Refills: 0 | Status: SHIPPED | OUTPATIENT
Start: 2023-04-22 | End: 2023-07-17

## 2023-04-18 RX ORDER — OXYCODONE HYDROCHLORIDE 5 MG/1
5-10 TABLET ORAL EVERY 4 HOURS PRN
Qty: 150 TABLET | Refills: 0 | Status: SHIPPED | OUTPATIENT
Start: 2023-05-22 | End: 2023-07-17

## 2023-04-18 RX ORDER — OXYCODONE HYDROCHLORIDE 5 MG/1
5-10 TABLET ORAL EVERY 4 HOURS PRN
Qty: 150 TABLET | Refills: 0 | Status: SHIPPED | OUTPATIENT
Start: 2023-06-21 | End: 2023-07-17

## 2023-04-18 ASSESSMENT — ANXIETY QUESTIONNAIRES
5. BEING SO RESTLESS THAT IT IS HARD TO SIT STILL: NOT AT ALL
1. FEELING NERVOUS, ANXIOUS, OR ON EDGE: NOT AT ALL
IF YOU CHECKED OFF ANY PROBLEMS ON THIS QUESTIONNAIRE, HOW DIFFICULT HAVE THESE PROBLEMS MADE IT FOR YOU TO DO YOUR WORK, TAKE CARE OF THINGS AT HOME, OR GET ALONG WITH OTHER PEOPLE: NOT DIFFICULT AT ALL
GAD7 TOTAL SCORE: 0
2. NOT BEING ABLE TO STOP OR CONTROL WORRYING: NOT AT ALL
6. BECOMING EASILY ANNOYED OR IRRITABLE: NOT AT ALL
3. WORRYING TOO MUCH ABOUT DIFFERENT THINGS: NOT AT ALL
4. TROUBLE RELAXING: NOT AT ALL
7. FEELING AFRAID AS IF SOMETHING AWFUL MIGHT HAPPEN: NOT AT ALL
GAD7 TOTAL SCORE: 0

## 2023-04-18 ASSESSMENT — PATIENT HEALTH QUESTIONNAIRE - PHQ9: SUM OF ALL RESPONSES TO PHQ QUESTIONS 1-9: 0

## 2023-04-18 NOTE — PROGRESS NOTES
"Shania is a 72 year old who is being evaluated via a billable telephone visit.      What phone number would you like to be contacted at? [phone]   How would you like to obtain your AVS? Mail a copy  Distant Location (provider location):  On-site      Assessment & Plan     (G89.4) Chronic pain syndrome  Comment: stable  Plan: oxyCODONE (ROXICODONE) 5 MG tablet, oxyCODONE         (ROXICODONE) 5 MG tablet, oxyCODONE         (ROXICODONE) 5 MG tablet, methocarbamol         (ROBAXIN) 750 MG tablet        The current medical regimen is effective;  continue present plan and medications.   checked.  Refills given for 3 months.  Follow up phone visit in 3 months.     (F11.90) Chronic, continuous use of opioids  Comment:   Plan: oxyCODONE (ROXICODONE) 5 MG tablet, oxyCODONE         (ROXICODONE) 5 MG tablet, oxyCODONE         (ROXICODONE) 5 MG tablet        See above.     (K21.9) Gastroesophageal reflux disease without esophagitis  Comment: stable  Plan: pantoprazole (PROTONIX) 40 MG EC tablet        The current medical regimen is effective;  continue present plan and medications.         20 minutes spent by me on the date of the encounter doing chart review, patient visit and documentation        BMI:   Estimated body mass index is 28.26 kg/m  as calculated from the following:    Height as of 10/26/22: 1.562 m (5' 1.5\").    Weight as of 10/26/22: 68.9 kg (152 lb).           Lisa Merino NP  [facility]  Subjective   Shania is a 72 year old, presenting for the following health issues:  Follow Up        4/18/2023    10:19 AM   Additional Questions   Roomed by Mercedes POLLACK     Her chronic pain is stable on her current regimen of oxycodone, up to 5 tablets daily.    Her GERD is controlled with current medication.       Review of Systems         Objective    Vitals - Patient Reported  Systolic (Patient Reported): 121  Diastolic (Patient Reported): 77  Weight (Patient Reported): 68 kg (150 " lb)  SpO2 (Patient Reported): 98  Pulse (Patient Reported): 78  Pain Score: Severe Pain (6)  Pain Loc: Other - see comment (Spine neck to tailbone)        Physical Exam   healthy, alert and no distress  PSYCH: Alert and oriented times 3; coherent speech, normal   rate and volume, able to articulate logical thoughts, able   to abstract reason, no tangential thoughts, no hallucinations   or delusions  Her affect is normal  RESP: No cough, no audible wheezing, able to talk in full sentences  Remainder of exam unable to be completed due to telephone visits                Phone call duration: 13 minutes

## 2023-07-17 ENCOUNTER — VIRTUAL VISIT (OUTPATIENT)
Dept: FAMILY MEDICINE | Facility: CLINIC | Age: 73
End: 2023-07-17
Payer: COMMERCIAL

## 2023-07-17 DIAGNOSIS — G89.4 CHRONIC PAIN SYNDROME: ICD-10-CM

## 2023-07-17 DIAGNOSIS — F11.90 CHRONIC, CONTINUOUS USE OF OPIOIDS: ICD-10-CM

## 2023-07-17 PROCEDURE — 99441 PR PHYSICIAN TELEPHONE EVALUATION 5-10 MIN: CPT | Mod: 95 | Performed by: NURSE PRACTITIONER

## 2023-07-17 RX ORDER — OXYCODONE HYDROCHLORIDE 5 MG/1
5-10 TABLET ORAL EVERY 4 HOURS PRN
Qty: 150 TABLET | Refills: 0 | Status: SHIPPED | OUTPATIENT
Start: 2023-07-17 | End: 2023-10-25

## 2023-07-17 RX ORDER — OXYCODONE HYDROCHLORIDE 5 MG/1
5-10 TABLET ORAL EVERY 4 HOURS PRN
Qty: 150 TABLET | Refills: 0 | Status: SHIPPED | OUTPATIENT
Start: 2023-09-19 | End: 2023-10-16

## 2023-07-17 NOTE — PROGRESS NOTES
"Shania is a 72 year old who is being evaluated via a billable telephone visit.      What phone number would you like to be contacted at? [phone]  How would you like to obtain your AVS? Mail a copy  istant Location (provider location):  On-site    Assessment & Plan     (G89.4) Chronic pain syndrome  Comment: stable  Plan: oxyCODONE (ROXICODONE) 5 MG tablet, oxyCODONE         (ROXICODONE) 5 MG tablet, oxyCODONE         (ROXICODONE) 5 MG tablet         checked.  Refills given for 3 months.  Follow up in 3 months.     (F11.90) Chronic, continuous use of opioids  Comment:   Plan: oxyCODONE (ROXICODONE) 5 MG tablet, oxyCODONE         (ROXICODONE) 5 MG tablet, oxyCODONE         (ROXICODONE) 5 MG tablet                        BMI:   Estimated body mass index is 28.26 kg/m  as calculated from the following:    Height as of 10/26/22: 1.562 m (5' 1.5\").    Weight as of 10/26/22: 68.9 kg (152 lb).           Lisa Merino NP  [facility]   Shania is a 72 year old, presenting for the following health issues:  Follow Up (Follow Up)        7/17/2023    10:39 AM   Additional Questions   Roomed by Leyla POLLACK     Her chronic pain is stable on her current regimen of oxycodone, up to 5 tablets daily.              Review of Systems         Objective           Vitals:  No vitals were obtained today due to virtual visit.    Physical Exam   healthy, alert and no distress  PSYCH: Alert and oriented times 3; coherent speech, normal   rate and volume, able to articulate logical thoughts, able   to abstract reason, no tangential thoughts, no hallucinations   or delusions  Her affect is normal  RESP: No cough, no audible wheezing, able to talk in full sentences  Remainder of exam unable to be completed due to telephone visits                Phone call duration: 7 minutes    "

## 2023-09-18 ENCOUNTER — TELEPHONE (OUTPATIENT)
Dept: FAMILY MEDICINE | Facility: CLINIC | Age: 73
End: 2023-09-18
Payer: COMMERCIAL

## 2023-09-18 NOTE — TELEPHONE ENCOUNTER
Plan does not cover methocarbamol (ROBAXIN) 750 MG tablet.  Please start a new PA.    Reason for denial: Product not on formulary.    Insurance plan: [employer]  Patient ID: [id number]

## 2023-09-20 NOTE — TELEPHONE ENCOUNTER
Central Prior Authorization Team   Phone: [phone]    PA Initiation    Medication: methocarbamol (ROBAXIN) 750 MG tablet, rec 9-17-23  Insurance Company: [employer] Non-Specialty PA's - Phone [phone] Fax [phone]  Pharmacy Filling the Rx: [id number] - [place], [place] - [facility]  Filling Pharmacy Phone: [phone]  Filling Pharmacy Fax:    Start Date: 9/20/2023

## 2023-09-22 NOTE — TELEPHONE ENCOUNTER
Prior Authorization Approval    Authorization Effective Date: 8/21/2023  Authorization Expiration Date: 9/19/2024  Medication: methocarbamol (ROBAXIN) 750 MG tablet, rec 9-17-23  Approved Dose/Quantity:    Reference #:     Insurance Company: [employer] Non-Specialty PA's - Phone [phone] Fax [phone]  Expected CoPay:       CoPay Card Available:      Foundation Assistance Needed:    Which Pharmacy is filling the prescription (Not needed for infusion/clinic administered): [id number] - [place], [place] - [facility]  Pharmacy Notified: Yes

## 2023-09-26 DIAGNOSIS — F33.42 MAJOR DEPRESSIVE DISORDER, RECURRENT, IN FULL REMISSION (H): ICD-10-CM

## 2023-09-26 RX ORDER — PAROXETINE 20 MG/1
TABLET, FILM COATED ORAL
Qty: 45 TABLET | Refills: 0 | Status: SHIPPED | OUTPATIENT
Start: 2023-09-26 | End: 2023-10-25

## 2023-10-16 DIAGNOSIS — G89.4 CHRONIC PAIN SYNDROME: ICD-10-CM

## 2023-10-16 DIAGNOSIS — F11.90 CHRONIC, CONTINUOUS USE OF OPIOIDS: ICD-10-CM

## 2023-10-16 RX ORDER — OXYCODONE HYDROCHLORIDE 5 MG/1
5-10 TABLET ORAL EVERY 4 HOURS PRN
Qty: 150 TABLET | Refills: 0 | Status: SHIPPED | OUTPATIENT
Start: 2023-10-16 | End: 2023-10-25

## 2023-10-16 NOTE — TELEPHONE ENCOUNTER
Scheduled 10/25/23, but will be out prior to that visit-      Requesting call back once completed

## 2023-10-22 DIAGNOSIS — Z72.0 TOBACCO ABUSE: ICD-10-CM

## 2023-10-23 RX ORDER — ALBUTEROL SULFATE 90 UG/1
AEROSOL, METERED RESPIRATORY (INHALATION)
Qty: 8.5 G | Status: SHIPPED | OUTPATIENT
Start: 2023-10-23 | End: 2024-05-08

## 2023-10-25 ENCOUNTER — OFFICE VISIT (OUTPATIENT)
Dept: FAMILY MEDICINE | Facility: CLINIC | Age: 73
End: 2023-10-25
Payer: COMMERCIAL

## 2023-10-25 VITALS
HEIGHT: 62 IN | BODY MASS INDEX: 28.16 KG/M2 | SYSTOLIC BLOOD PRESSURE: 140 MMHG | OXYGEN SATURATION: 96 % | RESPIRATION RATE: 16 BRPM | HEART RATE: 72 BPM | WEIGHT: 153 LBS | DIASTOLIC BLOOD PRESSURE: 87 MMHG | TEMPERATURE: 97.2 F

## 2023-10-25 DIAGNOSIS — K21.9 GASTROESOPHAGEAL REFLUX DISEASE WITHOUT ESOPHAGITIS: ICD-10-CM

## 2023-10-25 DIAGNOSIS — G89.4 CHRONIC PAIN SYNDROME: ICD-10-CM

## 2023-10-25 DIAGNOSIS — Z12.31 VISIT FOR SCREENING MAMMOGRAM: ICD-10-CM

## 2023-10-25 DIAGNOSIS — F33.42 MAJOR DEPRESSIVE DISORDER, RECURRENT, IN FULL REMISSION (H): ICD-10-CM

## 2023-10-25 DIAGNOSIS — Z87.891 PERSONAL HISTORY OF TOBACCO USE: ICD-10-CM

## 2023-10-25 DIAGNOSIS — Z00.00 ENCOUNTER FOR MEDICARE ANNUAL WELLNESS EXAM: Primary | ICD-10-CM

## 2023-10-25 DIAGNOSIS — E78.5 HYPERLIPIDEMIA LDL GOAL <130: ICD-10-CM

## 2023-10-25 DIAGNOSIS — F11.90 CHRONIC, CONTINUOUS USE OF OPIOIDS: ICD-10-CM

## 2023-10-25 DIAGNOSIS — J44.1 COPD EXACERBATION (H): ICD-10-CM

## 2023-10-25 DIAGNOSIS — E04.1 THYROID NODULE: ICD-10-CM

## 2023-10-25 LAB
AMPHETAMINES UR QL SCN: NORMAL
BARBITURATES UR QL SCN: NORMAL
BENZODIAZ UR QL SCN: NORMAL
BZE UR QL SCN: NORMAL
CANNABINOIDS UR QL SCN: NORMAL
ERYTHROCYTE [DISTWIDTH] IN BLOOD BY AUTOMATED COUNT: 13.1 % (ref 10–15)
FENTANYL UR QL: NORMAL
HCT VFR BLD AUTO: 40.4 % (ref 35–47)
HGB BLD-MCNC: 13.5 G/DL (ref 11.7–15.7)
MCH RBC QN AUTO: 29.1 PG (ref 26.5–33)
MCHC RBC AUTO-ENTMCNC: 33.4 G/DL (ref 31.5–36.5)
MCV RBC AUTO: 87 FL (ref 78–100)
OPIATES UR QL SCN: NORMAL
PCP QUAL URINE (ROCHE): NORMAL
PLATELET # BLD AUTO: 463 10E3/UL (ref 150–450)
RBC # BLD AUTO: 4.64 10E6/UL (ref 3.8–5.2)
WBC # BLD AUTO: 8.6 10E3/UL (ref 4–11)

## 2023-10-25 PROCEDURE — 85027 COMPLETE CBC AUTOMATED: CPT | Performed by: NURSE PRACTITIONER

## 2023-10-25 PROCEDURE — 36415 COLL VENOUS BLD VENIPUNCTURE: CPT | Performed by: NURSE PRACTITIONER

## 2023-10-25 PROCEDURE — 99214 OFFICE O/P EST MOD 30 MIN: CPT | Mod: 25 | Performed by: NURSE PRACTITIONER

## 2023-10-25 PROCEDURE — 84443 ASSAY THYROID STIM HORMONE: CPT | Performed by: NURSE PRACTITIONER

## 2023-10-25 PROCEDURE — G0296 VISIT TO DETERM LDCT ELIG: HCPCS | Performed by: NURSE PRACTITIONER

## 2023-10-25 PROCEDURE — 80053 COMPREHEN METABOLIC PANEL: CPT | Performed by: NURSE PRACTITIONER

## 2023-10-25 PROCEDURE — G0439 PPPS, SUBSEQ VISIT: HCPCS | Performed by: NURSE PRACTITIONER

## 2023-10-25 PROCEDURE — 80061 LIPID PANEL: CPT | Performed by: NURSE PRACTITIONER

## 2023-10-25 PROCEDURE — 80307 DRUG TEST PRSMV CHEM ANLYZR: CPT | Performed by: NURSE PRACTITIONER

## 2023-10-25 RX ORDER — PANTOPRAZOLE SODIUM 40 MG/1
40 TABLET, DELAYED RELEASE ORAL DAILY
Qty: 90 TABLET | Refills: 3 | Status: SHIPPED | OUTPATIENT
Start: 2023-10-25

## 2023-10-25 RX ORDER — FLUTICASONE FUROATE, UMECLIDINIUM BROMIDE AND VILANTEROL TRIFENATATE 100; 62.5; 25 UG/1; UG/1; UG/1
1 POWDER RESPIRATORY (INHALATION) DAILY
Qty: 60 EACH | Refills: 12 | Status: SHIPPED | OUTPATIENT
Start: 2023-10-25 | End: 2024-05-08

## 2023-10-25 RX ORDER — PREDNISONE 20 MG/1
40 TABLET ORAL DAILY
Qty: 10 TABLET | Refills: 0 | Status: SHIPPED | OUTPATIENT
Start: 2023-10-25 | End: 2023-10-30

## 2023-10-25 RX ORDER — METHOCARBAMOL 750 MG/1
750 TABLET, FILM COATED ORAL EVERY 6 HOURS PRN
Qty: 180 TABLET | Refills: 3 | Status: SHIPPED | OUTPATIENT
Start: 2023-10-25 | End: 2024-05-08

## 2023-10-25 RX ORDER — PAROXETINE 20 MG/1
TABLET, FILM COATED ORAL
Qty: 45 TABLET | Refills: 3 | Status: SHIPPED | OUTPATIENT
Start: 2023-10-25

## 2023-10-25 RX ORDER — RESPIRATORY SYNCYTIAL VIRUS VACCINE 120MCG/0.5
0.5 KIT INTRAMUSCULAR ONCE
Qty: 1 EACH | Refills: 0 | Status: CANCELLED | OUTPATIENT
Start: 2023-10-25 | End: 2023-10-25

## 2023-10-25 RX ORDER — OXYCODONE HYDROCHLORIDE 5 MG/1
5-10 TABLET ORAL EVERY 4 HOURS PRN
Qty: 150 TABLET | Refills: 0 | Status: SHIPPED | OUTPATIENT
Start: 2023-11-15 | End: 2024-01-24

## 2023-10-25 RX ORDER — OXYCODONE HYDROCHLORIDE 5 MG/1
5-10 TABLET ORAL EVERY 4 HOURS PRN
Qty: 150 TABLET | Refills: 0 | Status: SHIPPED | OUTPATIENT
Start: 2024-01-14 | End: 2024-01-24

## 2023-10-25 RX ORDER — SIMVASTATIN 20 MG
TABLET ORAL
Qty: 90 TABLET | Refills: 3 | Status: SHIPPED | OUTPATIENT
Start: 2023-10-25

## 2023-10-25 RX ORDER — DOXYCYCLINE 100 MG/1
100 CAPSULE ORAL 2 TIMES DAILY
Qty: 14 CAPSULE | Refills: 0 | Status: SHIPPED | OUTPATIENT
Start: 2023-10-25 | End: 2023-11-01

## 2023-10-25 RX ORDER — OXYCODONE HYDROCHLORIDE 5 MG/1
5-10 TABLET ORAL EVERY 4 HOURS PRN
Qty: 150 TABLET | Refills: 0 | Status: SHIPPED | OUTPATIENT
Start: 2023-12-15 | End: 2024-01-24

## 2023-10-25 ASSESSMENT — PATIENT HEALTH QUESTIONNAIRE - PHQ9
SUM OF ALL RESPONSES TO PHQ QUESTIONS 1-9: 4
SUM OF ALL RESPONSES TO PHQ QUESTIONS 1-9: 4

## 2023-10-25 ASSESSMENT — ENCOUNTER SYMPTOMS
NERVOUS/ANXIOUS: 0
HEMATURIA: 0
HEMATOCHEZIA: 0
PARESTHESIAS: 0
EYE PAIN: 0
WEAKNESS: 0
PALPITATIONS: 0
JOINT SWELLING: 1
DYSURIA: 0
CONSTIPATION: 0
BREAST MASS: 0
NAUSEA: 0
DIZZINESS: 0
COUGH: 1
SHORTNESS OF BREATH: 0
SORE THROAT: 0
ABDOMINAL PAIN: 0
HEADACHES: 0
CHILLS: 0
FREQUENCY: 0
MYALGIAS: 1
DIARRHEA: 0
ARTHRALGIAS: 1
FEVER: 0
HEARTBURN: 0

## 2023-10-25 ASSESSMENT — ACTIVITIES OF DAILY LIVING (ADL): CURRENT_FUNCTION: NO ASSISTANCE NEEDED

## 2023-10-25 NOTE — LETTER
Opioid / Opioid Plus Controlled Substance Agreement    This is an agreement between you and your provider about the safe and appropriate use of controlled substance/opioids prescribed by your care team. Controlled substances are medicines that can cause physical and mental dependence (abuse).    There are strict laws about having and using these medicines. We here at [facility] are committing to working with you in your efforts to get better. To support you in this work, we ll help you schedule regular office appointments for medicine refills. If we must cancel or change your appointment for any reason, we ll make sure you have enough medicine to last until your next appointment.     As a Provider, I will:  Listen carefully to your concerns and treat you with respect.   Recommend a treatment plan that I believe is in your best interest. This plan may involve therapies other than opioid pain medication.   Talk with you often about the possible benefits, and the risk of harm of any medicine that we prescribe for you.   Provide a plan on how to taper (discontinue or go off) using this medicine if the decision is made to stop its use.    As a Patient, I understand that opioid(s):   Are a controlled substance prescribed by my care team to help me function or work and manage my condition(s).   Are strong medicines and can cause serious side effects such as:  Drowsiness, which can seriously affect my driving ability  A lower breathing rate, enough to cause death  Harm to my thinking ability   Depression   Abuse of and addiction to this medicine  Need to be taken exactly as prescribed. Combining opioids with certain medicines or chemicals (such as illegal drugs, sedatives, sleeping pills, and benzodiazepines) can be dangerous or even fatal. If I stop opioids suddenly, I may have severe withdrawal symptoms.  Do not work for all types of pain nor for all patients. If they re not helpful, I may be asked to stop  them.        The risks, benefits and side effects of these medicine(s) were explained to me. I agree that:  I will take part in other treatments as advised by my care team. This may be psychiatry or counseling, physical therapy, behavioral therapy, group treatment or a referral to a specialist.     I will keep all my appointments. I understand that this is part of the monitoring of opioids. My care team may require an office visit for EVERY opioid/controlled substance refill. If I miss appointments or don t follow instructions, my care team may stop my medicine.    I will take my medicines as prescribed. I will not change the dose or schedule unless my care team tells me to. There will be no refills if I run out early.     I may be asked to come to the clinic and complete a urine drug test or complete a pill count at any time. If I don t give a urine sample or participate in a pill count, the care team may stop my medicine.    I will only receive prescriptions from this clinic for chronic pain. If I am treated by another provider for acute pain issues, I will tell them that I am taking opioid pain medication for chronic pain and that I have a treatment agreement with this provider. I will inform my [facility] care team within one business day if I am given a prescription for any pain medication by another healthcare provider. My [facility] care team can contact other providers and pharmacists about my use of any medicines.    It is up to me to make sure that I don t run out of my medicines on weekends or holidays. If my care team is willing to refill my opioid prescription without a visit, I must request refills only during office hours. Refills may take up to 3 business days to process. I will use one pharmacy to fill all my opioid and other controlled substance prescriptions. I will notify the clinic about any changes to my insurance or medication availability.    I am responsible for my  prescriptions. If the medicine/prescription is lost, stolen or destroyed, it will not be replaced. I also agree not to share controlled substance medicines with anyone.    I am aware I should not use any illegal or recreational drugs. I agree not to drink alcohol unless my care team says I can.       If I enroll in the [place] [facility] program, I will tell my care team prior to my next refill.     I will tell my care team right away if I become pregnant, have a new medical problem treated outside of my regular clinic, or have a change in my medications.    I understand that this medicine can affect my thinking, judgment and reaction time. Alcohol and drugs affect the brain and body, which can affect the safety of my driving. Being under the influence of alcohol or drugs can affect my decision-making, behaviors, personal safety, and the safety of others. Driving while impaired (DWI) can occur if a person is driving, operating, or in physical control of a car, motorcycle, boat, snowmobile, ATV, motorbike, off-road vehicle, or any other motor vehicle ([place] Statute 169A.20). I understand the risk if I choose to drive or operate any vehicle or machinery.    I understand that if I do not follow any of the conditions above, my prescriptions or treatment may be stopped or changed.          Opioids  What You Need to Know    What are opioids?   Opioids are pain medicines that must be prescribed by a doctor. They are also known as narcotics.     Examples are:   morphine (MS Contin, Jeannie)  oxycodone (Oxycontin)  oxycodone and acetaminophen (Percocet)  hydrocodone and acetaminophen (Vicodin, Norco)   fentanyl patch (Duragesic)   hydromorphone (Dilaudid)   methadone  codeine (Tylenol #3)     What do opioids do well?   Opioids are best for severe short-term pain such as after a surgery or injury. They may work well for cancer pain. They may help some people with long-lasting (chronic) pain.     What do opioids NOT do  well?   Opioids never get rid of pain entirely, and they don t work well for most patients with chronic pain. Opioids don t reduce swelling, one of the causes of pain.                                    Other ways to manage chronic pain and improve function include:     Treat the health problem that may be causing pain  Anti-inflammation medicines, which reduce swelling and tenderness, such as ibuprofen (Advil, Motrin) or naproxen (Aleve)  Acetaminophen (Tylenol)  Antidepressants and anti-seizure medicines, especially for nerve pain  Topical treatments such as patches or creams  Injections or nerve blocks  Chiropractic or osteopathic treatment  Acupuncture, massage, deep breathing, meditation, visual imagery, aromatherapy  Use heat or ice at the pain site  Physical therapy   Exercise  Stop smoking  Take part in therapy       Risks and side effects     Talk to your doctor before you start or decide to keep taking opioids. Possible side effects include:    Lowering your breathing rate enough to cause death  Overdose, including death, especially if taking higher than prescribed doses  Worse depression symptoms; less pleasure in things you usually enjoy  Feeling tired or sluggish  Slower thoughts or cloudy thinking  Being more sensitive to pain over time; pain is harder to control  Trouble sleeping or restless sleep  Changes in hormone levels (for example, less testosterone)  Changes in sex drive or ability to have sex  Constipation  Unsafe driving  Itching and sweating  Dizziness  Nausea, throwing up and dry mouth    What else should I know about opioids?    Opioids may lead to dependence, tolerance, or addiction.    Dependence means that if you stop or reduce the medicine too quickly, you will have withdrawal symptoms. These include loose poop (diarrhea), jitters, flu-like symptoms, nervousness and tremors. Dependence is not the same as addiction.                     Tolerance means needing higher doses over time to  get the same effect. This may increase the chance of serious side effects.    Addiction is when people improperly use a substance that harms their body, their mind or their relations with others. Use of opiates can cause a relapse of addiction if you have a history of drug or alcohol abuse.    People who have used opioids for a long time may have a lower quality of life, worse depression, higher levels of pain and more visits to doctors.    You can overdose on opioids. Take these steps to lower your risk of overdose:    Recognize the signs:  Signs of overdose include decrease or loss of consciousness (blackout), slowed breathing, trouble waking up and blue lips. If someone is worried about overdose, they should call 911.    Talk to your doctor about Narcan (naloxone).   If you are at risk for overdose, you may be given a prescription for Narcan. This medicine very quickly reverses the effects of opioids.   If you overdose, a friend or family member can give you Narcan while waiting for the ambulance. They need to know the signs of overdose and how to give Narcan.     Don't use alcohol or street drugs.   Taking them with opioids can cause death.    Do not take any of these medicines unless your doctor says it s OK. Taking these with opioids can cause death:  Benzodiazepines, such as lorazepam (Ativan), alprazolam (Xanax) or diazepam (Valium)  Muscle relaxers, such as cyclobenzaprine (Flexeril)  Sleeping pills like zolpidem (Ambien)   Other opioids      How to keep you and other people safe while taking opioids:    Never share your opioids with others.  Opioid medicines are regulated by the Drug Enforcement Agency ([employer]). Selling or sharing medications is a criminal act.    2. Be sure to store opioids in a secure place, locked up if possible. Young children can easily swallow them and overdose.    3. When you are traveling with your medicines, keep them in the original bottles. If you use a pill box, be sure you also  carry a copy of your medicine list from your clinic or pharmacy.    4. Safe disposal of opioids    Most pharmacies have places to get rid of medicine, called disposal kiosks. Medicine disposal options are also available in every [facility]. Search your county and  medication disposal  to find more options. You can find more details at:  [email or web address]-[email or web address]-medications     I agree that my provider, clinic care team, and pharmacy may work with any city, state or federal law enforcement agency that investigates the misuse, sale, or other diversion of my controlled medicine. I will allow my provider to discuss my care with, or share a copy of, this agreement with any other treating provider, pharmacy or emergency room where I receive care.    I have read this agreement and have asked questions about anything I did not understand.    _______________________________________________________  Patient Signature - Shania Holliday _____________________                   Date     _______________________________________________________  Provider Signature - Lisa Merino NP   _____________________                   Date     _______________________________________________________  Witness Signature (required if provider not present while patient signing)   _____________________                   Date

## 2023-10-25 NOTE — LETTER
November 1, 2023      Shania Holliday  [address] [place] [address] [facility] [id number]        Dear ,    We are writing to inform you of your test results.    Labs are normal or no significant abnormalities.     Resulted Orders   TSH with free T4 reflex   Result Value Ref Range    TSH 0.81 0.30 - 4.20 uIU/mL   Comprehensive metabolic panel (BMP + Alb, Alk Phos, ALT, AST, Total. Bili, TP)   Result Value Ref Range    Sodium 131 (L) 135 - 145 mmol/L      Comment:      Reference intervals for this test were updated on 09/26/2023 to more accurately reflect our healthy population. There may be differences in the flagging of prior results with similar values performed with this method. Interpretation of those prior results can be made in the context of the updated reference intervals.     Potassium 4.4 3.4 - 5.3 mmol/L    Carbon Dioxide (CO2) 23 22 - 29 mmol/L    Anion Gap 12 7 - 15 mmol/L    Urea Nitrogen 4.5 (L) 8.0 - 23.0 mg/dL    Creatinine 0.75 0.51 - 0.95 mg/dL    GFR Estimate 84 >60 mL/min/1.73m2    Calcium 9.4 8.8 - 10.2 mg/dL    Chloride 96 (L) 98 - 107 mmol/L    Glucose 104 (H) 70 - 99 mg/dL    Alkaline Phosphatase 96 35 - 104 U/L    AST 23 0 - 45 U/L      Comment:      Reference intervals for this test were updated on 6/12/2023 to more accurately reflect our healthy population. There may be differences in the flagging of prior results with similar values performed with this method. Interpretation of those prior results can be made in the context of the updated reference intervals.    ALT 20 0 - 50 U/L      Comment:      Reference intervals for this test were updated on 6/12/2023 to more accurately reflect our healthy population. There may be differences in the flagging of prior results with similar values performed with this method. Interpretation of those prior results can be made in the context of the updated reference intervals.      Protein Total 7.3 6.4 - 8.3 g/dL    Albumin 4.6 3.5 - 5.2 g/dL    Bilirubin  Total 0.3 <=1.2 mg/dL   CBC with platelets   Result Value Ref Range    WBC Count 8.6 4.0 - 11.0 10e3/uL    RBC Count 4.64 3.80 - 5.20 10e6/uL    Hemoglobin 13.5 11.7 - 15.7 g/dL    Hematocrit 40.4 35.0 - 47.0 %    MCV 87 78 - 100 fL    MCH 29.1 26.5 - 33.0 pg    MCHC 33.4 31.5 - 36.5 g/dL    RDW 13.1 10.0 - 15.0 %    Platelet Count 463 (H) 150 - 450 10e3/uL   Lipid panel reflex to direct LDL Non-fasting   Result Value Ref Range    Cholesterol 195 <200 mg/dL    Triglycerides 83 <150 mg/dL    Direct Measure HDL 75 >=50 mg/dL    LDL Cholesterol Calculated 103 (H) <=100 mg/dL    Non HDL Cholesterol 120 <130 mg/dL    Narrative    Cholesterol  Desirable:  <200 mg/dL    Triglycerides  Normal:  Less than 150 mg/dL  Borderline High:  150-199 mg/dL  High:  200-499 mg/dL  Very High:  Greater than or equal to 500 mg/dL    Direct Measure HDL  Female:  Greater than or equal to 50 mg/dL   Male:  Greater than or equal to 40 mg/dL    LDL Cholesterol  Desirable:  <100mg/dL  Above Desirable:  100-129 mg/dL   Borderline High:  130-159 mg/dL   High:  160-189 mg/dL   Very High:  >= 190 mg/dL    Non HDL Cholesterol  Desirable:  130 mg/dL  Above Desirable:  130-159 mg/dL  Borderline High:  160-189 mg/dL  High:  190-219 mg/dL  Very High:  Greater than or equal to 220 mg/dL   Urine Drug Screen Panel   Result Value Ref Range    Amphetamines Urine Screen Negative Screen Negative      Comment:      Cutoff for a negative amphetamine is less than 500 ng/mL.    Barbituates Urine Screen Negative Screen Negative      Comment:      Cutoff for a negative barbiturate is less than 200 ng/mL.    Benzodiazepine Urine Screen Negative Screen Negative      Comment:      Cutoff for a negative benzodiazepine is less than 100 ng/mL.    Cannabinoids Urine Screen Negative Screen Negative      Comment:      Cutoff for a negative cannabinoid is less than 50 ng/mL.    Cocaine Urine Screen Negative Screen Negative      Comment:      Cutoff for a negative cocaine is  less than 300 ng/mL.    Fentanyl Qual Urine Screen Negative Screen Negative      Comment:      Cutoff for negative fentanyl is less than 5 ng/mL.    Opiates Urine Screen Negative Screen Negative      Comment:      Cutoff for a negative opiate is less than 300 ng/mL.    PCP Urine Screen Negative Screen Negative      Comment:      Cutoff for a negative PCP is less than 25 ng/mL.       If you have any questions or concerns, please call the clinic at the number listed above.       Sincerely,      Lisa Merino NP / [facility]

## 2023-10-25 NOTE — PATIENT INSTRUCTIONS
Patient Education   Personalized Prevention Plan  You are due for the preventive services outlined below.  Your care team is available to assist you in scheduling these services.  If you have already completed any of these items, please share that information with your care team to update in your medical record.  Health Maintenance Due   Topic Date Due     Zoster (Shingles) Vaccine (1 of 2) Never done     RSV VACCINE 60+ (1 - 1-dose 60+ series) Never done     Diptheria Tetanus Pertussis (DTAP/TDAP/TD) Vaccine (2 - Td or Tdap) 07/30/2023     Flu Vaccine (1) 09/01/2023     COVID-19 Vaccine (4 - 2023-24 season) 09/01/2023     URINE DRUG SCREEN  10/11/2023     CONTROLLED SUBSTANCE AGREEMENT FOR CHRONIC PAIN MANAGEMENT  10/11/2023     Annual Wellness Visit  10/11/2023     LUNG CANCER SCREENING  11/17/2023     Mammogram  11/22/2023        Lung Cancer Screening   Frequently Asked Questions  If you are at high-risk for lung cancer, getting screened with low-dose computed tomography (LDCT) every year can help save your life. This handout offers answers to some of the most common questions about lung cancer screening. If you have other questions, please call [phone] ([phone]).     What is it?  Lung cancer screening uses special X-ray technology to create an image of your lung tissue. The exam is quick and easy and takes less than 10 seconds. We don t give you any medicine or use any needles. You can eat before and after the exam. You don t need to change your clothes as long as the clothing on your chest doesn t contain metal. But, you do need to be able to hold your breath for at least 6 seconds during the exam.    What is the goal of lung cancer screening?  The goal of lung cancer screening is to save lives. Many times, lung cancer is not found until a person starts having physical symptoms. Lung cancer screening can help detect lung cancer in the earliest stages when it may be easier to treat.    Who  should be screened for lung cancer?  We suggest lung cancer screening for anyone who is at high-risk for lung cancer. You are in the high-risk group if you:      are between the ages of 55 and 79, and    have smoked at least 1 pack of cigarettes a day for 20 or more years, and    still smoke or have quit within the past 15 years.    However, if you have a new cough or shortness of breath, you should talk to your doctor before being screened.    Why does it matter if I have symptoms?  Certain symptoms can be a sign that you have a condition in your lungs that should be checked and treated by your doctor. These symptoms include fever, chest pain, a new or changing cough, shortness of breath that you have never felt before, coughing up blood or unexplained weight loss. Having any of these symptoms can greatly affect the results of lung cancer screening.       Should all smokers get an LDCT lung cancer screening exam?  It depends. Lung cancer screening is for a very specific group of men and women who have a history of heavy smoking over a long period of time (see  Who should be screened for lung cancer  above).  I am in the high-risk group, but have been diagnosed with cancer in the past. Is LDCT lung cancer screening right for me?  In some cases, you should not have LDCT lung screening, such as when your doctor is already following your cancer with CT scan studies. Your doctor will help you decide if LDCT lung screening is right for you.  Do I need to have a screening exam every year?  Yes. If you are in the high-risk group described earlier, you should get an LDCT lung cancer screening exam every year until you are 79, or are no longer willing or able to undergo screening and possible procedures to diagnose and treat lung cancer.  How effective is LDCT at preventing death from lung cancer?  Studies have shown that LDCT lung cancer screening can lower the risk of death from lung cancer by 20 percent in people who are  at high-risk.  What are the risks?  There are some risks and limitations of LDCT lung cancer screening. We want to make sure you understand the risks and benefits, so please let us know if you have any questions. Your doctor may want to talk with you more about these risks.    Radiation exposure: As with any exam that uses radiation, there is a very small increased risk of cancer. The amount of radiation in LDCT is small--about the same amount a person would get from a mammogram. Your doctor orders the exam when he or she feels the potential benefits outweigh the risks.    False negatives: No test is perfect, including LDCT. It is possible that you may have a medical condition, including lung cancer, that is not found during your exam. This is called a false negative result.    False positives and more testing: LDCT very often finds something in the lung that could be cancer, but in fact is not. This is called a false positive result. False positive tests often cause anxiety. To make sure these findings are not cancer, you may need to have more tests. These tests will be done only if you give us permission. Sometimes patients need a treatment that can have side effects, such as a biopsy. For more information on false positives, see  What can I expect from the results?     Findings not related to lung cancer: Your LDCT exam also takes pictures of areas of your body next to your lungs. In a very small number of cases, the CT scan will show an abnormal finding in one of these areas, such as your kidneys, adrenal glands, liver or thyroid. This finding may not be serious, but you may need more tests. Your doctor can help you decide what other tests you may need, if any.  What can I expect from the results?  About 1 out of 4 LDCT exams will find something that may need more tests. Most of the time, these findings are lung nodules. Lung nodules are very small collections of tissue in the lung. These nodules are very  common, and the vast majority--more than 97 percent--are not cancer (benign). Most are normal lymph nodes or small areas of scarring from past infections.  But, if a small lung nodule is found to be cancer, the cancer can be cured more than 90 percent of the time. To know if the nodule is cancer, we may need to get more images before your next yearly screening exam. If the nodule has suspicious features (for example, it is large, has an odd shape or grows over time), we will refer you to a specialist for further testing.  Will my doctor also get the results?  Yes. Your doctor will get a copy of your results.  Is it okay to keep smoking now that there s a cancer screening exam?  No. Tobacco is one of the strongest cancer-causing agents. It causes not only lung cancer, but other cancers and cardiovascular (heart) diseases as well. The damage caused by smoking builds over time. This means that the longer you smoke, the higher your risk of disease. While it is never too late to quit, the sooner you quit, the better.  Where can I find help to quit smoking?  The best way to prevent lung cancer is to stop smoking. If you have already quit smoking, congratulations and keep it up! For help on quitting smoking, please call [employer] at 0-358-QUITNOW (1-540.218.6626) or the [employer] at 1-976.591.7823 to find local resources near you.  One-on-one health coaching:  If you d prefer to work individually with a health care provider on tobacco cessation, we offer:      Medication Therapy Management:  Our specially trained pharmacists work closely with you and your doctor to help you quit smoking.  Call [phone] or [phone] (toll free).

## 2023-10-25 NOTE — PROGRESS NOTES
"SUBJECTIVE:   Shania is a 73 year old who presents for Preventive Visit.      10/25/2023     2:37 PM   Additional Questions   Roomed by Kandi   Accompanied by Self       Are you in the first 12 months of your Medicare coverage?  No    Healthy Habits:     In general, how would you rate your overall health?  Good    Frequency of exercise:  4-5 days/week    Duration of exercise:  15-30 minutes    Do you usually eat at least 4 servings of fruit and vegetables a day, include whole grains    & fiber and avoid regularly eating high fat or \"junk\" foods?  Yes    Taking medications regularly:  Yes    Medication side effects:  None    Ability to successfully perform activities of daily living:  No assistance needed    Home Safety:  No safety concerns identified    Hearing Impairment:  No hearing concerns    In the past 6 months, have you been bothered by leaking of urine? Yes    In general, how would you rate your overall mental or emotional health?  Good    Additional concerns today:  No    She has had a cough for several weeks.  It started with a fever and other family members had similar symptoms.  She has had some shortness of breath and wheezing and has needed to use her inhaler more frequently.   The cough is productive.  She does smoke and has COPD.    Her mood is stable.  She is doing well on her current dose of Paxil and denies any side effects.    Her chronic pain is stable and she is doing well on her current medication regimen.      Today's PHQ-9 Score:       10/25/2023     2:19 PM   PHQ-9 SCORE   PHQ-9 Total Score MyChart 4 (Minimal depression)   PHQ-9 Total Score 4           Have you ever done Advance Care Planning? (For example, a Health Directive, POLST, or a discussion with a medical provider or your loved ones about your wishes): No, advance care planning information given to patient to review.  Patient plans to discuss their wishes with loved ones or provider.         Fall risk  Fallen 2 or more times in the " past year?: No  Any fall with injury in the past year?: No    Cognitive Screening   1) Repeat 3 items (Leader, Season, Table)    2) Clock draw: NORMAL  3) 3 item recall: Recalls 1 object   Results: ABNORMAL clock, 1-2 items recalled: PROBABLE COGNITIVE IMPAIRMENT, **INFORM PROVIDER**    Mini-CogTM Copyright DIM[employer]. Licensed by the author for use in [facility]; reprinted with permission (maxine@[facility]). All rights reserved.      Do you have sleep apnea, excessive snoring or daytime drowsiness? : yes    Reviewed and updated as needed this visit by clinical staff   Tobacco  Allergies  Meds              Reviewed and updated as needed this visit by Provider                 Social History     Tobacco Use    Smoking status: Every Day     Packs/day: 0.50     Years: 38.00     Additional pack years: 0.00     Total pack years: 19.00     Types: Cigarettes    Smokeless tobacco: Never    Tobacco comments:     4-5 per day.    Substance Use Topics    Alcohol use: No     Alcohol/week: 0.0 standard drinks of alcohol             10/25/2023     2:25 PM   Alcohol Use   Prescreen: >3 drinks/day or >7 drinks/week? Not Applicable     Do you have a current opioid prescription? No  Do you use any other controlled substances or medications that are not prescribed by a provider? None              Current providers sharing in care for this patient include:   Patient Care Team:  Lisa Merino NP as PCP - General  Lisa Merino NP as Assigned PCP  Bee Cummings DPM as Assigned Surgical Provider  Lisa Merino NP as Assigned Pain Medication Provider    The following health maintenance items are reviewed in Epic and correct as of today:  Health Maintenance   Topic Date Due    ZOSTER IMMUNIZATION (1 of 2) Never done    RSV VACCINE 60+ (1 - 1-dose 60+ series) Never done    DTAP/TDAP/TD IMMUNIZATION (2 - Td or Tdap) 07/30/2023    INFLUENZA VACCINE (1) 09/01/2023    COVID-19 Vaccine (4 - 2023-24 season)  09/01/2023    URINE DRUG SCREEN  10/11/2023    ANNUAL REVIEW OF HM ORDERS  10/11/2023    CONTROLLED SUBSTANCE AGREEMENT FOR CHRONIC PAIN MANAGEMENT  10/11/2023    NICOTINE/TOBACCO CESSATION COUNSELING Q 1 YR  10/11/2023    MEDICARE ANNUAL WELLNESS VISIT  10/11/2023    LUNG CANCER SCREENING  11/17/2023    MAMMO SCREENING  11/22/2023    NASRA ASSESSMENT  04/18/2024    FALL RISK ASSESSMENT  10/25/2024    PHQ-9  10/25/2024    COLORECTAL CANCER SCREENING  12/15/2024    LIPID  10/11/2027    ADVANCE CARE PLANNING  10/11/2027    DEXA  09/29/2029    SPIROMETRY  Completed    HEPATITIS C SCREENING  Completed    DEPRESSION ACTION PLAN  Completed    Pneumococcal Vaccine: 65+ Years  Completed    COPD ACTION PLAN  Addressed    IPV IMMUNIZATION  Aged Out    HPV IMMUNIZATION  Aged Out    MENINGITIS IMMUNIZATION  Aged Out               Review of Systems   Constitutional:  Negative for chills and fever.   HENT:  Positive for congestion. Negative for ear pain, hearing loss and sore throat.    Eyes:  Negative for pain and visual disturbance.   Respiratory:  Positive for cough. Negative for shortness of breath.    Cardiovascular:  Positive for peripheral edema. Negative for chest pain and palpitations.   Gastrointestinal:  Negative for abdominal pain, constipation, diarrhea, heartburn, hematochezia and nausea.   Breasts:  Negative for tenderness, breast mass and discharge.   Genitourinary:  Negative for dysuria, frequency, genital sores, hematuria, pelvic pain, urgency, vaginal bleeding and vaginal discharge.   Musculoskeletal:  Positive for arthralgias, joint swelling and myalgias.   Skin:  Positive for rash.   Neurological:  Negative for dizziness, weakness, headaches and paresthesias.   Psychiatric/Behavioral:  Negative for mood changes. The patient is not nervous/anxious.          OBJECTIVE:   BP (!) 147/94 (BP Location: Right arm, Patient Position: Sitting, Cuff Size: Adult Regular)   Pulse 72   Temp 97.2  F (36.2  C) (Temporal)    "Resp 16   Ht 1.57 m (5' 1.81\")   Wt 69.4 kg (153 lb)   LMP  (LMP Unknown)   SpO2 96%   BMI 28.16 kg/m   Estimated body mass index is 28.16 kg/m  as calculated from the following:    Height as of this encounter: 1.57 m (5' 1.81\").    Weight as of this encounter: 69.4 kg (153 lb).  Physical Exam  GENERAL: healthy, alert and no distress  HENT: ear canals and TM's normal, nose and mouth without ulcers or lesions  NECK: no adenopathy, no asymmetry, masses, or scars and thyroid normal to palpation  RESP: expiratory wheezes bilateral  CV: regular rate and rhythm, normal S1 S2, no S3 or S4, no murmur, click or rub, no peripheral edema and peripheral pulses strong  SKIN: no suspicious lesions or rashes  NEURO: Normal strength and tone, mentation intact and speech normal  PSYCH: mentation appears normal, affect normal/bright        ASSESSMENT / PLAN:   (Z00.00) Encounter for Medicare annual wellness exam  (primary encounter diagnosis)  Comment:   Plan:     (Z12.31) Visit for screening mammogram  Comment:   Plan: MA SCREENING DIGITAL BILAT - Future  (s+30)            (E04.1) Thyroid nodule  Comment:   Plan: US Thyroid, TSH with free T4 reflex        Will get a repeat ultrasound.    (J44.1) COPD exacerbation (H)  Comment:   Plan: Fluticasone-Umeclidin-Vilant (TRELEGY ELLIPTA)         100-62.5-25 MCG/ACT oral inhaler, predniSONE         (DELTASONE) 20 MG tablet, doxycycline hyclate         (VIBRAMYCIN) 100 MG capsule        Discussed the use and indication of this medication as well as potential side effects.  Encouraged smoking cessation.  Follow up if symptoms are not improving in one week or sooner if symptoms worsen.     (G89.4) Chronic pain syndrome  Comment: stable  Plan: Urine Drug Screen, oxyCODONE (ROXICODONE) 5 MG         tablet, oxyCODONE (ROXICODONE) 5 MG tablet,         oxyCODONE (ROXICODONE) 5 MG tablet,         methocarbamol (ROBAXIN) 750 MG tablet        The current medical regimen is effective;  continue " "present plan and medications.  Refills given for 3 months.   checked.  New CSA reviewed and signed and will check UDS.     (F11.90) Chronic, continuous use of opioids  Comment:   Plan: oxyCODONE (ROXICODONE) 5 MG tablet, oxyCODONE         (ROXICODONE) 5 MG tablet, oxyCODONE         (ROXICODONE) 5 MG tablet        See above.    (K21.9) Gastroesophageal reflux disease without esophagitis  Comment: stable  Plan: pantoprazole (PROTONIX) 40 MG EC tablet        The current medical regimen is effective;  continue present plan and medications.     (F33.42) Major depressive disorder, recurrent, in full remission (H24)  Comment: stable  Plan: PARoxetine (PAXIL) 20 MG tablet        The current medical regimen is effective;  continue present plan and medications.     (E78.5) Hyperlipidemia LDL goal <130  Comment: stable  Plan: Comprehensive metabolic panel (BMP + Alb, Alk         Phos, ALT, AST, Total. Bili, TP), CBC with         platelets, simvastatin (ZOCOR) 20 MG tablet,         Lipid panel reflex to direct LDL Non-fasting        The current medical regimen is effective;  continue present plan and medications.     (Z87.891) Personal history of tobacco use  Comment:   Plan: Prof fee: Shared Decision Making for Lung         Cancer Screening, CT Chest Lung Cancer Scrn Low        Dose wo                  COUNSELING:  Reviewed preventive health counseling, as reflected in patient instructions      BMI:   Estimated body mass index is 28.16 kg/m  as calculated from the following:    Height as of this encounter: 1.57 m (5' 1.81\").    Weight as of this encounter: 69.4 kg (153 lb).         She reports that she has been smoking cigarettes. She has a 19.00 pack-year smoking history. She has never used smokeless tobacco.  Nicotine/Tobacco Cessation Plan:   Information offered: Patient not interested at this time      Appropriate preventive services were discussed with this patient, including applicable screening as appropriate for " fall prevention, nutrition, physical activity, Tobacco-use cessation, weight loss and cognition.  Checklist reviewing preventive services available has been given to the patient.    Reviewed patients plan of care and provided an AVS. The Basic Care Plan (routine screening as documented in Health Maintenance) for Shania meets the Care Plan requirement. This Care Plan has been established and reviewed with the Patient.          Lisa Merino NP  [facility]    Identified Health Risks:    Lung Cancer Screening Shared Decision Making Visit     Shania Holliday, a 73 year old female, is eligible for lung cancer screening    History   Smoking Status    Every Day    Packs/day: 0.50    Years: 38.00    Types: Cigarettes   Smokeless Tobacco    Never     Comment: 4-5 per day.        I have discussed with patient the risks and benefits of screening for lung cancer with low-dose CT.     The risks include:    radiation exposure: one low dose chest CT has as much ionizing radiation as about 15 chest x-rays, or 6 months of background radiation living in [place]      false positives: most findings/nodules are NOT cancer, but some might still require additional diagnostic evaluation, including biopsy    over-diagnosis: some slow growing cancers that might never have been clinically significant will be detected and treated unnecessarily     The benefit of early detection of lung cancer is contingent upon adherence to annual screening or more frequent follow up if indicated.     Furthermore, to benefit from screening, Shania must be willing and able to undergo diagnostic procedures, if indicated. Although no specific guide is available for determining severity of comorbidities, it is reasonable to withhold screening in patients who have greater mortality risk from other diseases.     We did discuss that the best way to prevent lung cancer is to not smoke.    Some patients may value a numeric estimation of  lung cancer risk when evaluating if lung cancer screening is right for them, here is one calculator:    ShouldIScreen

## 2023-10-26 LAB
ALBUMIN SERPL BCG-MCNC: 4.6 G/DL (ref 3.5–5.2)
ALP SERPL-CCNC: 96 U/L (ref 35–104)
ALT SERPL W P-5'-P-CCNC: 20 U/L (ref 0–50)
ANION GAP SERPL CALCULATED.3IONS-SCNC: 12 MMOL/L (ref 7–15)
AST SERPL W P-5'-P-CCNC: 23 U/L (ref 0–45)
BILIRUB SERPL-MCNC: 0.3 MG/DL
BUN SERPL-MCNC: 4.5 MG/DL (ref 8–23)
CALCIUM SERPL-MCNC: 9.4 MG/DL (ref 8.8–10.2)
CHLORIDE SERPL-SCNC: 96 MMOL/L (ref 98–107)
CHOLEST SERPL-MCNC: 195 MG/DL
CREAT SERPL-MCNC: 0.75 MG/DL (ref 0.51–0.95)
DEPRECATED HCO3 PLAS-SCNC: 23 MMOL/L (ref 22–29)
EGFRCR SERPLBLD CKD-EPI 2021: 84 ML/MIN/1.73M2
GLUCOSE SERPL-MCNC: 104 MG/DL (ref 70–99)
HDLC SERPL-MCNC: 75 MG/DL
LDLC SERPL CALC-MCNC: 103 MG/DL
NONHDLC SERPL-MCNC: 120 MG/DL
POTASSIUM SERPL-SCNC: 4.4 MMOL/L (ref 3.4–5.3)
PROT SERPL-MCNC: 7.3 G/DL (ref 6.4–8.3)
SODIUM SERPL-SCNC: 131 MMOL/L (ref 135–145)
TRIGL SERPL-MCNC: 83 MG/DL
TSH SERPL DL<=0.005 MIU/L-ACNC: 0.81 UIU/ML (ref 0.3–4.2)

## 2023-12-27 DIAGNOSIS — F41.9 ANXIETY: ICD-10-CM

## 2023-12-27 RX ORDER — HYDROXYZINE HYDROCHLORIDE 25 MG/1
TABLET, FILM COATED ORAL
Qty: 180 TABLET | Refills: 0 | Status: SHIPPED | OUTPATIENT
Start: 2023-12-27 | End: 2024-05-01

## 2024-01-24 ENCOUNTER — VIRTUAL VISIT (OUTPATIENT)
Dept: FAMILY MEDICINE | Facility: CLINIC | Age: 74
End: 2024-01-24
Payer: COMMERCIAL

## 2024-01-24 DIAGNOSIS — G89.4 CHRONIC PAIN SYNDROME: ICD-10-CM

## 2024-01-24 DIAGNOSIS — F33.42 RECURRENT MAJOR DEPRESSIVE DISORDER, IN FULL REMISSION (H): ICD-10-CM

## 2024-01-24 DIAGNOSIS — F11.90 CHRONIC, CONTINUOUS USE OF OPIOIDS: ICD-10-CM

## 2024-01-24 DIAGNOSIS — J43.9 PULMONARY EMPHYSEMA, UNSPECIFIED EMPHYSEMA TYPE (H): ICD-10-CM

## 2024-01-24 DIAGNOSIS — D18.02: ICD-10-CM

## 2024-01-24 PROCEDURE — G2012 BRIEF CHECK IN BY MD/QHP: HCPCS | Mod: 93 | Performed by: NURSE PRACTITIONER

## 2024-01-24 RX ORDER — OXYCODONE HYDROCHLORIDE 5 MG/1
5-10 TABLET ORAL EVERY 4 HOURS PRN
Qty: 150 TABLET | Refills: 0 | Status: SHIPPED | OUTPATIENT
Start: 2024-03-14 | End: 2024-05-08

## 2024-01-24 RX ORDER — OXYCODONE HYDROCHLORIDE 5 MG/1
5-10 TABLET ORAL EVERY 4 HOURS PRN
Qty: 150 TABLET | Refills: 0 | Status: SHIPPED | OUTPATIENT
Start: 2024-04-13 | End: 2024-05-08

## 2024-01-24 RX ORDER — OXYCODONE HYDROCHLORIDE 5 MG/1
5-10 TABLET ORAL EVERY 4 HOURS PRN
Qty: 150 TABLET | Refills: 0 | Status: SHIPPED | OUTPATIENT
Start: 2024-02-13 | End: 2024-05-08

## 2024-01-24 NOTE — PROGRESS NOTES
"Shania is a 73 year old who is being evaluated via a billable telephone visit.      What phone number would you like to be contacted at? [phone]  How would you like to obtain your AVS? Mail a copy    Distant Location (provider location):  On-site    Assessment & Plan     (G89.4) Chronic pain syndrome  Comment: stable  Plan: oxyCODONE (ROXICODONE) 5 MG tablet, oxyCODONE         (ROXICODONE) 5 MG tablet, oxyCODONE         (ROXICODONE) 5 MG tablet         checked.  The current medical regimen is effective;  continue present plan and medications. Refills given for 3 months.  Follow up in 3 months.     (F11.90) Chronic, continuous use of opioids  Comment:   Plan: oxyCODONE (ROXICODONE) 5 MG tablet, oxyCODONE         (ROXICODONE) 5 MG tablet, oxyCODONE         (ROXICODONE) 5 MG tablet            (J43.9) Pulmonary emphysema, unspecified emphysema type (H)  Comment: stable  Plan: The current medical regimen is effective;  continue present plan and medications.  Encouraged smoking cessation.     (F33.42) Recurrent major depressive disorder, in full remission (H24)  Comment: stable  Plan: The current medical regimen is effective;  continue present plan and medications.     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             BMI  Estimated body mass index is 28.16 kg/m  as calculated from the following:    Height as of 10/25/23: 1.57 m (5' 1.81\").    Weight as of 10/25/23: 69.4 kg (153 lb).           Subjective   Shania is a 73 year old, presenting for the following health issues:  Recheck Medication (Patient would like pain medication renewed)      1/24/2024     4:09 PM   Additional Questions   Roomed by Karey   Accompanied by None         1/24/2024     4:09 PM   Patient Reported Additional Medications   Patient reports taking the following new medications None     History of Present Illness       Reason for visit:  Pain Medication Refill    She eats 2-3 " servings of fruits and vegetables daily.She consumes 0 sweetened beverage(s) daily.She exercises with enough effort to increase her heart rate 9 or less minutes per day.  She exercises with enough effort to increase her heart rate 7 days per week.   She is taking medications regularly.       Her pain level has been stable.   She is doing well on her current medication regimen.    She had an MRI done many years ago.  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     Her COPD is stable.  She is thinking of trying to quit smoking, but recently lost her best friend and her ex-, so stress levels have been high.                Objective    Vitals - Patient Reported  Systolic (Patient Reported): 128  Diastolic (Patient Reported): 84  Weight (Patient Reported): 68.5 kg (151 lb)  SpO2 (Patient Reported): 97  Pulse (Patient Reported): 77  Pain Score: Severe Pain (7) (Shooting)        Physical Exam   General: Alert and no distress //Respiratory: No audible wheeze, cough, or shortness of breath // Psychiatric:  Appropriate affect, tone, and pace of words            Phone call duration: 18 minutes  Signed Electronically by: Lisa Merino NP

## 2024-05-08 ENCOUNTER — VIRTUAL VISIT (OUTPATIENT)
Dept: FAMILY MEDICINE | Facility: CLINIC | Age: 74
End: 2024-05-08
Payer: COMMERCIAL

## 2024-05-08 DIAGNOSIS — Z72.0 TOBACCO ABUSE: ICD-10-CM

## 2024-05-08 DIAGNOSIS — F11.90 CHRONIC, CONTINUOUS USE OF OPIOIDS: ICD-10-CM

## 2024-05-08 DIAGNOSIS — G89.4 CHRONIC PAIN SYNDROME: Primary | ICD-10-CM

## 2024-05-08 DIAGNOSIS — J43.9 PULMONARY EMPHYSEMA, UNSPECIFIED EMPHYSEMA TYPE (H): ICD-10-CM

## 2024-05-08 PROCEDURE — 99442 PR PHYSICIAN TELEPHONE EVALUATION 11-20 MIN: CPT | Mod: 93 | Performed by: NURSE PRACTITIONER

## 2024-05-08 RX ORDER — ALBUTEROL SULFATE 90 UG/1
AEROSOL, METERED RESPIRATORY (INHALATION)
Qty: 8.5 G | Status: SHIPPED | OUTPATIENT
Start: 2024-05-08

## 2024-05-08 RX ORDER — FLUTICASONE FUROATE, UMECLIDINIUM BROMIDE AND VILANTEROL TRIFENATATE 100; 62.5; 25 UG/1; UG/1; UG/1
1 POWDER RESPIRATORY (INHALATION) DAILY
Qty: 60 EACH | Refills: 12 | Status: SHIPPED | OUTPATIENT
Start: 2024-05-08

## 2024-05-08 RX ORDER — METHOCARBAMOL 750 MG/1
750 TABLET, FILM COATED ORAL EVERY 6 HOURS PRN
Qty: 180 TABLET | Refills: 3 | Status: SHIPPED | OUTPATIENT
Start: 2024-05-08

## 2024-05-08 RX ORDER — OXYCODONE HYDROCHLORIDE 5 MG/1
5-10 TABLET ORAL EVERY 4 HOURS PRN
Qty: 150 TABLET | Refills: 0 | Status: SHIPPED | OUTPATIENT
Start: 2024-06-12 | End: 2024-08-06

## 2024-05-08 RX ORDER — OXYCODONE HYDROCHLORIDE 5 MG/1
5-10 TABLET ORAL EVERY 4 HOURS PRN
Qty: 150 TABLET | Refills: 0 | Status: SHIPPED | OUTPATIENT
Start: 2024-05-13 | End: 2024-08-06

## 2024-05-08 RX ORDER — OXYCODONE HYDROCHLORIDE 5 MG/1
5-10 TABLET ORAL EVERY 4 HOURS PRN
Qty: 150 TABLET | Refills: 0 | Status: SHIPPED | OUTPATIENT
Start: 2024-07-12 | End: 2024-08-06

## 2024-05-08 ASSESSMENT — ANXIETY QUESTIONNAIRES
IF YOU CHECKED OFF ANY PROBLEMS ON THIS QUESTIONNAIRE, HOW DIFFICULT HAVE THESE PROBLEMS MADE IT FOR YOU TO DO YOUR WORK, TAKE CARE OF THINGS AT HOME, OR GET ALONG WITH OTHER PEOPLE: NOT DIFFICULT AT ALL
7. FEELING AFRAID AS IF SOMETHING AWFUL MIGHT HAPPEN: NOT AT ALL
2. NOT BEING ABLE TO STOP OR CONTROL WORRYING: NOT AT ALL
5. BEING SO RESTLESS THAT IT IS HARD TO SIT STILL: NOT AT ALL
3. WORRYING TOO MUCH ABOUT DIFFERENT THINGS: NOT AT ALL
IF YOU CHECKED OFF ANY PROBLEMS ON THIS QUESTIONNAIRE, HOW DIFFICULT HAVE THESE PROBLEMS MADE IT FOR YOU TO DO YOUR WORK, TAKE CARE OF THINGS AT HOME, OR GET ALONG WITH OTHER PEOPLE: NOT DIFFICULT AT ALL
4. TROUBLE RELAXING: NOT AT ALL
8. IF YOU CHECKED OFF ANY PROBLEMS, HOW DIFFICULT HAVE THESE MADE IT FOR YOU TO DO YOUR WORK, TAKE CARE OF THINGS AT HOME, OR GET ALONG WITH OTHER PEOPLE?: NOT DIFFICULT AT ALL
5. BEING SO RESTLESS THAT IT IS HARD TO SIT STILL: NOT AT ALL
GAD7 TOTAL SCORE: 0
6. BECOMING EASILY ANNOYED OR IRRITABLE: NOT AT ALL
1. FEELING NERVOUS, ANXIOUS, OR ON EDGE: NOT AT ALL
4. TROUBLE RELAXING: NOT AT ALL
7. FEELING AFRAID AS IF SOMETHING AWFUL MIGHT HAPPEN: NOT AT ALL
GAD7 TOTAL SCORE: 0
7. FEELING AFRAID AS IF SOMETHING AWFUL MIGHT HAPPEN: NOT AT ALL
1. FEELING NERVOUS, ANXIOUS, OR ON EDGE: NOT AT ALL
2. NOT BEING ABLE TO STOP OR CONTROL WORRYING: NOT AT ALL
3. WORRYING TOO MUCH ABOUT DIFFERENT THINGS: NOT AT ALL
GAD7 TOTAL SCORE: 0
6. BECOMING EASILY ANNOYED OR IRRITABLE: NOT AT ALL

## 2024-05-08 ASSESSMENT — PATIENT HEALTH QUESTIONNAIRE - PHQ9: SUM OF ALL RESPONSES TO PHQ QUESTIONS 1-9: 0

## 2024-05-08 NOTE — PROGRESS NOTES
"Shania is a 73 year old who is being evaluated via a billable telephone visit.    How would you like to obtain your AVS? Mail a copy  If the video visit is dropped, the invitation should be resent by: Call  Will anyone else be joining your video visit? No  Originating Location (pt. Location): Home    Distant Location (provider location):  On-site    Assessment & Plan     (G89.4) Chronic pain syndrome  Comment: stable  Plan: oxyCODONE (ROXICODONE) 5 MG tablet,         methocarbamol (ROBAXIN) 750 MG tablet,         oxyCODONE (ROXICODONE) 5 MG tablet, oxyCODONE         (ROXICODONE) 5 MG tablet         checked.  The current medical regimen is effective;  continue present plan and medications.  Refills given for 3 months.  Follow up phone visit in 3 months.     (F11.90) Chronic, continuous use of opioids  Comment:   Plan: oxyCODONE (ROXICODONE) 5 MG tablet, oxyCODONE         (ROXICODONE) 5 MG tablet, oxyCODONE         (ROXICODONE) 5 MG tablet            (Z72.0) Tobacco abuse  Comment:   Plan: albuterol (PROAIR HFA/PROVENTIL HFA/VENTOLIN         HFA) 108 (90 Base) MCG/ACT inhaler            Pulmonary emphysema  Not controlled  Plan: Fluticasone-Umeclidin-Vilant (TRELEGY ELLIPTA)         100-62.5-25 MCG/ACT oral inhaler        Start Trelegy, encouraged smoking cessation.             BMI  Estimated body mass index is 28.16 kg/m  as calculated from the following:    Height as of 10/25/23: 1.57 m (5' 1.81\").    Weight as of 10/25/23: 69.4 kg (153 lb).             Subjective   Shania is a 73 year old, presenting for the following health issues:  Med check      5/8/2024     9:49 AM   Additional Questions   Roomed by Leela RESENDIZ   Accompanied by self     History of Present Illness       Reason for visit:  Med check    She eats 2-3 servings of fruits and vegetables daily.She consumes 0 sweetened beverage(s) daily.She exercises with enough effort to increase her heart rate 20 to 29 minutes per day.  She exercises with enough effort to " "increase her heart rate 7 days per week.   She is taking medications regularly.     She has been needing to use her albuterol every 4 hours, as spring allergies have been worse.  She never got the Trelegy inhaler.    Her pain level has been stable. She is doing well on her current medication regimen.                 Objective    Vitals - Patient Reported  Systolic (Patient Reported): 127 (pt stated she took her bp yesterday)  Diastolic (Patient Reported): 32  Weight (Patient Reported): 68.5 kg (151 lb)  Height (Patient Reported): 5.2 cm (2.05\")  BMI (Based on Pt Reported Ht/Wt): 62065.02  Pain Score: Severe Pain (7)  Pain Loc: Neck (hip and back)        Physical Exam   General: Alert and no distress //Respiratory: No audible wheeze, cough, or shortness of breath // Psychiatric:  Appropriate affect, tone, and pace of words            Phone call duration: 17 minutes  Signed Electronically by: Lisa Merino NP    "

## 2024-08-06 ENCOUNTER — VIRTUAL VISIT (OUTPATIENT)
Dept: FAMILY MEDICINE | Facility: CLINIC | Age: 74
End: 2024-08-06
Payer: COMMERCIAL

## 2024-08-06 DIAGNOSIS — M25.511 ACUTE PAIN OF RIGHT SHOULDER: Primary | ICD-10-CM

## 2024-08-06 DIAGNOSIS — G89.4 CHRONIC PAIN SYNDROME: ICD-10-CM

## 2024-08-06 DIAGNOSIS — F11.90 CHRONIC, CONTINUOUS USE OF OPIOIDS: ICD-10-CM

## 2024-08-06 PROCEDURE — 99442 PR PHYSICIAN TELEPHONE EVALUATION 11-20 MIN: CPT | Mod: 93 | Performed by: NURSE PRACTITIONER

## 2024-08-06 RX ORDER — OXYCODONE HYDROCHLORIDE 5 MG/1
5-10 TABLET ORAL EVERY 4 HOURS PRN
Qty: 150 TABLET | Refills: 0 | Status: SHIPPED | OUTPATIENT
Start: 2024-10-10

## 2024-08-06 RX ORDER — OXYCODONE HYDROCHLORIDE 5 MG/1
5-10 TABLET ORAL EVERY 4 HOURS PRN
Qty: 150 TABLET | Refills: 0 | Status: SHIPPED | OUTPATIENT
Start: 2024-08-11

## 2024-08-06 RX ORDER — OXYCODONE HYDROCHLORIDE 5 MG/1
5-10 TABLET ORAL EVERY 4 HOURS PRN
Qty: 150 TABLET | Refills: 0 | Status: SHIPPED | OUTPATIENT
Start: 2024-09-10

## 2024-08-06 NOTE — PROGRESS NOTES
"Shania is a 74 year old who is being evaluated via a billable telephone visit.    What phone number would you like to be contacted at? [phone]  How would you like to obtain your AVS? Mail a copy  Originating Location (pt. Location): Home    Distant Location (provider location):  On-site    Assessment & Plan     (M25.511) Acute pain of right shoulder  (primary encounter diagnosis)  Comment:   Plan: MR Shoulder Right w/o Contrast        Will get an MRI to evaluate further.     (G89.4) Chronic pain syndrome  Comment: stable  Plan: oxyCODONE (ROXICODONE) 5 MG tablet, oxyCODONE         (ROXICODONE) 5 MG tablet, oxyCODONE         (ROXICODONE) 5 MG tablet         checked.  The current medical regimen is effective;  continue present plan and medications.  Follow up in October for AWV.     (F11.90) Chronic, continuous use of opioids  Comment:   Plan: oxyCODONE (ROXICODONE) 5 MG tablet, oxyCODONE         (ROXICODONE) 5 MG tablet, oxyCODONE         (ROXICODONE) 5 MG tablet        See above.       The longitudinal plan of care for the diagnosis(es)/condition(s) as documented were addressed during this visit. Due to the added complexity in care, I will continue to support Shania in the subsequent management and with ongoing continuity of care.    BMI  Estimated body mass index is 28.16 kg/m  as calculated from the following:    Height as of 10/25/23: 1.57 m (5' 1.81\").    Weight as of 10/25/23: 69.4 kg (153 lb).             Subjective   Shania is a 74 year old, presenting for the following health issues:  Follow Up (Renew pain medication)    History of Present Illness       Reason for visit:  Renew pain medication    She eats 2-3 servings of fruits and vegetables daily.She consumes 0 sweetened beverage(s) daily.She exercises with enough effort to increase her heart rate 20 to 29 minutes per day.  She exercises with enough effort to increase her heart rate 7 days per week.   She is taking medications regularly.     Right shoulder " pain since a fall in May.  She has pain with lifting her arm, decreased ROM.   She had surgery on this shoulder in 2009 for rotator cuff tear and current symptoms are similar to then.    Her pain is still managed with her current medication regimen.                 Objective    Vitals - Patient Reported  Systolic (Patient Reported): 113  Diastolic (Patient Reported): 58  Weight (Patient Reported): 63 kg (139 lb)  SpO2 (Patient Reported): 96  Pulse (Patient Reported): 72  Pain Score: Extreme Pain (8)  Pain Loc: Low Back (neck, whole back, right shoulder)      Vitals:  No vitals were obtained today due to virtual visit.    Physical Exam   General: Alert and no distress //Respiratory: No audible wheeze, cough, or shortness of breath // Psychiatric:  Appropriate affect, tone, and pace of words            Phone call duration: 18 minutes  Signed Electronically by: Lisa Merino NP

## 2024-09-12 ENCOUNTER — HOSPITAL ENCOUNTER (OUTPATIENT)
Dept: MRI IMAGING | Facility: HOSPITAL | Age: 74
Discharge: HOME | End: 2024-09-12
Attending: NURSE PRACTITIONER
Payer: COMMERCIAL

## 2024-09-12 ENCOUNTER — HOSPITAL ENCOUNTER (OUTPATIENT)
Dept: CT IMAGING | Facility: HOSPITAL | Age: 74
Discharge: HOME | End: 2024-09-12
Attending: NURSE PRACTITIONER
Payer: COMMERCIAL

## 2024-09-12 ENCOUNTER — HOSPITAL ENCOUNTER (OUTPATIENT)
Dept: ULTRASOUND IMAGING | Facility: HOSPITAL | Age: 74
Discharge: HOME | End: 2024-09-12
Attending: NURSE PRACTITIONER
Payer: COMMERCIAL

## 2024-09-12 DIAGNOSIS — Z87.891 PERSONAL HISTORY OF TOBACCO USE: ICD-10-CM

## 2024-09-12 DIAGNOSIS — E04.1 THYROID NODULE: ICD-10-CM

## 2024-09-12 DIAGNOSIS — M25.511 ACUTE PAIN OF RIGHT SHOULDER: ICD-10-CM

## 2024-09-12 PROCEDURE — 76536 US EXAM OF HEAD AND NECK: CPT

## 2024-09-12 PROCEDURE — 73221 MRI JOINT UPR EXTREM W/O DYE: CPT | Mod: RT

## 2024-09-12 PROCEDURE — 71271 CT THORAX LUNG CANCER SCR C-: CPT

## 2024-09-15 DIAGNOSIS — I25.10 CORONARY ARTERY CALCIFICATION SEEN ON CT SCAN: Primary | ICD-10-CM

## 2024-09-15 DIAGNOSIS — M75.100 TEAR OF ROTATOR CUFF, UNSPECIFIED LATERALITY, UNSPECIFIED TEAR EXTENT, UNSPECIFIED WHETHER TRAUMATIC: Primary | ICD-10-CM

## 2024-09-16 ENCOUNTER — TELEPHONE (OUTPATIENT)
Dept: FAMILY MEDICINE | Facility: CLINIC | Age: 74
End: 2024-09-16
Payer: COMMERCIAL

## 2024-09-16 NOTE — TELEPHONE ENCOUNTER
Left message on answering machine for patient to call clinic triage.   Please call us for this result message.  Thanks!  RONAK Babin

## 2024-09-16 NOTE — TELEPHONE ENCOUNTER
----- Message from Lisa Merino sent at 9/15/2024  7:03 PM CDT -----  The MRI of your shoulder shows a tear of the rotator cuff and severe amount of loss of cartilage.  I would like you to see an orthopedist.   I placed a referral and a  should contact you to set this up.

## 2024-09-16 NOTE — TELEPHONE ENCOUNTER
Patient called back and was informed of message per TORI Merino CNP.    Patient asked for results of CT chest and US.  Writer reviewed clinician's result messages for both of these imaging studies completed on 9/12/24.    Patient verbalized understanding and in agreement with plan.    BONITA RodriguezN, RN-[facility]

## 2024-09-27 NOTE — PROGRESS NOTES
ASSESSMENT & PLAN    Shania was seen today for pain.    Diagnoses and all orders for this visit:    Injury of right shoulder, initial encounter  -     XR Shoulder Right G/E 3 Views; Future    Rotator cuff arthropathy of right shoulder    Contusion of right lower leg, sequela  -     US Lower Extremity Venous Duplex Bilateral; Future    Pain and swelling of right lower leg  -     US Lower Extremity Venous Duplex Bilateral; Future    Other orders  -     Orthopedic  Referral        Right shoulder was primary concern today.  She also brought up the right lower leg, which was injured in the original fall. This appears consistent with contusion, possibly ongoing hematoma contributing to symptoms with swelling, bruised appearance. She has concern about possible DVT; low suspicion, but with LE swelling, will order US to eval for DVT.  From there, may be able to consider aspiration if hematoma is present, but I suspect not with duration of time since injury. Possible may need to rely on compression, time.  See below.  Questions answered. Discussed signs and symptoms that may indicate more serious issues; the patient was instructed to seek appropriate care if noted. Shania indicates understanding of these issues and agrees with the plan.        See Patient Instructions  Patient Instructions   Right shoulder:  Imaging reviewed.  Demonstrates rotator cuff arthropathy, or advanced degenerative changes in the right shoulder.  For this, plan imaging guided steroid injection next, we will get you set up with one of my colleagues for the injection.  We also discussed potential for physical therapy, and referral on to discuss further with orthopedic surgery.  For now, okay to work on simple motion exercises previous physical therapy.    Right lower leg:  Findings most consistent with contusion, though interesting to have this duration of swelling and symptoms.  Possible underlying hematoma.  I do not think blood clot is likely,  but we will obtain ultrasound to rule out DVT.  May use icing, elevating, compression for symptoms.  Depending on ultrasound findings, may have one of my colleagues also evaluate this with ultrasound at the time of the right shoulder injection, and if fluid present, potential for aspiration.    Advanced imaging is done by appointment. Please call [facility] ([facility]/[place]) [phone] , [facility] ([facility]/[place]) [phone] , or [facility] ([facility]) [phone]  to schedule your ultrasound.     Some insurance companies may require a prior authorization to be completed which can delay the time until you are able to schedule your appointment.       If you are active on [employer], you may have access to your test results before your provider is able to review the study and advise on next steps.      The clinic will plan to contact you with results either via phone or [employer]. If you have not heard from the clinic within 2-3 days following your US, please contact us at [phone] or via [employer].      If you have any further questions for your physician or physician s care team you can contact them thru [employer] or by calling [phone].      Evan Love [facility]      CC: Lisa Merino      -----  Chief Complaint   Patient presents with    Right Shoulder - Pain       SUBJECTIVE  Shania Holliday is a/an 74 year old female who is seen in consultation at the request of  Lisa Merino N.P. for evaluation of right shoulder.     The patient is seen by themselves.  The patient is Right handed    Onset: 4 month(s) ago. Patient describes injury as slipping and falling down a couple of stairs  Location of Pain: right shoulder, diffuse shoulder pain, radiating some down the arm  Worsened by: reaching, putting a jacket on,  Better with: Aspercreme  Treatments tried: iced, heat,  "aspercreme, elevated, immobilization  Associated symptoms: no distal numbness or tingling; denies swelling or warmth    Orthopedic/Surgical history: RTC repair in 2008, notes hx of pins  Social History/Occupation: Retired    **  Above information per rooming staff.  Additional history:  Right shoulder fine prior to fall. Did quite well, no residual issues after cuff repair.  + right shoulder noise.      REVIEW OF SYSTEMS:  Review of Systems    OBJECTIVE:  Ht 1.575 m (5' 2\")   Wt 69.4 kg (153 lb)   LMP  (LMP Unknown)   BMI 27.98 kg/m           Right Shoulder exam    ROM:      forward flexion 70-80, pain        abduction 45, pain       internal rotation no change belly press position       external rotation ~20, pain    Strength:      abduction 4/5       internal rotation 4/5       external rotation 3/5    Impingement testing: deferred    Skin:      no visible deformities       well perfused       capillary refill brisk    Sensation:      normal sensation over shoulder and upper extremity           **  Right lower leg:  Mild diffuse swelling, from anterior lower leg through ankle; also with more focal area prominence/swelling anterior lower leg. Areas of dusky ecchymosis appearance in lower leg.  Ankle grossly full ROM.  Distal pulses intact, cap refill brisk.        RADIOLOGY:  Final results and radiologist's interpretation, available in the [facility] health record.  Images were reviewed with the patient in the office today.  My personal interpretation of the performed imaging: cuff arthropathy on xray and MRI.        Recent Results (from the past 24 hour(s))   XR Shoulder Right G/E 3 Views    Narrative    RIGHT SHOULDER THREE OR MORE VIEWS   9/30/2024 10:40 AM     HISTORY:  Diffuse shoulder pain after a fall, known RTC tears with  MRI, further assess arthritis. Injury of right shoulder, initial  encounter.    COMPARISON: None.      Impression    IMPRESSION: Suture anchors in the proximal humerus. " Moderate  osteoarthrosis of the glenohumeral joint. There is probably  osteonecrosis in the superomedial aspect of the humeral head, where  there is subtle decreased density and minimal impaction of the  overlying subchondral bone plate. The bones are diffusely  demineralized. There is no dislocation. The humeral head is high  riding, raising the question of rotator cuff tear.    JENNY NEVILLE MD         SYSTEM ID:  [id number]               EXAM: MR SHOULDER RIGHT W/O CONTRAST  LOCATION: [facility]  DATE: 9/12/2024     INDICATION: Shoulder pain; Rotator cuff injury; No known automatically detected potential contraindications to imaging.  COMPARISON: None.  TECHNIQUE: Unenhanced.     FINDINGS:     ROTATOR CUFF:  -Supraspinatus/infraspinatus: There is a full-thickness tear of the mid supraspinatus tendon and anterior infraspinatus tendon measuring at least 19 mm in length. There is myotendinous retraction to the spinoglenoid notch with moderate to severe muscular   atrophy. There is attenuation/thinning of the supraspinatus tendon anterior to the described tear which may represent additional nonretracted tear.  -Subscapularis: Moderate subscapularis tendinopathy without tear or muscular atrophy.  -Teres minor: No tear or tendinopathy. Mild fatty atrophy.     CORACOACROMIAL ARCH:  -Morphology: There is superior subluxation of the humerus with remodeling of the acromial undersurface.  -Bursa: Fluid freely extends into the subacromial/subdeltoid bursa.     ACROMIOCLAVICULAR JOINT:   -Mild osteoarthritis.      LONG HEAD OF BICEPS TENDON:   -At least high-grade tear of the biceps tendon with distal retraction of some component of the tendon. There is a biceps tendon sheath effusion with tenosynovitis.     GLENOHUMERAL JOINT:   -Labrum: Diffuse labral degeneration.  -Cartilage: Full-thickness cartilage loss along the superior glenoid and articulating humeral head with bipolar subchondral cysts  and edema.   -Joint space: Large glenohumeral joint effusion and synovitis with extension into the subcoracoid recess and subacromial/subdeltoid bursa.     BONES:   -Channels and susceptibility compatible with prior rotator cuff repair. No fracture. No marrow replacing lesion.     SOFT TISSUES:   -Normal deltoid muscle bulk. Normal visualized chest wall and axilla.                                                                      IMPRESSION:  1.  Findings of severe right rotator cuff arthropathy including full-thickness glenohumeral joint cartilage loss, bipolar subchondral cysts and edema, superior subluxation of the humeral head, and acromial undersurface remodeling.  2.  Full-thickness tear of the mid supraspinatus and anterior infraspinatus tendons measuring at least 19 mm in length with myotendinous retraction to the spinoglenoid notch and moderate to severe muscular atrophy. Additionally, there is thinning of the   supraspinatus tendon anterior to this tear which may represent additional nonretracted tear.  3.  Moderate subscapularis tendinopathy without tear.  4.  At least high-grade tear of the proximal long head biceps tendon with distal retraction of torn fibers.  5.  Large glenohumeral joint effusion and synovitis with extension into the subcoracoid recess and subacromial/subdeltoid bursa.        ================================    Previous tib-fib XR:        [facility]  Outside Information  Results  XR Tibia and Fibula Right 2 Views (Order [id number])     XR Tibia and Fibula Right 2 Views  Order: [id number]  Impression    No fracture. No suspicious expansile lytic lesion. Small vascular phleboliths. Mild osteopenia.  Narrative    For Patients: As a result of the 21st Century Cures Act, medical imaging exams and procedure reports are released immediately into your electronic medical record. You may view this report before your referring provider. If you have questions,  please contact your health care provider.    EXAM: XR TIBIA AND FIBULA 2 VIEWS RIGHT  LOCATION: [facility]  DATE: 5/29/2024    INDICATION: Right lower leg pain.  COMPARISON: None.  Images on Order [id number]    Scan on 5/29/2024: XR TIBIA AND FIBULA 2 VIEWS RIGHT        Exam End: 05/29/24  1:43 PM    Specimen Collected: 05/29/24  1:43 PM Last Resulted: 05/29/24  1:45 PM   [facility]  Result Received: 08/06/24  9:32 AM

## 2024-09-30 ENCOUNTER — ANCILLARY PROCEDURE (OUTPATIENT)
Dept: GENERAL RADIOLOGY | Facility: CLINIC | Age: 74
End: 2024-09-30
Attending: PEDIATRICS
Payer: COMMERCIAL

## 2024-09-30 ENCOUNTER — OFFICE VISIT (OUTPATIENT)
Dept: ORTHOPEDICS | Facility: CLINIC | Age: 74
End: 2024-09-30
Attending: NURSE PRACTITIONER
Payer: COMMERCIAL

## 2024-09-30 VITALS — WEIGHT: 153 LBS | HEIGHT: 62 IN | BODY MASS INDEX: 28.16 KG/M2

## 2024-09-30 DIAGNOSIS — M12.811 ROTATOR CUFF ARTHROPATHY OF RIGHT SHOULDER: ICD-10-CM

## 2024-09-30 DIAGNOSIS — S80.11XS CONTUSION OF RIGHT LOWER LEG, SEQUELA: ICD-10-CM

## 2024-09-30 DIAGNOSIS — S49.91XA INJURY OF RIGHT SHOULDER, INITIAL ENCOUNTER: Primary | ICD-10-CM

## 2024-09-30 DIAGNOSIS — M79.661 PAIN AND SWELLING OF RIGHT LOWER LEG: ICD-10-CM

## 2024-09-30 DIAGNOSIS — M79.89 PAIN AND SWELLING OF RIGHT LOWER LEG: ICD-10-CM

## 2024-09-30 DIAGNOSIS — S49.91XA INJURY OF RIGHT SHOULDER, INITIAL ENCOUNTER: ICD-10-CM

## 2024-09-30 PROCEDURE — 99204 OFFICE O/P NEW MOD 45 MIN: CPT | Performed by: PEDIATRICS

## 2024-09-30 PROCEDURE — 73030 X-RAY EXAM OF SHOULDER: CPT | Mod: TC | Performed by: RADIOLOGY

## 2024-09-30 NOTE — PATIENT INSTRUCTIONS
Right shoulder:  Imaging reviewed.  Demonstrates rotator cuff arthropathy, or advanced degenerative changes in the right shoulder.  For this, plan imaging guided steroid injection next, we will get you set up with one of my colleagues for the injection.  We also discussed potential for physical therapy, and referral on to discuss further with orthopedic surgery.  For now, okay to work on simple motion exercises previous physical therapy.    Right lower leg:  Findings most consistent with contusion, though interesting to have this duration of swelling and symptoms.  Possible underlying hematoma.  I do not think blood clot is likely, but we will obtain ultrasound to rule out DVT.  May use icing, elevating, compression for symptoms.  Depending on ultrasound findings, may have one of my colleagues also evaluate this with ultrasound at the time of the right shoulder injection, and if fluid present, potential for aspiration.    Advanced imaging is done by appointment. Please call [facility] ([facility]/[place]) [phone] , [facility] ([facility]/[place]) [phone] , or [facility] ([facility]) [phone]  to schedule your ultrasound.     Some insurance companies may require a prior authorization to be completed which can delay the time until you are able to schedule your appointment.       If you are active on [employer], you may have access to your test results before your provider is able to review the study and advise on next steps.      The clinic will plan to contact you with results either via phone or [employer]. If you have not heard from the clinic within 2-3 days following your US, please contact us at [phone] or via [employer].      If you have any further questions for your physician or physician s care team you can contact them thru [employer] or by calling [phone].

## 2024-09-30 NOTE — LETTER
9/30/2024      Shania Holliday  [address] [place] [address]      Dear Colleague,    Thank you for referring your patient, Shania Holliday, to the [facility]. Please see a copy of my visit note below.    ASSESSMENT & PLAN    Shania was seen today for pain.    Diagnoses and all orders for this visit:    Injury of right shoulder, initial encounter  -     XR Shoulder Right G/E 3 Views; Future    Rotator cuff arthropathy of right shoulder    Contusion of right lower leg, sequela  -     US Lower Extremity Venous Duplex Bilateral; Future    Pain and swelling of right lower leg  -     US Lower Extremity Venous Duplex Bilateral; Future    Other orders  -     Orthopedic  Referral        Right shoulder was primary concern today.  She also brought up the right lower leg, which was injured in the original fall. This appears consistent with contusion, possibly ongoing hematoma contributing to symptoms with swelling, bruised appearance. She has concern about possible DVT; low suspicion, but with LE swelling, will order US to eval for DVT.  From there, may be able to consider aspiration if hematoma is present, but I suspect not with duration of time since injury. Possible may need to rely on compression, time.  See below.  Questions answered. Discussed signs and symptoms that may indicate more serious issues; the patient was instructed to seek appropriate care if noted. Shania indicates understanding of these issues and agrees with the plan.        See Patient Instructions  Patient Instructions   Right shoulder:  Imaging reviewed.  Demonstrates rotator cuff arthropathy, or advanced degenerative changes in the right shoulder.  For this, plan imaging guided steroid injection next, we will get you set up with one of my colleagues for the injection.  We also discussed potential for physical therapy, and referral on to discuss further with orthopedic surgery.  For now, okay to work on  simple motion exercises previous physical therapy.    Right lower leg:  Findings most consistent with contusion, though interesting to have this duration of swelling and symptoms.  Possible underlying hematoma.  I do not think blood clot is likely, but we will obtain ultrasound to rule out DVT.  May use icing, elevating, compression for symptoms.  Depending on ultrasound findings, may have one of my colleagues also evaluate this with ultrasound at the time of the right shoulder injection, and if fluid present, potential for aspiration.    Advanced imaging is done by appointment. Please call [facility] ([facility]/[place]) [phone] , [facility] ([facility]/[place]) [phone] , or [facility] ([facility]) [phone]  to schedule your ultrasound.     Some insurance companies may require a prior authorization to be completed which can delay the time until you are able to schedule your appointment.       If you are active on [employer], you may have access to your test results before your provider is able to review the study and advise on next steps.      The clinic will plan to contact you with results either via phone or [employer]. If you have not heard from the clinic within 2-3 days following your US, please contact us at [phone] or via [employer].      If you have any further questions for your physician or physician s care team you can contact them thru [employer] or by calling [phone].      Evan Love [facility]      CC: Lisa Merino      -----  Chief Complaint   Patient presents with     Right Shoulder - Pain       SUBJECTIVE  Shania Holliday is a/an 74 year old female who is seen in consultation at the request of  Lisa Merino N.P. for evaluation of right shoulder.     The patient is seen by themselves.  The patient is Right handed    Onset: 4 month(s) ago. Patient  "describes injury as slipping and falling down a couple of stairs  Location of Pain: right shoulder, diffuse shoulder pain, radiating some down the arm  Worsened by: reaching, putting a jacket on,  Better with: Aspercreme  Treatments tried: iced, heat, aspercreme, elevated, immobilization  Associated symptoms: no distal numbness or tingling; denies swelling or warmth    Orthopedic/Surgical history: RTC repair in 2008, notes hx of pins  Social History/Occupation: Retired    **  Above information per rooming staff.  Additional history:  Right shoulder fine prior to fall. Did quite well, no residual issues after cuff repair.  + right shoulder noise.      REVIEW OF SYSTEMS:  Review of Systems    OBJECTIVE:  Ht 1.575 m (5' 2\")   Wt 69.4 kg (153 lb)   LMP  (LMP Unknown)   BMI 27.98 kg/m           Right Shoulder exam    ROM:      forward flexion 70-80, pain        abduction 45, pain       internal rotation no change belly press position       external rotation ~20, pain    Strength:      abduction 4/5       internal rotation 4/5       external rotation 3/5    Impingement testing: deferred    Skin:      no visible deformities       well perfused       capillary refill brisk    Sensation:      normal sensation over shoulder and upper extremity           **  Right lower leg:  Mild diffuse swelling, from anterior lower leg through ankle; also with more focal area prominence/swelling anterior lower leg. Areas of dusky ecchymosis appearance in lower leg.  Ankle grossly full ROM.  Distal pulses intact, cap refill brisk.        RADIOLOGY:  Final results and radiologist's interpretation, available in the [facility] health record.  Images were reviewed with the patient in the office today.  My personal interpretation of the performed imaging: cuff arthropathy on xray and MRI.        Recent Results (from the past 24 hour(s))   XR Shoulder Right G/E 3 Views    Narrative    RIGHT SHOULDER THREE OR MORE VIEWS   9/30/2024 10:40 AM "     HISTORY:  Diffuse shoulder pain after a fall, known RTC tears with  MRI, further assess arthritis. Injury of right shoulder, initial  encounter.    COMPARISON: None.      Impression    IMPRESSION: Suture anchors in the proximal humerus. Moderate  osteoarthrosis of the glenohumeral joint. There is probably  osteonecrosis in the superomedial aspect of the humeral head, where  there is subtle decreased density and minimal impaction of the  overlying subchondral bone plate. The bones are diffusely  demineralized. There is no dislocation. The humeral head is high  riding, raising the question of rotator cuff tear.    JENNY NEVILLE MD         SYSTEM ID:  [id number]               EXAM: MR SHOULDER RIGHT W/O CONTRAST  LOCATION: [facility]  DATE: 9/12/2024     INDICATION: Shoulder pain; Rotator cuff injury; No known automatically detected potential contraindications to imaging.  COMPARISON: None.  TECHNIQUE: Unenhanced.     FINDINGS:     ROTATOR CUFF:  -Supraspinatus/infraspinatus: There is a full-thickness tear of the mid supraspinatus tendon and anterior infraspinatus tendon measuring at least 19 mm in length. There is myotendinous retraction to the spinoglenoid notch with moderate to severe muscular   atrophy. There is attenuation/thinning of the supraspinatus tendon anterior to the described tear which may represent additional nonretracted tear.  -Subscapularis: Moderate subscapularis tendinopathy without tear or muscular atrophy.  -Teres minor: No tear or tendinopathy. Mild fatty atrophy.     CORACOACROMIAL ARCH:  -Morphology: There is superior subluxation of the humerus with remodeling of the acromial undersurface.  -Bursa: Fluid freely extends into the subacromial/subdeltoid bursa.     ACROMIOCLAVICULAR JOINT:   -Mild osteoarthritis.      LONG HEAD OF BICEPS TENDON:   -At least high-grade tear of the biceps tendon with distal retraction of some component of the tendon. There is a  biceps tendon sheath effusion with tenosynovitis.     GLENOHUMERAL JOINT:   -Labrum: Diffuse labral degeneration.  -Cartilage: Full-thickness cartilage loss along the superior glenoid and articulating humeral head with bipolar subchondral cysts and edema.   -Joint space: Large glenohumeral joint effusion and synovitis with extension into the subcoracoid recess and subacromial/subdeltoid bursa.     BONES:   -Channels and susceptibility compatible with prior rotator cuff repair. No fracture. No marrow replacing lesion.     SOFT TISSUES:   -Normal deltoid muscle bulk. Normal visualized chest wall and axilla.                                                                      IMPRESSION:  1.  Findings of severe right rotator cuff arthropathy including full-thickness glenohumeral joint cartilage loss, bipolar subchondral cysts and edema, superior subluxation of the humeral head, and acromial undersurface remodeling.  2.  Full-thickness tear of the mid supraspinatus and anterior infraspinatus tendons measuring at least 19 mm in length with myotendinous retraction to the spinoglenoid notch and moderate to severe muscular atrophy. Additionally, there is thinning of the   supraspinatus tendon anterior to this tear which may represent additional nonretracted tear.  3.  Moderate subscapularis tendinopathy without tear.  4.  At least high-grade tear of the proximal long head biceps tendon with distal retraction of torn fibers.  5.  Large glenohumeral joint effusion and synovitis with extension into the subcoracoid recess and subacromial/subdeltoid bursa.        ================================    Previous tib-fib XR:        [facility]  Outside Information  Results  XR Tibia and Fibula Right 2 Views (Order [id number])     XR Tibia and Fibula Right 2 Views  Order: [id number]  Impression    No fracture. No suspicious expansile lytic lesion. Small vascular phleboliths. Mild  osteopenia.  Narrative    For Patients: As a result of the 21st Century Cures Act, medical imaging exams and procedure reports are released immediately into your electronic medical record. You may view this report before your referring provider. If you have questions, please contact your health care provider.    EXAM: XR TIBIA AND FIBULA 2 VIEWS RIGHT  LOCATION: [facility]  DATE: 5/29/2024    INDICATION: Right lower leg pain.  COMPARISON: None.  Images on Order [id number]    Scan on 5/29/2024: XR TIBIA AND FIBULA 2 VIEWS RIGHT        Exam End: 05/29/24  1:43 PM    Specimen Collected: 05/29/24  1:43 PM Last Resulted: 05/29/24  1:45 PM   Received From: [facility]  Result Received: 08/06/24  9:32 AM                  Again, thank you for allowing me to participate in the care of your patient.        Sincerely,        Evan Love DO

## 2024-10-02 ENCOUNTER — TELEPHONE (OUTPATIENT)
Dept: ORTHOPEDICS | Facility: CLINIC | Age: 74
End: 2024-10-02

## 2024-10-02 ENCOUNTER — HOSPITAL ENCOUNTER (OUTPATIENT)
Dept: ULTRASOUND IMAGING | Facility: HOSPITAL | Age: 74
Discharge: HOME | End: 2024-10-02
Attending: PEDIATRICS | Admitting: PEDIATRICS
Payer: COMMERCIAL

## 2024-10-02 DIAGNOSIS — M79.89 PAIN AND SWELLING OF RIGHT LOWER LEG: ICD-10-CM

## 2024-10-02 DIAGNOSIS — S80.11XS CONTUSION OF RIGHT LOWER LEG, SEQUELA: ICD-10-CM

## 2024-10-02 DIAGNOSIS — M79.661 PAIN AND SWELLING OF RIGHT LOWER LEG: ICD-10-CM

## 2024-10-02 PROCEDURE — 93971 EXTREMITY STUDY: CPT | Mod: RT

## 2024-10-02 NOTE — TELEPHONE ENCOUNTER
Called patient, LVM regarding US results.  She is requested to call back.    Marissa King ATC, CSCS  Dr. Canales's Clinical Coordinator  [facility] Sports Medicine Team

## 2024-10-02 NOTE — TELEPHONE ENCOUNTER
Please notify:  No blood clot. Appearance of hematoma.  Can proceed with plan discussed at visit.  Thanks.  Evan Love DO, ROMEL      Recent Results (from the past 24 hour(s))   US Lower Extremity Venous Duplex Right    Narrative    EXAM: US LOWER EXTREMITY VENOUS DUPLEX RIGHT  LOCATION: [facility]  DATE: 10/2/2024    INDICATION:  Contusion of right lower leg, sequela, Pain and swelling of right lower leg, Pain and swelling of right lower leg  COMPARISON: None.  TECHNIQUE: Venous Duplex ultrasound of the right lower extremity with and without compression, augmentation and duplex. Color flow and spectral Doppler with waveform analysis performed.    FINDINGS: Exam includes the common femoral, femoral, popliteal, and contralateral common femoral veins as well as segmentally visualized deep calf veins and greater saphenous vein.     RIGHT: No deep vein thrombosis. No superficial thrombophlebitis. No popliteal cyst.    There is a heterogeneous hematoma in the anterior calf measuring 7.2 x 0.9 cm. No sonographic abnormality in the area of the palpable lump in the mid thigh.      Impression    IMPRESSION:  1.  No deep venous thrombosis in the right lower extremity.  2.  Heterogeneous hematoma in the anterior calf.

## 2024-10-03 NOTE — TELEPHONE ENCOUNTER
Other: pt is calling Lopez back. Please call her back     Could we send this information to you in [employer] or would you prefer to receive a phone call?:   Patient would prefer a phone call   Okay to leave a detailed message?: Yes at Home number on file [phone] (home)

## 2024-10-08 NOTE — PROGRESS NOTES
ASSESSMENT & PLAN    Shania was seen today for pain and pain.    Diagnoses and all orders for this visit:    Primary osteoarthritis of right shoulder  -     Large Joint Injection/Arthocentesis: R glenohumeral joint    Rotator cuff arthropathy of right shoulder    Hematoma    Contusion of right lower leg, sequela        Shania Holliday was seen today in request for a right glenohumeral injection for previously diagnosed osteoarthritis and full thickness rotator cuff tear. Patient is hoping to postpone surgery. She has not tried an injection into the shoulder for this.    Patient was independently assessed by me and was deemed appropriate for this procedure. Risks and benefits were discussed with good understanding including, but not limited to, the risks of bleeding, reaction to the medication, infection, and the possibility of the treatment being ineffective. The patient tolerated the procedure well with no complications.    After visit care instructions were discussed in person and provided in AVS.    We also evaluated her right leg with a bedside ultrasound exam which she has had pain after an injury. Her are area of pain appeared to be consistent with a coagulated hematoma with surrounding subcutaneous swelling at the area of her bruise. There was no celina pocket of fluid to drain on my exam today.    Follow-up: as previously planned. Can repeat injection in 3 months if good relief.    Gagandeep Denny MD  [facility] SP[facility] MEDICINE [facility]- October 8, 2024    Chief Complaint   Patient presents with    Right Shoulder - Pain    Right Lower Leg - Pain       Shania Holliday is a 74 year old female who is seen for right shoulder injection and possible aspiration of right lower leg hematoma.    Referred by: Evan Love DO  Previous Diagnosis: Rotator cuff arthropathy of right shoulder; contusion of right lower leg     Previous injections: n/a  Other treatments tried: iced, heat, aspercreme,  "elevated, immobilization       REVIEW OF SYSTEMS:  Negative other than the symptoms noted above in the HPI.      OBJECTIVE:  Ht 1.575 m (5' 2\")   Wt 69.4 kg (153 lb)   LMP  (LMP Unknown)   BMI 27.98 kg/m     General: healthy, alert and in no distress  Skin: no suspicious lesions or rash noted near area of injection   CV: distal perfusion intact near area of injection  Neuro: Normal light sensory exam near area of injection    RADIOLOGY:  I independently reviewed and agree with the final results and radiologist's interpretation from the imaging relevant to today's visit, available in the [facility] health record including DVT/ LE US from 10/2/24, MRI shoulder from 9/12/24, xray shoulder 9/30/24.      No results found for: \"A1C\"  Patient on blood thinners: ASA    Large Joint Injection/Arthocentesis: R glenohumeral joint    Date/Time: 10/9/2024 2:03 PM    Performed by: Gagandeep Denny MD  Authorized by: Gagandeep Denny MD    Indications:  Pain and osteoarthritis  Needle Size:  20 G  Guidance: ultrasound    Approach:  Posterior  Location:  Shoulder      Site:  R glenohumeral joint  Medications:  4 mL ROPivacaine 5 MG/ML; 6 mg betamethasone acet & sod phos 6 (3-3) MG/ML  Outcome:  Tolerated well, no immediate complications  Procedure discussed: discussed risks, benefits, and alternatives    Consent Given by:  Patient  Timeout: timeout called immediately prior to procedure    Prep: patient was prepped and draped in usual sterile fashion     Patient tolerated right GH steroid injection. Ultrasound guidance was required to ensure correct needle placement for injection and avoid vital surrounding structures. Ultrasound guided mages were permanently stored. Aftercare instructions given to patient. Plan to follow-up as previously discussed with referring provider.     Gagandeep Denny MD        "

## 2024-10-09 ENCOUNTER — OFFICE VISIT (OUTPATIENT)
Dept: ORTHOPEDICS | Facility: CLINIC | Age: 74
End: 2024-10-09
Payer: COMMERCIAL

## 2024-10-09 VITALS — HEIGHT: 62 IN | BODY MASS INDEX: 28.16 KG/M2 | WEIGHT: 153 LBS

## 2024-10-09 DIAGNOSIS — M19.011 PRIMARY OSTEOARTHRITIS OF RIGHT SHOULDER: Primary | ICD-10-CM

## 2024-10-09 DIAGNOSIS — M12.811 ROTATOR CUFF ARTHROPATHY OF RIGHT SHOULDER: ICD-10-CM

## 2024-10-09 DIAGNOSIS — S80.11XS CONTUSION OF RIGHT LOWER LEG, SEQUELA: ICD-10-CM

## 2024-10-09 DIAGNOSIS — T14.8XXA HEMATOMA: ICD-10-CM

## 2024-10-09 PROCEDURE — 99207 PR DROP WITH A PROCEDURE: CPT | Performed by: STUDENT IN AN ORGANIZED HEALTH CARE EDUCATION/TRAINING PROGRAM

## 2024-10-09 PROCEDURE — 20611 DRAIN/INJ JOINT/BURSA W/US: CPT | Mod: RT | Performed by: STUDENT IN AN ORGANIZED HEALTH CARE EDUCATION/TRAINING PROGRAM

## 2024-10-09 RX ORDER — BETAMETHASONE SODIUM PHOSPHATE AND BETAMETHASONE ACETATE 3; 3 MG/ML; MG/ML
6 INJECTION, SUSPENSION INTRA-ARTICULAR; INTRALESIONAL; INTRAMUSCULAR; SOFT TISSUE
Status: SHIPPED | OUTPATIENT
Start: 2024-10-09

## 2024-10-09 RX ORDER — ROPIVACAINE HYDROCHLORIDE 5 MG/ML
4 INJECTION, SOLUTION EPIDURAL; INFILTRATION; PERINEURAL
Status: SHIPPED | OUTPATIENT
Start: 2024-10-09

## 2024-10-09 RX ADMIN — ROPIVACAINE HYDROCHLORIDE 4 ML: 5 INJECTION, SOLUTION EPIDURAL; INFILTRATION; PERINEURAL at 14:03

## 2024-10-09 RX ADMIN — BETAMETHASONE SODIUM PHOSPHATE AND BETAMETHASONE ACETATE 6 MG: 3; 3 INJECTION, SUSPENSION INTRA-ARTICULAR; INTRALESIONAL; INTRAMUSCULAR; SOFT TISSUE at 14:03

## 2024-10-09 NOTE — PATIENT INSTRUCTIONS
[facility] Injection Discharge Instructions    Procedure: right glenohumeral (shoulder joint) steroid injection    You may shower, however avoid swimming, tub baths or hot tubs for 24 hours following your procedure  You may have a mild to moderate increase in pain for several days following the injection.  It may take up to 14 days for the steroid medication to start working although you may feel the effect as early as a few days after the procedure.  You may use ice packs for 10-15 minutes, 3 to 4 times a day at the injection site for comfort  You may use anti-inflammatory medications (such as Ibuprofen or Aleve or Advil) or Tylenol for pain control if necessary  If you were fasting, you may resume your normal diet and medications after the procedure  If you have diabetes, check your blood sugar more frequently than usual as your blood sugar may be higher than normal for 10-14 days following a steroid injection. Contact your doctor who manages your diabetes if your blood sugar is higher than usual    If you experience any of the following, call [facility] @ [phone] or [phone]  - Fever over 100 degree F  - Swelling, bleeding, redness, drainage, warmth at the injection site  - New or worsening pain

## 2024-10-09 NOTE — LETTER
10/9/2024      Shania Holliday  [address] [place] [address]      Dear Colleague,    Thank you for referring your patient, Shania Holliday, to the [facility]. Please see a copy of my visit note below.    ASSESSMENT & PLAN    Shania was seen today for pain and pain.    Diagnoses and all orders for this visit:    Primary osteoarthritis of right shoulder  -     Large Joint Injection/Arthocentesis: R glenohumeral joint    Rotator cuff arthropathy of right shoulder    Hematoma    Contusion of right lower leg, sequela        Shania Holliday was seen today in request for a right glenohumeral injection for previously diagnosed osteoarthritis and full thickness rotator cuff tear. Patient is hoping to postpone surgery. She has not tried an injection into the shoulder for this.    Patient was independently assessed by me and was deemed appropriate for this procedure. Risks and benefits were discussed with good understanding including, but not limited to, the risks of bleeding, reaction to the medication, infection, and the possibility of the treatment being ineffective. The patient tolerated the procedure well with no complications.    After visit care instructions were discussed in person and provided in AVS.    We also evaluated her right leg with a bedside ultrasound exam which she has had pain after an injury. Her are area of pain appeared to be consistent with a coagulated hematoma with surrounding subcutaneous swelling at the area of her bruise. There was no celina pocket of fluid to drain on my exam today.    Follow-up: as previously planned. Can repeat injection in 3 months if good relief.    Gagandeep Denny MD  [facility]IC[facility]    SUBJECTIVE- October 8, 2024    Chief Complaint   Patient presents with     Right Shoulder - Pain     Right Lower Leg - Pain       Shania Holliday is a 74 year old female who is seen for right shoulder injection and  "possible aspiration of right lower leg hematoma.    Referred by: Evan Love DO  Previous Diagnosis: Rotator cuff arthropathy of right shoulder; contusion of right lower leg     Previous injections: n/a  Other treatments tried: iced, heat, aspercreme, elevated, immobilization       REVIEW OF SYSTEMS:  Negative other than the symptoms noted above in the HPI.      OBJECTIVE:  Ht 1.575 m (5' 2\")   Wt 69.4 kg (153 lb)   LMP  (LMP Unknown)   BMI 27.98 kg/m     General: healthy, alert and in no distress  Skin: no suspicious lesions or rash noted near area of injection   CV: distal perfusion intact near area of injection  Neuro: Normal light sensory exam near area of injection    RADIOLOGY:  I independently reviewed and agree with the final results and radiologist's interpretation from the imaging relevant to today's visit, available in the [facility] health record including DVT/ LE US from 10/2/24, MRI shoulder from 9/12/24, xray shoulder 9/30/24.      No results found for: \"A1C\"  Patient on blood thinners: ASA    Large Joint Injection/Arthocentesis: R glenohumeral joint    Date/Time: 10/9/2024 2:03 PM    Performed by: Gagandeep Denny MD  Authorized by: Gagandeep Denny MD    Indications:  Pain and osteoarthritis  Needle Size:  20 G  Guidance: ultrasound    Approach:  Posterior  Location:  Shoulder      Site:  R glenohumeral joint  Medications:  4 mL ROPivacaine 5 MG/ML; 6 mg betamethasone acet & sod phos 6 (3-3) MG/ML  Outcome:  Tolerated well, no immediate complications  Procedure discussed: discussed risks, benefits, and alternatives    Consent Given by:  Patient  Timeout: timeout called immediately prior to procedure    Prep: patient was prepped and draped in usual sterile fashion     Patient tolerated right GH steroid injection. Ultrasound guidance was required to ensure correct needle placement for injection and avoid vital surrounding structures. Ultrasound guided mages were permanently stored. Aftercare " instructions given to patient. Plan to follow-up as previously discussed with referring provider.     Gagandeep Denny MD          Again, thank you for allowing me to participate in the care of your patient.        Sincerely,        Gagandeep Denny MD

## 2024-10-11 ENCOUNTER — OFFICE VISIT (OUTPATIENT)
Dept: CARDIOLOGY | Facility: CLINIC | Age: 74
End: 2024-10-11
Attending: NURSE PRACTITIONER
Payer: COMMERCIAL

## 2024-10-11 VITALS
RESPIRATION RATE: 16 BRPM | DIASTOLIC BLOOD PRESSURE: 72 MMHG | SYSTOLIC BLOOD PRESSURE: 132 MMHG | WEIGHT: 145 LBS | BODY MASS INDEX: 26.68 KG/M2 | HEIGHT: 62 IN | HEART RATE: 71 BPM

## 2024-10-11 DIAGNOSIS — Z87.891 PERSONAL HISTORY OF TOBACCO USE, PRESENTING HAZARDS TO HEALTH: ICD-10-CM

## 2024-10-11 DIAGNOSIS — E78.5 HYPERLIPIDEMIA, UNSPECIFIED HYPERLIPIDEMIA TYPE: ICD-10-CM

## 2024-10-11 DIAGNOSIS — Z01.810 PRE-OPERATIVE CARDIOVASCULAR EXAMINATION: ICD-10-CM

## 2024-10-11 DIAGNOSIS — I25.10 CORONARY ARTERY CALCIFICATION SEEN ON CT SCAN: Primary | ICD-10-CM

## 2024-10-11 PROCEDURE — 99204 OFFICE O/P NEW MOD 45 MIN: CPT | Performed by: GENERAL ACUTE CARE HOSPITAL

## 2024-10-11 RX ORDER — ROSUVASTATIN CALCIUM 20 MG/1
20 TABLET, COATED ORAL DAILY
Qty: 90 TABLET | Refills: 3 | Status: SHIPPED | OUTPATIENT
Start: 2024-10-11

## 2024-10-11 NOTE — PROGRESS NOTES
HEART CARE ENCOUNTER NOTE      Thank you, Lisa Merino NP, for asking the [facility] Heart Care team to see Ms. Shania Holliday to evaluate coronary artery calcification.    Assessment/Recommendations   Assessment:    Severe coronary artery calcification noted on chest CT 9/12/2024. She denies anginal symptoms.  Preoperative cardiovascular examination prior to possible right shoulder surgery with general anesthesia ye to be scheduled. She reports adequate functional capacity with no cardiac symptoms and is therefore at a low risk of perioperative major adverse cardiac events.  Hyperlipidemia. Last  mg/dL.  Tobacco use.  Body mass index is 26.52 kg/m .    Plan:  Switch simvastatin to rosuvastatin 20 mg daily for greater lipid-lowering and cardiovascular risk reduction.  She would benefit from taking at least a low dose of aspirin 81 mg daily and already takes daily full-strength aspirin.  In the absence of anginal symptoms, stress testing is not necessary.  If she does develop chest pain or worsening shortness of breath, stress testing with stress cardiac MRI or pharmacologic nuclear perfusion would be warranted.  Okay to proceed with her possible right shoulder surgery from a cardiac perspective.  Recommended she quit smoking.  Follow-up with me as needed.         History of Present Illness   Ms. Shania Holliday is a 74 year old female with a significant past history of HLD and tobacco use presenting for evaluation of coronary artery calcification. This was noted incidentally on a chest CT done for lung cancer screening 9/12/2024. Prior chest CTs have also noted coronary calcification.    She has some mild exertional dyspnea which is stable. No exertional chest pain and she can ascend 2 flights of stairs without much difficulty. No light headedness/dizziness, pre-syncope, syncope, lower extremity swelling, palpitations, paroxysmal nocturnal dyspnea (PND), or orthopnea.     She has a right shoulder injury  and anticipates needing surgery. She still smoked 1/2 ppd. She has taken simvastatin for years. She takes 1-2 full strength aspirins daily due to aches and pains.       Cardiac Problems and Cardiac Diagnostics     Most Recent Cardiac testing:  ECG dated 12/14/2020 (personaly reviewed and interpreted): SR, normal ECG    ECHO 3/2/2015 (report reviewed):   1. Normal biventricular systolic function. LVEF estimate 60-65%.    2. No significant valvular abnormalities.    3. The atrial septum is intact as assessed by agitated dextrose bubble study .  4. Normal IVC with preserved respiratory variability.    Stress test 10/25/2007 (report reviewed):   Normal dobutamine echo without infarct or ischemia at adequate rate pressure product     CT chest 9/12/2024 (report reviewed):  1.  Multiple new small nodules in the upper lungs, likely infectious or inflammatory process.  2.  No evidence for lung cancer.  3.  Severe, three-vessel coronary artery calcification.     Medications  Allergies   Current Outpatient Medications   Medication Sig Dispense Refill    albuterol (PROAIR HFA/PROVENTIL HFA/VENTOLIN HFA) 108 (90 Base) MCG/ACT inhaler INHALE 2 PUFFS INTO THE LUNGS EVERY 4 HOURS AS NEEDED FOR SHORTNESS OF BREATH / DYSPNEA OR WHEEZING 8.5 g PRN    ASPERCREME 10 % EX CREA daily use x4 times      ASPIRIN 325 MG OR TBEC 2 TABLET daily      Biotin w/ Vitamins C & E (HAIR/SKIN/NAILS PO) Take 2 capsules by mouth daily      CALCIUM + D OR BID      cetirizine HCl (ZYRTEC ALLERGY) 10 MG CAPS Take 10 mg by mouth daily Take  by mouth. 90 capsule PRN    fluticasone (FLONASE) 50 MCG/ACT nasal spray Spray 2 sprays into both nostrils as needed       Fluticasone-Umeclidin-Vilant (TRELEGY ELLIPTA) 100-62.5-25 MCG/ACT oral inhaler Inhale 1 puff into the lungs daily 60 each 12    hydrOXYzine HCl (ATARAX) 25 MG tablet TAKE 2  TABLETS BY MOUTH once DAILY AT BEDTIME AS NEEDED FOR ANXIETY 180 tablet 3    methocarbamol (ROBAXIN) 750 MG tablet Take 1  "tablet (750 mg) by mouth every 6 hours as needed for muscle spasms 180 tablet 3    MULTIVITAMIN TABS   OR Take one daily      oxyCODONE (ROXICODONE) 5 MG tablet Take 1-2 tablets (5-10 mg) by mouth every 4 hours as needed for severe pain Take 1-2 tablets every 4-6 hours as needed for pain, max of 5 tablets per day 150 tablet 0    oxyCODONE (ROXICODONE) 5 MG tablet Take 1-2 tablets (5-10 mg) by mouth every 4 hours as needed for severe pain Take 1-2 tablets every 4-6 hours as needed for pain, max of 5 tablets per day 150 tablet 0    oxyCODONE (ROXICODONE) 5 MG tablet Take 1-2 tablets (5-10 mg) by mouth every 4 hours as needed for severe pain Take 1-2 tablets every 4-6 hours as needed for pain, max of 5 tablets per day 150 tablet 0    pantoprazole (PROTONIX) 40 MG EC tablet Take 1 tablet (40 mg) by mouth daily 90 tablet 3    PARoxetine (PAXIL) 20 MG tablet TAKE 1/2 TABLETS (10 MG) BY MOUTH ONCE DAILY 45 tablet 3    simvastatin (ZOCOR) 20 MG tablet Take  by mouth. 1 TABLET AT BEDTIME 90 tablet 3      Allergies   Allergen Reactions    Macadamia Nut Oil Shortness Of Breath and Swelling     Macadamia nuts    Neurontin [Gabapentin] Visual Disturbance     Ended up in the hospital. strabismus, required an MRI.     Aleve [Naproxen Sodium]     Aloe Vera Rash    Baclofen Other (See Comments)     Headaches    Clinoril [Sulindac]     Darvocet [Propoxyphene N-Apap]     Dustmites [Dust Mites]     Flagyl [Metronidazole] Hives     Dentist put pt on medication. Caused hives and swelling.     Fosamax     Mold     Morphine     Msg [Monosodium Glutamate]     Nsaids     Trazodone [Trazodone And Nefazodone] Visual Disturbance     strabismus    Vioxx [Lumiracoxib And Rofecoxib]         Physical Examination Review of Systems   /72 (BP Location: Right arm, Patient Position: Sitting, Cuff Size: Adult Regular)   Pulse 71   Resp 16   Ht 1.575 m (5' 2\")   Wt 65.8 kg (145 lb)   LMP  (LMP Unknown)   BMI 26.52 kg/m    Body mass index is " 26.52 kg/m .  Wt Readings from Last 3 Encounters:   10/11/24 65.8 kg (145 lb)   10/09/24 69.4 kg (153 lb)   09/30/24 69.4 kg (153 lb)       General Appearance:   Pleasant  female, appears  stated age. no acute distress, normal body habitus   ENT/Mouth: membranes moist, no apparent gingival bleeding.      EYES:  no scleral icterus, normal conjunctivae   Neck: no carotid bruits. No anterior cervical lymphadenopaty   Respiratory:   lungs are clear to auscultation, no rales or wheezing, equal chest wall expansion    Cardiovascular:   Regular rhythm, normal rate. Normal first and second heart sounds with no murmurs, rubs, or gallops; the carotid, radial and posterior tibial pulses are intact, Jugular venous pressure normal, no edema bilaterally    Abdomen/GI:  no organomegaly, masses, bruits, or tenderness; bowel sounds are present   Extremities: no cyanosis or clubbing   Skin: no xanthelasma, warm.    Heme/lymph/ Immunology No apparent bleeding noted.   Neurologic: Alert and oriented. normal gait, no tremors     Psychiatric: Pleasant, calm, appropriate affect.    A complete 10 system review of systems was performed and is negative except as mentioned in the HPI/subjective.         Past History   Past Medical History:   Past Medical History:   Diagnosis Date    Backache, unspecified     Cervicalgia     Depressive disorder, not elsewhere classified     Disorder of bone and cartilage, unspecified     Esophageal reflux     Essential and other specified forms of tremor     Headache(784.0)     Hemangioma of unspecified site     Other genital herpes     Other specified idiopathic peripheral neuropathy     Pure hypercholesterolemia     Temporomandibular joint disorders, unspecified     Tobacco use disorder        Past Surgical History:   Past Surgical History:   Procedure Laterality Date    COLONOSCOPY  12/15/2014    HC REMOVE TONSILS/ADENOIDS,<12 Y/O      T & A <13y.o.    SURGICAL HISTORY OF -       left knee surgery     SURGICAL HISTORY OF -       laparotomy    SURGICAL HISTORY OF -       tubal ligation    SURGICAL HISTORY OF -       sinus surgery    SURGICAL HISTORY OF -       oral surgery    SURGICAL HISTORY OF -  Right 2009    right rotator cuff tear       Family History:   Family History   Problem Relation Age of Onset    Hypertension Mother     Cerebrovascular Disease Mother          from aneurysm    Arthritis Mother     Respiratory Mother         asthma    Eye Disorder Mother     Circulatory Mother         DVT    C.A.D. Father         MI in 50's    Cerebrovascular Disease Father         aneurysm x 3    Arthritis Father     Respiratory Father         asthma    Eye Disorder Father     Diabetes Maternal Uncle         type 2    Arthritis Sister         x2    Asthma Sister     Cerebrovascular Disease Sister         stroke     Dementia Sister     Anxiety Disorder Sister         Panic    Cancer Paternal Aunt         melanoma    Cancer Other         nephew melanoma    Respiratory Sister         asthma    Arthritis Sister     Eye Disorder Other         nephew macular degeneration    Cancer Son         Advanced Melanoma    Breast Cancer No family hx of     Cancer - colorectal No family hx of         Social History:   Social History     Socioeconomic History    Marital status: Single     Spouse name: Not on file    Number of children: Not on file    Years of education: Not on file    Highest education level: Not on file   Occupational History    Not on file   Tobacco Use    Smoking status: Every Day     Current packs/day: 0.50     Average packs/day: 0.5 packs/day for 38.0 years (19.0 ttl pk-yrs)     Types: Cigarettes    Smokeless tobacco: Never    Tobacco comments:     4-5 per day.    Vaping Use    Vaping status: Never Used   Substance and Sexual Activity    Alcohol use: No     Alcohol/week: 0.0 standard drinks of alcohol    Drug use: No    Sexual activity: Yes     Partners: Male     Birth control/protection: None   Other Topics  Concern    Parent/sibling w/ CABG, MI or angioplasty before 65F 55M? Yes   Social History Narrative    Dairy/d 2-3 servings/d.     Caffeine 2-4 servings/d    Exercise 7 x week walking    Sunscreen used - Yes    Seatbelts used - Yes    Working smoke/CO detectors in the home - Yes    Guns stored in the home - No    Self Breast Exams - Yes    Self Testicular Exam - NOT APPLICABLE    Eye Exam up to date - No    Dental Exam up to date - Yes    Pap Smear up to date - no    Mammogram up to date - appt tomorrow    PSA up to date - NOT APPLICABLE    Dexa Scan up to date - 2007    Flex Sig / Colonoscopy up to date - Yes,colonscopy 2003,repeat in 10yrs    Immunizations up to date - Yes-Td 2002,needs Hep A #2 today    Abuse: Current or Past(Physical, Sexual or Emotional)- No    Do you feel safe in your environment - Yes             Social Determinants of Health     Financial Resource Strain: Low Risk  (1/24/2024)    Financial Resource Strain     Within the past 12 months, have you or your family members you live with been unable to get utilities (heat, electricity) when it was really needed?: No   Food Insecurity: Low Risk  (1/24/2024)    Food Insecurity     Within the past 12 months, did you worry that your food would run out before you got money to buy more?: No     Within the past 12 months, did the food you bought just not last and you didn t have money to get more?: No   Transportation Needs: Low Risk  (1/24/2024)    Transportation Needs     Within the past 12 months, has lack of transportation kept you from medical appointments, getting your medicines, non-medical meetings or appointments, work, or from getting things that you need?: No   Physical Activity: Not on file   Stress: Not on file   Social Connections: Not on file   Interpersonal Safety: Low Risk  (10/25/2023)    Interpersonal Safety     Do you feel physically and emotionally safe where you currently live?: Yes     Within the past 12 months, have you been hit,  slapped, kicked or otherwise physically hurt by someone?: No     Within the past 12 months, have you been humiliated or emotionally abused in other ways by your partner or ex-partner?: No   Housing Stability: Low Risk  (1/24/2024)    Housing Stability     Do you have housing? : Yes     Are you worried about losing your housing?: No              Lab Results    Chemistry/lipid CBC Cardiac Enzymes/BNP/TSH/INR   Lab Results   Component Value Date    CHOL 195 10/25/2023    HDL 75 10/25/2023     (H) 10/25/2023    TRIG 83 10/25/2023    CR 0.75 10/25/2023    BUN 4.5 (L) 10/25/2023    POTASSIUM 4.4 10/25/2023     (L) 10/25/2023    CO2 23 10/25/2023      Lab Results   Component Value Date    WBC 8.6 10/25/2023    HGB 13.5 10/25/2023    HCT 40.4 10/25/2023    MCV 87 10/25/2023     (H) 10/25/2023    Lab Results   Component Value Date    TSH 0.81 10/25/2023    INR 0.99 12/14/2020          Renzo Bellamy MD formerly [facility]  Non-Invasive Cardiologist  [facility]  Pager [phone]

## 2024-10-11 NOTE — PATIENT INSTRUCTIONS
"We will switch your cholesterol to a better version called \"rosuvastatin\". Your regular doctor can refill this in the future.  If you feel chest pain or more trouble breathing, we can schedule a stress test.  Okay to have shoulder surgery.  See me back as needed.   "

## 2024-10-11 NOTE — LETTER
10/11/2024    Lisa Merino, NP  [address], [address]  [place] [address]    RE: Shania Holliday       Dear Colleague,     I had the pleasure of seeing Shania Holliday in the [facility].  HEART CARE ENCOUNTER NOTE      Thank you, Lisa Merino NP, for asking the [facility] team to see Ms. Shania Holliday to evaluate coronary artery calcification.    Assessment/Recommendations   Assessment:    Severe coronary artery calcification noted on chest CT 9/12/2024. She denies anginal symptoms.  Preoperative cardiovascular examination prior to possible right shoulder surgery with general anesthesia ye to be scheduled. She reports adequate functional capacity with no cardiac symptoms and is therefore at a low risk of perioperative major adverse cardiac events.  Hyperlipidemia. Last  mg/dL.  Tobacco use.  Body mass index is 26.52 kg/m .    Plan:  Switch simvastatin to rosuvastatin 20 mg daily for greater lipid-lowering and cardiovascular risk reduction.  She would benefit from taking at least a low dose of aspirin 81 mg daily and already takes daily full-strength aspirin.  In the absence of anginal symptoms, stress testing is not necessary.  If she does develop chest pain or worsening shortness of breath, stress testing with stress cardiac MRI or pharmacologic nuclear perfusion would be warranted.  Okay to proceed with her possible right shoulder surgery from a cardiac perspective.  Recommended she quit smoking.  Follow-up with me as needed.         History of Present Illness   Ms. Shania Holliday is a 74 year old female with a significant past history of HLD and tobacco use presenting for evaluation of coronary artery calcification. This was noted incidentally on a chest CT done for lung cancer screening 9/12/2024. Prior chest CTs have also noted coronary calcification.    She has some mild exertional dyspnea which is stable. No exertional chest pain and she can ascend 2 flights  of stairs without much difficulty. No light headedness/dizziness, pre-syncope, syncope, lower extremity swelling, palpitations, paroxysmal nocturnal dyspnea (PND), or orthopnea.     She has a right shoulder injury and anticipates needing surgery. She still smoked 1/2 ppd. She has taken simvastatin for years. She takes 1-2 full strength aspirins daily due to aches and pains.       Cardiac Problems and Cardiac Diagnostics     Most Recent Cardiac testing:  ECG dated 12/14/2020 (personaly reviewed and interpreted): SR, normal ECG    ECHO 3/2/2015 (report reviewed):   1. Normal biventricular systolic function. LVEF estimate 60-65%.    2. No significant valvular abnormalities.    3. The atrial septum is intact as assessed by agitated dextrose bubble study .  4. Normal IVC with preserved respiratory variability.    Stress test 10/25/2007 (report reviewed):   Normal dobutamine echo without infarct or ischemia at adequate rate pressure product     CT chest 9/12/2024 (report reviewed):  1.  Multiple new small nodules in the upper lungs, likely infectious or inflammatory process.  2.  No evidence for lung cancer.  3.  Severe, three-vessel coronary artery calcification.     Medications  Allergies   Current Outpatient Medications   Medication Sig Dispense Refill     albuterol (PROAIR HFA/PROVENTIL HFA/VENTOLIN HFA) 108 (90 Base) MCG/ACT inhaler INHALE 2 PUFFS INTO THE LUNGS EVERY 4 HOURS AS NEEDED FOR SHORTNESS OF BREATH / DYSPNEA OR WHEEZING 8.5 g PRN     ASPERCREME 10 % EX CREA daily use x4 times       ASPIRIN 325 MG OR TBEC 2 TABLET daily       Biotin w/ Vitamins C & E (HAIR/SKIN/NAILS PO) Take 2 capsules by mouth daily       CALCIUM + D OR BID       cetirizine HCl (ZYRTEC ALLERGY) 10 MG CAPS Take 10 mg by mouth daily Take  by mouth. 90 capsule PRN     fluticasone (FLONASE) 50 MCG/ACT nasal spray Spray 2 sprays into both nostrils as needed        Fluticasone-Umeclidin-Vilant (TRELEGY ELLIPTA) 100-62.5-25 MCG/ACT oral inhaler  Inhale 1 puff into the lungs daily 60 each 12     hydrOXYzine HCl (ATARAX) 25 MG tablet TAKE 2  TABLETS BY MOUTH once DAILY AT BEDTIME AS NEEDED FOR ANXIETY 180 tablet 3     methocarbamol (ROBAXIN) 750 MG tablet Take 1 tablet (750 mg) by mouth every 6 hours as needed for muscle spasms 180 tablet 3     MULTIVITAMIN TABS   OR Take one daily       oxyCODONE (ROXICODONE) 5 MG tablet Take 1-2 tablets (5-10 mg) by mouth every 4 hours as needed for severe pain Take 1-2 tablets every 4-6 hours as needed for pain, max of 5 tablets per day 150 tablet 0     oxyCODONE (ROXICODONE) 5 MG tablet Take 1-2 tablets (5-10 mg) by mouth every 4 hours as needed for severe pain Take 1-2 tablets every 4-6 hours as needed for pain, max of 5 tablets per day 150 tablet 0     oxyCODONE (ROXICODONE) 5 MG tablet Take 1-2 tablets (5-10 mg) by mouth every 4 hours as needed for severe pain Take 1-2 tablets every 4-6 hours as needed for pain, max of 5 tablets per day 150 tablet 0     pantoprazole (PROTONIX) 40 MG EC tablet Take 1 tablet (40 mg) by mouth daily 90 tablet 3     PARoxetine (PAXIL) 20 MG tablet TAKE 1/2 TABLETS (10 MG) BY MOUTH ONCE DAILY 45 tablet 3     simvastatin (ZOCOR) 20 MG tablet Take  by mouth. 1 TABLET AT BEDTIME 90 tablet 3      Allergies   Allergen Reactions     Macadamia Nut Oil Shortness Of Breath and Swelling     Macadamia nuts     Neurontin [Gabapentin] Visual Disturbance     Ended up in the hospital. strabismus, required an MRI.      Aleve [Naproxen Sodium]      Aloe Vera Rash     Baclofen Other (See Comments)     Headaches     Clinoril [Sulindac]      Darvocet [Propoxyphene N-Apap]      Dustmites [Dust Mites]      Flagyl [Metronidazole] Hives     Dentist put pt on medication. Caused hives and swelling.      Fosamax      Mold      Morphine      Msg [Monosodium Glutamate]      Nsaids      Trazodone [Trazodone And Nefazodone] Visual Disturbance     strabismus     Vioxx [Lumiracoxib And Rofecoxib]         Physical  "Examination Review of Systems   /72 (BP Location: Right arm, Patient Position: Sitting, Cuff Size: Adult Regular)   Pulse 71   Resp 16   Ht 1.575 m (5' 2\")   Wt 65.8 kg (145 lb)   LMP  (LMP Unknown)   BMI 26.52 kg/m    Body mass index is 26.52 kg/m .  Wt Readings from Last 3 Encounters:   10/11/24 65.8 kg (145 lb)   10/09/24 69.4 kg (153 lb)   09/30/24 69.4 kg (153 lb)       General Appearance:   Pleasant  female, appears  stated age. no acute distress, normal body habitus   ENT/Mouth: membranes moist, no apparent gingival bleeding.      EYES:  no scleral icterus, normal conjunctivae   Neck: no carotid bruits. No anterior cervical lymphadenopaty   Respiratory:   lungs are clear to auscultation, no rales or wheezing, equal chest wall expansion    Cardiovascular:   Regular rhythm, normal rate. Normal first and second heart sounds with no murmurs, rubs, or gallops; the carotid, radial and posterior tibial pulses are intact, Jugular venous pressure normal, no edema bilaterally    Abdomen/GI:  no organomegaly, masses, bruits, or tenderness; bowel sounds are present   Extremities: no cyanosis or clubbing   Skin: no xanthelasma, warm.    Heme/lymph/ Immunology No apparent bleeding noted.   Neurologic: Alert and oriented. normal gait, no tremors     Psychiatric: Pleasant, calm, appropriate affect.    A complete 10 system review of systems was performed and is negative except as mentioned in the HPI/subjective.         Past History   Past Medical History:   Past Medical History:   Diagnosis Date     Backache, unspecified      Cervicalgia      Depressive disorder, not elsewhere classified      Disorder of bone and cartilage, unspecified      Esophageal reflux      Essential and other specified forms of tremor      Headache(784.0)      Hemangioma of unspecified site      Other genital herpes      Other specified idiopathic peripheral neuropathy      Pure hypercholesterolemia      Temporomandibular joint disorders, " unspecified      Tobacco use disorder        Past Surgical History:   Past Surgical History:   Procedure Laterality Date     COLONOSCOPY  12/15/2014     HC REMOVE TONSILS/ADENOIDS,<12 Y/O      T & A <13y.o.     SURGICAL HISTORY OF -       left knee surgery     SURGICAL HISTORY OF -       laparotomy     SURGICAL HISTORY OF -       tubal ligation     SURGICAL HISTORY OF -       sinus surgery     SURGICAL HISTORY OF -       oral surgery     SURGICAL HISTORY OF -  Right 2009    right rotator cuff tear       Family History:   Family History   Problem Relation Age of Onset     Hypertension Mother      Cerebrovascular Disease Mother          from aneurysm     Arthritis Mother      Respiratory Mother         asthma     Eye Disorder Mother      Circulatory Mother         DVT     C.A.D. Father         MI in 50's     Cerebrovascular Disease Father         aneurysm x 3     Arthritis Father      Respiratory Father         asthma     Eye Disorder Father      Diabetes Maternal Uncle         type 2     Arthritis Sister         x2     Asthma Sister      Cerebrovascular Disease Sister         stroke      Dementia Sister      Anxiety Disorder Sister         Panic     Cancer Paternal Aunt         melanoma     Cancer Other         nephew melanoma     Respiratory Sister         asthma     Arthritis Sister      Eye Disorder Other         nephew macular degeneration     Cancer Son         Advanced Melanoma     Breast Cancer No family hx of      Cancer - colorectal No family hx of         Social History:   Social History     Socioeconomic History     Marital status: Single     Spouse name: Not on file     Number of children: Not on file     Years of education: Not on file     Highest education level: Not on file   Occupational History     Not on file   Tobacco Use     Smoking status: Every Day     Current packs/day: 0.50     Average packs/day: 0.5 packs/day for 38.0 years (19.0 ttl pk-yrs)     Types: Cigarettes     Smokeless tobacco:  Never     Tobacco comments:     4-5 per day.    Vaping Use     Vaping status: Never Used   Substance and Sexual Activity     Alcohol use: No     Alcohol/week: 0.0 standard drinks of alcohol     Drug use: No     Sexual activity: Yes     Partners: Male     Birth control/protection: None   Other Topics Concern     Parent/sibling w/ CABG, MI or angioplasty before 65F 55M? Yes   Social History Narrative    Dairy/d 2-3 servings/d.     Caffeine 2-4 servings/d    Exercise 7 x week walking    Sunscreen used - Yes    Seatbelts used - Yes    Working smoke/CO detectors in the home - Yes    Guns stored in the home - No    Self Breast Exams - Yes    Self Testicular Exam - NOT APPLICABLE    Eye Exam up to date - No    Dental Exam up to date - Yes    Pap Smear up to date - no    Mammogram up to date - appt tomorrow    PSA up to date - NOT APPLICABLE    Dexa Scan up to date - 2007    Flex Sig / Colonoscopy up to date - Yes,colonscopy 2003,repeat in 10yrs    Immunizations up to date - Yes-Td 2002,needs Hep A #2 today    Abuse: Current or Past(Physical, Sexual or Emotional)- No    Do you feel safe in your environment - Yes             Social Determinants of Health     Financial Resource Strain: Low Risk  (1/24/2024)    Financial Resource Strain      Within the past 12 months, have you or your family members you live with been unable to get utilities (heat, electricity) when it was really needed?: No   Food Insecurity: Low Risk  (1/24/2024)    Food Insecurity      Within the past 12 months, did you worry that your food would run out before you got money to buy more?: No      Within the past 12 months, did the food you bought just not last and you didn t have money to get more?: No   Transportation Needs: Low Risk  (1/24/2024)    Transportation Needs      Within the past 12 months, has lack of transportation kept you from medical appointments, getting your medicines, non-medical meetings or appointments, work, or from getting things  that you need?: No   Physical Activity: Not on file   Stress: Not on file   Social Connections: Not on file   Interpersonal Safety: Low Risk  (10/25/2023)    Interpersonal Safety      Do you feel physically and emotionally safe where you currently live?: Yes      Within the past 12 months, have you been hit, slapped, kicked or otherwise physically hurt by someone?: No      Within the past 12 months, have you been humiliated or emotionally abused in other ways by your partner or ex-partner?: No   Housing Stability: Low Risk  (1/24/2024)    Housing Stability      Do you have housing? : Yes      Are you worried about losing your housing?: No              Lab Results    Chemistry/lipid CBC Cardiac Enzymes/BNP/TSH/INR   Lab Results   Component Value Date    CHOL 195 10/25/2023    HDL 75 10/25/2023     (H) 10/25/2023    TRIG 83 10/25/2023    CR 0.75 10/25/2023    BUN 4.5 (L) 10/25/2023    POTASSIUM 4.4 10/25/2023     (L) 10/25/2023    CO2 23 10/25/2023      Lab Results   Component Value Date    WBC 8.6 10/25/2023    HGB 13.5 10/25/2023    HCT 40.4 10/25/2023    MCV 87 10/25/2023     (H) 10/25/2023    Lab Results   Component Value Date    TSH 0.81 10/25/2023    INR 0.99 12/14/2020          Renzo Bellamy MD [facility]  Non-Invasive Cardiologist  [facility]  Pager [phone]      Thank you for allowing me to participate in the care of your patient.      Sincerely,     Renzo Bellamy MD     [facility]  cc:   Lisa Merino, NP  [address], [facility] [address]  [place] [address]

## 2024-10-29 ENCOUNTER — OFFICE VISIT (OUTPATIENT)
Dept: FAMILY MEDICINE | Facility: CLINIC | Age: 74
End: 2024-10-29
Payer: COMMERCIAL

## 2024-10-29 VITALS
DIASTOLIC BLOOD PRESSURE: 69 MMHG | HEIGHT: 61 IN | HEART RATE: 71 BPM | OXYGEN SATURATION: 95 % | BODY MASS INDEX: 26.83 KG/M2 | SYSTOLIC BLOOD PRESSURE: 118 MMHG | TEMPERATURE: 97.6 F | WEIGHT: 142.1 LBS

## 2024-10-29 DIAGNOSIS — Z00.00 ENCOUNTER FOR MEDICARE ANNUAL WELLNESS EXAM: Primary | ICD-10-CM

## 2024-10-29 DIAGNOSIS — F11.90 CHRONIC, CONTINUOUS USE OF OPIOIDS: ICD-10-CM

## 2024-10-29 DIAGNOSIS — Z72.0 TOBACCO ABUSE: ICD-10-CM

## 2024-10-29 DIAGNOSIS — E78.5 HYPERLIPIDEMIA LDL GOAL <130: ICD-10-CM

## 2024-10-29 DIAGNOSIS — F41.9 ANXIETY: ICD-10-CM

## 2024-10-29 DIAGNOSIS — Z12.31 VISIT FOR SCREENING MAMMOGRAM: ICD-10-CM

## 2024-10-29 DIAGNOSIS — J43.9 PULMONARY EMPHYSEMA, UNSPECIFIED EMPHYSEMA TYPE (H): ICD-10-CM

## 2024-10-29 DIAGNOSIS — F33.42 MAJOR DEPRESSIVE DISORDER, RECURRENT, IN FULL REMISSION (H): ICD-10-CM

## 2024-10-29 DIAGNOSIS — K21.9 GASTROESOPHAGEAL REFLUX DISEASE WITHOUT ESOPHAGITIS: ICD-10-CM

## 2024-10-29 DIAGNOSIS — G89.4 CHRONIC PAIN SYNDROME: ICD-10-CM

## 2024-10-29 LAB
ALBUMIN SERPL BCG-MCNC: 4.3 G/DL (ref 3.5–5.2)
ALP SERPL-CCNC: 101 U/L (ref 40–150)
ALT SERPL W P-5'-P-CCNC: 14 U/L (ref 0–50)
ANION GAP SERPL CALCULATED.3IONS-SCNC: 11 MMOL/L (ref 7–15)
AST SERPL W P-5'-P-CCNC: 22 U/L (ref 0–45)
BILIRUB SERPL-MCNC: 0.4 MG/DL
BUN SERPL-MCNC: 7.5 MG/DL (ref 8–23)
CALCIUM SERPL-MCNC: 9.2 MG/DL (ref 8.8–10.4)
CHLORIDE SERPL-SCNC: 99 MMOL/L (ref 98–107)
CHOLEST SERPL-MCNC: 174 MG/DL
CREAT SERPL-MCNC: 0.81 MG/DL (ref 0.51–0.95)
EGFRCR SERPLBLD CKD-EPI 2021: 76 ML/MIN/1.73M2
ERYTHROCYTE [DISTWIDTH] IN BLOOD BY AUTOMATED COUNT: 15.4 % (ref 10–15)
FASTING STATUS PATIENT QL REPORTED: YES
FASTING STATUS PATIENT QL REPORTED: YES
GLUCOSE SERPL-MCNC: 96 MG/DL (ref 70–99)
HCO3 SERPL-SCNC: 26 MMOL/L (ref 22–29)
HCT VFR BLD AUTO: 37.5 % (ref 35–47)
HDLC SERPL-MCNC: 76 MG/DL
HGB BLD-MCNC: 12.2 G/DL (ref 11.7–15.7)
LDLC SERPL CALC-MCNC: 83 MG/DL
MCH RBC QN AUTO: 28.7 PG (ref 26.5–33)
MCHC RBC AUTO-ENTMCNC: 32.5 G/DL (ref 31.5–36.5)
MCV RBC AUTO: 88 FL (ref 78–100)
NONHDLC SERPL-MCNC: 98 MG/DL
PLATELET # BLD AUTO: 410 10E3/UL (ref 150–450)
POTASSIUM SERPL-SCNC: 4.2 MMOL/L (ref 3.4–5.3)
PROT SERPL-MCNC: 6.9 G/DL (ref 6.4–8.3)
RBC # BLD AUTO: 4.25 10E6/UL (ref 3.8–5.2)
SODIUM SERPL-SCNC: 136 MMOL/L (ref 135–145)
TRIGL SERPL-MCNC: 75 MG/DL
WBC # BLD AUTO: 8.8 10E3/UL (ref 4–11)

## 2024-10-29 PROCEDURE — 80061 LIPID PANEL: CPT | Performed by: NURSE PRACTITIONER

## 2024-10-29 PROCEDURE — 80053 COMPREHEN METABOLIC PANEL: CPT | Performed by: NURSE PRACTITIONER

## 2024-10-29 PROCEDURE — 90662 IIV NO PRSV INCREASED AG IM: CPT | Performed by: NURSE PRACTITIONER

## 2024-10-29 PROCEDURE — 85027 COMPLETE CBC AUTOMATED: CPT | Performed by: NURSE PRACTITIONER

## 2024-10-29 PROCEDURE — G0439 PPPS, SUBSEQ VISIT: HCPCS | Mod: 25 | Performed by: NURSE PRACTITIONER

## 2024-10-29 PROCEDURE — 36415 COLL VENOUS BLD VENIPUNCTURE: CPT | Performed by: NURSE PRACTITIONER

## 2024-10-29 PROCEDURE — G0481 DRUG TEST DEF 8-14 CLASSES: HCPCS | Performed by: NURSE PRACTITIONER

## 2024-10-29 PROCEDURE — G0008 ADMIN INFLUENZA VIRUS VAC: HCPCS | Performed by: NURSE PRACTITIONER

## 2024-10-29 PROCEDURE — 99214 OFFICE O/P EST MOD 30 MIN: CPT | Performed by: NURSE PRACTITIONER

## 2024-10-29 RX ORDER — ALBUTEROL SULFATE 90 UG/1
INHALANT RESPIRATORY (INHALATION)
Qty: 8.5 G | Status: SHIPPED | OUTPATIENT
Start: 2024-10-29

## 2024-10-29 RX ORDER — PANTOPRAZOLE SODIUM 40 MG/1
40 TABLET, DELAYED RELEASE ORAL DAILY
Qty: 90 TABLET | Refills: 3 | Status: SHIPPED | OUTPATIENT
Start: 2024-10-29

## 2024-10-29 RX ORDER — PAROXETINE 20 MG/1
TABLET, FILM COATED ORAL
Qty: 45 TABLET | Refills: 3 | Status: SHIPPED | OUTPATIENT
Start: 2024-10-29

## 2024-10-29 RX ORDER — OXYCODONE HYDROCHLORIDE 5 MG/1
5-10 TABLET ORAL EVERY 4 HOURS PRN
Qty: 150 TABLET | Refills: 0 | Status: SHIPPED | OUTPATIENT
Start: 2024-12-09

## 2024-10-29 RX ORDER — OXYCODONE HYDROCHLORIDE 5 MG/1
5-10 TABLET ORAL EVERY 4 HOURS PRN
Qty: 150 TABLET | Refills: 0 | Status: SHIPPED | OUTPATIENT
Start: 2025-01-08

## 2024-10-29 RX ORDER — HYDROXYZINE HYDROCHLORIDE 25 MG/1
TABLET, FILM COATED ORAL
Qty: 180 TABLET | Refills: 3 | Status: SHIPPED | OUTPATIENT
Start: 2024-10-29

## 2024-10-29 RX ORDER — METHOCARBAMOL 750 MG/1
750 TABLET, FILM COATED ORAL EVERY 6 HOURS PRN
Qty: 180 TABLET | Refills: 3 | Status: SHIPPED | OUTPATIENT
Start: 2024-10-29

## 2024-10-29 RX ORDER — OXYCODONE HYDROCHLORIDE 5 MG/1
5-10 TABLET ORAL EVERY 4 HOURS PRN
Qty: 150 TABLET | Refills: 0 | Status: SHIPPED | OUTPATIENT
Start: 2024-11-09

## 2024-10-29 RX ORDER — FLUTICASONE FUROATE, UMECLIDINIUM BROMIDE AND VILANTEROL TRIFENATATE 100; 62.5; 25 UG/1; UG/1; UG/1
1 POWDER RESPIRATORY (INHALATION) DAILY
Qty: 60 EACH | Refills: 12 | Status: SHIPPED | OUTPATIENT
Start: 2024-10-29

## 2024-10-29 SDOH — HEALTH STABILITY: PHYSICAL HEALTH: ON AVERAGE, HOW MANY DAYS PER WEEK DO YOU ENGAGE IN MODERATE TO STRENUOUS EXERCISE (LIKE A BRISK WALK)?: 5 DAYS

## 2024-10-29 SDOH — HEALTH STABILITY: PHYSICAL HEALTH: ON AVERAGE, HOW MANY MINUTES DO YOU ENGAGE IN EXERCISE AT THIS LEVEL?: 30 MIN

## 2024-10-29 ASSESSMENT — PAIN SCALES - GENERAL: PAINLEVEL_OUTOF10: SEVERE PAIN (7)

## 2024-10-29 ASSESSMENT — SOCIAL DETERMINANTS OF HEALTH (SDOH): HOW OFTEN DO YOU GET TOGETHER WITH FRIENDS OR RELATIVES?: MORE THAN THREE TIMES A WEEK

## 2024-10-29 ASSESSMENT — PATIENT HEALTH QUESTIONNAIRE - PHQ9: SUM OF ALL RESPONSES TO PHQ QUESTIONS 1-9: 6

## 2024-10-29 NOTE — LETTER

## 2024-10-29 NOTE — PROGRESS NOTES
Preventive Care Visit  [facility]  Lisa Merino, NP, Nurse Practitioner - Family  Oct 29, 2024      Assessment & Plan     (Z00.00) Encounter for Medicare annual wellness exam  (primary encounter diagnosis)  Comment:   Plan:     (Z12.31) Visit for screening mammogram  Comment:   Plan: MA Screening Bilateral w/ Sherif            (E78.5) Hyperlipidemia LDL goal <130  Comment: stable  Plan: Lipid panel reflex to direct LDL Non-fasting,         Comprehensive metabolic panel (BMP + Alb, Alk         Phos, ALT, AST, Total. Bili, TP)        She was changed to rosuvastatin by cardiology.  The current medical regimen is effective;  continue present plan and medications.     (K21.9) Gastroesophageal reflux disease without esophagitis  Comment: stable  Plan: pantoprazole (PROTONIX) 40 MG EC tablet, CBC         with platelets        The current medical regimen is effective;  continue present plan and medications.     (F33.42) Major depressive disorder, recurrent, in full remission (H)  Comment: stable  Plan: PARoxetine (PAXIL) 20 MG tablet        The current medical regimen is effective;  continue present plan and medications.     (G89.4) Chronic pain syndrome  Comment: stable  Plan: [id number] - Urine Drug Confirmation Panel         (Comprehensive), oxyCODONE (ROXICODONE) 5 MG         tablet, oxyCODONE (ROXICODONE) 5 MG tablet,         oxyCODONE (ROXICODONE) 5 MG tablet,         methocarbamol (ROBAXIN) 750 MG tablet         reviewed.  New CSA reviewed and signed. The current medical regimen is effective;  continue present plan and medications.  Refills for the next three months done.  Follow up in 3 months via virtual visit.     (F11.90) Chronic, continuous use of opioids  Comment:   Plan: oxyCODONE (ROXICODONE) 5 MG tablet, oxyCODONE         (ROXICODONE) 5 MG tablet, oxyCODONE         (ROXICODONE) 5 MG tablet        See above.     (J43.9) Pulmonary emphysema, unspecified emphysema type  "(H)  Comment: stable  Plan: Fluticasone-Umeclidin-Vilant (TRELEGY ELLIPTA)         100-62.5-25 MCG/ACT oral inhaler        The current medical regimen is effective;  continue present plan and medications.  Encouraged smoking cessation.     (F41.9) Anxiety  Comment: stable  Plan: hydrOXYzine HCl (ATARAX) 25 MG tablet        The current medical regimen is effective;  continue present plan and medications.     (Z72.0) Tobacco abuse  Comment:   Plan: albuterol (PROAIR HFA/PROVENTIL HFA/VENTOLIN         HFA) 108 (90 Base) MCG/ACT inhaler        Encouraged smoking cessation.             Nicotine/Tobacco Cessation  She reports that she has been smoking cigarettes. She has a 19 pack-year smoking history. She has never used smokeless tobacco.  Nicotine/Tobacco Cessation Plan  Information offered: Patient not interested at this time      BMI  Estimated body mass index is 27 kg/m  as calculated from the following:    Height as of this encounter: 1.545 m (5' 0.83\").    Weight as of this encounter: 64.5 kg (142 lb 1.6 oz).       Counseling  Appropriate preventive services were addressed with this patient via screening, questionnaire, or discussion as appropriate for fall prevention, nutrition, physical activity, Tobacco-use cessation, social engagement, weight loss and cognition.  Checklist reviewing preventive services available has been given to the patient.  Reviewed patient's diet, addressing concerns and/or questions.   The patient was instructed to see the dentist every 6 months.   Updated plan of care.  Patient reported difficulty with activities of daily living were addressed today.Information on urinary incontinence and treatment options given to patient.   The patient's PHQ-9 score is consistent with mild depression. She was provided with information regarding depression.   I have reviewed Opioid Use Disorder and Substance Use Disorder risk factors and made any needed referrals. I have reviewed the current pain " treatment plan including non-opioid treatment options.          Michael Jauregui is a 74 year old, presenting for the following:  Annual Visit        10/29/2024    10:14 AM   Additional Questions   Roomed by Lindsey POLLACK  Her mood is stable.  She is doing well on her current dose of Paxil and denies any side effects.     Her chronic pain is stable and she is doing well on her current medication regimen.      CSA -- Encounter Level on 08/04/2015:     [Media Unavailable] Controlled Substance Agreement - Scan on 8/6/2015  2:50 PM: CONTROLLED SUBSTANCE AGREEMENT       CSA -- Patient Level:     [Media Unavailable] Controlled Substance Agreement - Opioid - Scan on 10/25/2023  3:16 PM   [Media Unavailable] Controlled Substance Agreement - Opioid - Scan on 10/11/2022  1:00 PM   [Media Unavailable] Controlled Substance Agreement - Opioid - Scan on 10/4/2021  7:45 PM   [Media Unavailable] Controlled Substance Agreement - Opioid - Scan on 9/2/2020  7:28 AM               Health Care Directive  Patient has a Health Care Directive on file  Advance care planning document is on file and is current.      10/29/2024   General Health   How would you rate your overall physical health? Good   Feel stress (tense, anxious, or unable to sleep) To some extent      (!) STRESS CONCERN      10/29/2024   Nutrition   Diet: Regular (no restrictions)            10/29/2024   Exercise   Days per week of moderate/strenous exercise 5 days   Average minutes spent exercising at this level 30 min            10/29/2024   Social Factors   Frequency of gathering with friends or relatives More than three times a week   Worry food won't last until get money to buy more No   Food not last or not have enough money for food? No   Do you have housing? (Housing is defined as stable permanent housing and does not include staying ouside in a car, in a tent, in an abandoned building, in an overnight shelter, or couch-surfing.) Yes   Are you worried about  losing your housing? No   Lack of transportation? No   Unable to get utilities (heat,electricity)? No            10/29/2024   Fall Risk   Fallen 2 or more times in the past year? No    Trouble with walking or balance? No        Patient-reported          10/29/2024   Activities of Daily Living- Home Safety   Needs help with the following daily activites Shopping    Preparing meals   Safety concerns in the home None of the above       Multiple values from one day are sorted in reverse-chronological order         10/29/2024   Dental   Dentist two times every year? (!) NO            10/29/2024   Hearing Screening   Hearing concerns? None of the above            10/29/2024   Driving Risk Screening   Patient/family members have concerns about driving No            10/29/2024   General Alertness/Fatigue Screening   Have you been more tired than usual lately? No            10/29/2024   Urinary Incontinence Screening   Bothered by leaking urine in past 6 months Yes            10/29/2024   TB Screening   Were you born outside of the [place]? No                    10/29/2024   Substance Use   Alcohol more than 3/day or more than 7/wk No   Do you have a current opioid prescription? (!) YES   How severe/bad is pain from 1 to 10? 8/10   Do you use any other substances recreationally? No    (!) PRESCRIPTION DRUGS       Multiple values from one day are sorted in reverse-chronological order         10/29/2024     1:13 PM   OPIOID RISK TOOL TOTAL SCORE   Total Score 1     Low Risk (0-3)  Moderate Risk (4-7)  High Risk (>8)  Social History     Tobacco Use    Smoking status: Every Day     Current packs/day: 0.50     Average packs/day: 0.5 packs/day for 38.0 years (19.0 ttl pk-yrs)     Types: Cigarettes    Smokeless tobacco: Never    Tobacco comments:     4-5 per day.    Vaping Use    Vaping status: Never Used   Substance Use Topics    Alcohol use: No     Alcohol/week: 0.0 standard drinks of alcohol    Drug use: No           11/22/2021    LAST FHS-7 RESULTS   1st degree relative breast or ovarian cancer No   Any relative bilateral breast cancer No   Any male have breast cancer No   Any ONE woman have BOTH breast AND ovarian cancer No   Any woman with breast cancer before 50yrs No   2 or more relatives with breast AND/OR ovarian cancer No   2 or more relatives with breast AND/OR bowel cancer No               ASCVD Risk   The 10-year ASCVD risk score (Memo PADILLA, et al., 2019) is: 16.3%    Values used to calculate the score:      Age: 74 years      Sex: Female      Is Non- : No      Diabetic: No      Tobacco smoker: Yes      Systolic Blood Pressure: 118 mmHg      Is BP treated: No      HDL Cholesterol: 75 mg/dL      Total Cholesterol: 195 mg/dL            Reviewed and updated as needed this visit by Provider                      Current providers sharing in care for this patient include:  Patient Care Team:  Lisa Merino NP as PCP - General  Lisa Merino NP as Assigned Pain Medication Provider  Lisa Merino NP as Assigned PCP  Renzo Bellamy MD as MD (Cardiovascular Disease)  Gagandeep Denny MD as Assigned Musculoskeletal Provider  Renzo Bellamy MD as Assigned Heart and Vascular Provider    The following health maintenance items are reviewed in Epic and correct as of today:  Health Maintenance   Topic Date Due    ZOSTER IMMUNIZATION (1 of 2) Never done    RSV VACCINE (1 - Risk 60-74 years 1-dose series) Never done    DTAP/TDAP/TD IMMUNIZATION (2 - Td or Tdap) 07/30/2023    MAMMO SCREENING  11/22/2023    INFLUENZA VACCINE (1) 09/01/2024    COVID-19 Vaccine (4 - 2024-25 season) 09/01/2024    LIPID  10/25/2024    URINE DRUG SCREEN  10/25/2024    ANNUAL REVIEW OF HM ORDERS  10/25/2024    CONTROLLED SUBSTANCE AGREEMENT FOR CHRONIC PAIN MANAGEMENT  10/25/2024    NICOTINE/TOBACCO CESSATION COUNSELING Q 1 YR  10/25/2024    MEDICARE ANNUAL WELLNESS VISIT  10/25/2024    COLORECTAL CANCER SCREENING   "12/15/2024    NASRA ASSESSMENT  05/08/2025    LUNG CANCER SCREENING  09/12/2025    FALL RISK ASSESSMENT  10/29/2025    PHQ-9  10/29/2025    GLUCOSE  10/25/2026    ADVANCE CARE PLANNING  10/25/2028    DEXA  09/29/2029    SPIROMETRY  Completed    HEPATITIS C SCREENING  Completed    DEPRESSION ACTION PLAN  Completed    Pneumococcal Vaccine: 65+ Years  Completed    COPD ACTION PLAN  Addressed    HPV IMMUNIZATION  Aged Out    MENINGITIS IMMUNIZATION  Aged Out    RSV MONOCLONAL ANTIBODY  Aged Out            Objective    Exam  /69 (BP Location: Left arm, Patient Position: Sitting, Cuff Size: Adult Regular)   Pulse 71   Temp 97.6  F (36.4  C) (Temporal)   Ht 1.545 m (5' 0.83\")   Wt 64.5 kg (142 lb 1.6 oz)   LMP  (LMP Unknown)   SpO2 95%   BMI 27.00 kg/m     Estimated body mass index is 27 kg/m  as calculated from the following:    Height as of this encounter: 1.545 m (5' 0.83\").    Weight as of this encounter: 64.5 kg (142 lb 1.6 oz).    Physical Exam  GENERAL: alert and no distress  EYES: Eyes grossly normal to inspection, PERRL and conjunctivae and sclerae normal  HENT: ear canals and TM's normal, nose and mouth without ulcers or lesions  NECK: no adenopathy, no asymmetry, masses, or scars  RESP: lungs clear to auscultation - no rales, rhonchi or wheezes  CV: regular rate and rhythm, normal S1 S2, no S3 or S4, no murmur, click or rub, no peripheral edema  ABDOMEN: soft, nontender, no hepatosplenomegaly, no masses and bowel sounds normal  MS: gait is antalgic; hematoma right lower leg  SKIN: no suspicious lesions or rashes  NEURO: Normal strength and tone, mentation intact and speech normal  PSYCH: mentation appears normal, affect normal/bright         10/29/2024   Mini Cog   Clock Draw Score 0 Abnormal   3 Item Recall 0 objects recalled   Mini Cog Total Score 0                 Signed Electronically by: Lisa Merino NP    "

## 2024-10-29 NOTE — LETTER
November 12, 2024      Shania Holliday  [address] [place] [address] [facility] [id number]        Dear ,    We are writing to inform you of your test results.    Cholesterol level looks good.   Your metabolic panel (electrolytes, blood sugar, kidney and liver function) is normal.    White blood cell count and hemoglobin are normal.     Resulted Orders   Lipid panel reflex to direct LDL Non-fasting   Result Value Ref Range    Cholesterol 174 <200 mg/dL    Triglycerides 75 <150 mg/dL    Direct Measure HDL 76 >=50 mg/dL    LDL Cholesterol Calculated 83 <100 mg/dL    Non HDL Cholesterol 98 <130 mg/dL    Patient Fasting > 8hrs? Yes     Narrative    Cholesterol  Desirable: < 200 mg/dL  Borderline High: 200 - 239 mg/dL  High: >= 240 mg/dL    Triglycerides  Normal: < 150 mg/dL  Borderline High: 150 - 199 mg/dL  High: 200-499 mg/dL  Very High: >= 500 mg/dL    Direct Measure HDL  Female: >= 50 mg/dL   Male: >= 40 mg/dL    LDL Cholesterol  Desirable: < 100 mg/dL  Above Desirable: 100 - 129 mg/dL   Borderline High: 130 - 159 mg/dL   High:  160 - 189 mg/dL   Very High: >= 190 mg/dL    Non HDL Cholesterol  Desirable: < 130 mg/dL  Above Desirable: 130 - 159 mg/dL  Borderline High: 160 - 189 mg/dL  High: 190 - 219 mg/dL  Very High: >= 220 mg/dL   Comprehensive metabolic panel (BMP + Alb, Alk Phos, ALT, AST, Total. Bili, TP)   Result Value Ref Range    Sodium 136 135 - 145 mmol/L    Potassium 4.2 3.4 - 5.3 mmol/L    Carbon Dioxide (CO2) 26 22 - 29 mmol/L    Anion Gap 11 7 - 15 mmol/L    Urea Nitrogen 7.5 (L) 8.0 - 23.0 mg/dL    Creatinine 0.81 0.51 - 0.95 mg/dL    GFR Estimate 76 >60 mL/min/1.73m2      Comment:      eGFR calculated using 2021 CKD-EPI equation.    Calcium 9.2 8.8 - 10.4 mg/dL      Comment:      Reference intervals for this test were updated on 7/16/2024 to reflect our healthy population more accurately. There may be differences in the flagging of prior results with similar values performed with this method. Those prior  results can be interpreted in the context of the updated reference intervals.    Chloride 99 98 - 107 mmol/L    Glucose 96 70 - 99 mg/dL    Alkaline Phosphatase 101 40 - 150 U/L    AST 22 0 - 45 U/L    ALT 14 0 - 50 U/L    Protein Total 6.9 6.4 - 8.3 g/dL    Albumin 4.3 3.5 - 5.2 g/dL    Bilirubin Total 0.4 <=1.2 mg/dL    Patient Fasting > 8hrs? Yes    CBC with platelets   Result Value Ref Range    WBC Count 8.8 4.0 - 11.0 10e3/uL    RBC Count 4.25 3.80 - 5.20 10e6/uL    Hemoglobin 12.2 11.7 - 15.7 g/dL    Hematocrit 37.5 35.0 - 47.0 %    MCV 88 78 - 100 fL    MCH 28.7 26.5 - 33.0 pg    MCHC 32.5 31.5 - 36.5 g/dL    RDW 15.4 (H) 10.0 - 15.0 %    Platelet Count 410 150 - 450 10e3/uL   Urine Drug Confirmation Panel   Result Value Ref Range    Oxycodone ng/mL 828 (H) <50 ng/mL    Oxycodone 583 Absent ng/mg [creat]      Comment:      Sources of oxycodone are scheduled prescription medications.    Oxymorphone ng/mL 4,620 (H) <50 ng/mL    Oxymorphone 3,254 Absent ng/mg [creat]      Comment:      Oxymorphone is an expected metabolite of oxycodone. Oxymorphone is also available as a scheduled prescription medication.    Narrative    This test was developed and its performance characteristics determined by the [facility],  Special Chemistry Laboratory. It has not been cleared or approved by the FDA. The laboratory is regulated under CLIA as qualified to perform high-complexity testing. This test is used for clinical purposes. It should not be regarded as investigational or for research.  Drugs with concentrations less than the cutoff will not be reported.  The drugs with applicable detection cutoff limits that are included within the Drug Confirmation Panel are:    The following drugs are detected with a cutoff of 3 ng/ml: FENTANYL    The following drugs are detected with a cutoff of 5 ng/mL: 6-ACETYLMORPHINE, BUPRENORPHINE, NALOXONE, NORBUPRENORPHINE, NORFENTANYL    The following drugs are  detected with a cutoff of 10 ng/mL: SUFENTANIL    The following drugs are detected with a cutoff of 20 ng/mL: PCP (PHENCYCLIDINE)    The following drugs are detected with a cutoff of 50 ng/mL: 7-AMINOCLONAZEPAM, 7-AMINOFLUNITRAZEPAM, ALPRAZOLAM, AMPHETAMINE, A-OH-ALPRAZOLAM, A-OH-MIDAZOLAM, A-OH-TRIAZOLAM, BENZOYLECGONINE (Cocaine Metabolite), CLONAZEPAM, COCAETHYLENE (Cocaine Metabolite), CODEINE, DIAZEPAM, DIHYDROCODEINE, EDDP (Methadone Metabolite), HYDROCODONE, HYDROMORPHONE, LORAZEPAM, MDA (3,4-Methylenedioxyamphetamine), MDEA (3,3-Tudzwcylpiqdii-E-ethylcathinone), MDMA (Methylenedioxyamphetamine,Ecstasy), MEPERIDINE, METHADONE, METHAMPHETAMINE, METHYLPHENIDATE (Ritalin), MORPHINE, NALTREXONE, N-DESMETH-TAPENTADOL, NORCODEINE, NORDIAZEPAM, NORMEPERIDINE, O-SADA-TRAMADOL, OXAZEPAM, OXYCODONE, OXYMORPHONE, PROPOXYPHENE, RITALINIC ACID, TAPENTADOL, TEMAZEPAM, THEBAINE, TRAMADOL    The following drugs are detected with a cutoff of 100 ng/mL: GABAPENTIN, KETAMINE    The following drugs are detected with a cutoff of 200 ng/mL: PREGABALIN, XYLAZINE    Providers with questions surrounding testing results should contact the Clinical Chemistry service line.   Urine Creatinine for Drug Screen Panel   Result Value Ref Range    Creatinine Urine for Drug Screen 142 mg/dL      Comment:      The reference range has not been established for creatinine in random urines. The results should be integrated into the clinical context for interpretation.       If you have any questions or concerns, please call the clinic at the number listed above.       Sincerely,      Lisa Merino NP/eva

## 2024-10-30 LAB — CREAT UR-MCNC: 142 MG/DL

## 2024-10-31 LAB
OXYCODONE UR CFM-MCNC: 828 NG/ML
OXYCODONE/CREAT UR: 583 NG/MG {CREAT}
OXYMORPHONE UR CFM-MCNC: 4620 NG/ML
OXYMORPHONE/CREAT UR: 3254 NG/MG {CREAT}

## 2025-01-29 ENCOUNTER — VIRTUAL VISIT (OUTPATIENT)
Dept: FAMILY MEDICINE | Facility: CLINIC | Age: 75
End: 2025-01-29
Payer: COMMERCIAL

## 2025-01-29 DIAGNOSIS — D18.02: ICD-10-CM

## 2025-01-29 DIAGNOSIS — J44.9 CHRONIC OBSTRUCTIVE PULMONARY DISEASE, UNSPECIFIED COPD TYPE (H): ICD-10-CM

## 2025-01-29 DIAGNOSIS — G89.4 CHRONIC PAIN SYNDROME: Primary | ICD-10-CM

## 2025-01-29 DIAGNOSIS — F11.90 CHRONIC, CONTINUOUS USE OF OPIOIDS: ICD-10-CM

## 2025-01-29 DIAGNOSIS — M25.511 RIGHT SHOULDER PAIN, UNSPECIFIED CHRONICITY: ICD-10-CM

## 2025-01-29 PROCEDURE — 98014 SYNCH AUDIO-ONLY EST MOD 30: CPT | Performed by: NURSE PRACTITIONER

## 2025-01-29 RX ORDER — OXYCODONE HYDROCHLORIDE 5 MG/1
5-10 TABLET ORAL EVERY 4 HOURS PRN
Qty: 150 TABLET | Refills: 0 | Status: SHIPPED | OUTPATIENT
Start: 2025-03-08 | End: 2025-01-29

## 2025-01-29 RX ORDER — OXYCODONE HYDROCHLORIDE 5 MG/1
5-10 TABLET ORAL EVERY 4 HOURS PRN
Qty: 150 TABLET | Refills: 0 | Status: SHIPPED | OUTPATIENT
Start: 2025-03-09

## 2025-01-29 RX ORDER — OXYCODONE HYDROCHLORIDE 5 MG/1
5-10 TABLET ORAL EVERY 4 HOURS PRN
Qty: 150 TABLET | Refills: 0 | Status: SHIPPED | OUTPATIENT
Start: 2025-04-08

## 2025-01-29 RX ORDER — OXYCODONE HYDROCHLORIDE 5 MG/1
5-10 TABLET ORAL EVERY 4 HOURS PRN
Qty: 150 TABLET | Refills: 0 | Status: SHIPPED | OUTPATIENT
Start: 2025-02-07

## 2025-01-29 ASSESSMENT — PATIENT HEALTH QUESTIONNAIRE - PHQ9
SUM OF ALL RESPONSES TO PHQ QUESTIONS 1-9: 4
SUM OF ALL RESPONSES TO PHQ QUESTIONS 1-9: 4
10. IF YOU CHECKED OFF ANY PROBLEMS, HOW DIFFICULT HAVE THESE PROBLEMS MADE IT FOR YOU TO DO YOUR WORK, TAKE CARE OF THINGS AT HOME, OR GET ALONG WITH OTHER PEOPLE: SOMEWHAT DIFFICULT

## 2025-01-29 NOTE — PROGRESS NOTES
Shania is a 74 year old who is being evaluated via a billable telephone visit.    What phone number would you like to be contacted at? [phone]  How would you like to obtain your AVS? Yuly  Originating Location (pt. Location): Home    Distant Location (provider location):  On-site  Telephone visit completed due to the patient did not have access to video, while the distant provider did.    Assessment & Plan     (G89.4) Chronic pain syndrome  (primary encounter diagnosis)  Comment:  stable  Plan: oxyCODONE (ROXICODONE) 5 MG tablet, oxyCODONE         (ROXICODONE) 5 MG tablet, oxyCODONE         (ROXICODONE) 5 MG tablet, DISCONTINUED:         oxyCODONE (ROXICODONE) 5 MG tablet        Reviewed .  The current medical regimen is effective;  continue present plan and medications. Refills given for 3 months.  Follow up in 3 months.     (F11.90) Chronic, continuous use of opioids  Comment:   Plan: oxyCODONE (ROXICODONE) 5 MG tablet, oxyCODONE         (ROXICODONE) 5 MG tablet, oxyCODONE         (ROXICODONE) 5 MG tablet, DISCONTINUED:         oxyCODONE (ROXICODONE) 5 MG tablet            (M25.511) Right shoulder pain, unspecified chronicity  Comment:   Plan: Orthopedic  Referral        Will refer back to sports medicine.     (D18.02) Hemangioma, intracranial structures (H)  Comment: stable  Plan: She will continue to follow up with neurosurgery.     (J44.9) Chronic obstructive pulmonary disease, unspecified COPD type (H)  Comment: stable  Plan: Encouraged smoking cessation.     The longitudinal plan of care for the diagnosis(es)/condition(s) as documented were addressed during this visit. Due to the added complexity in care, I will continue to support Shania in the subsequent management and with ongoing continuity of care.        I spent a total of 34 minutes on the day of the visit.   Time spent by me today doing chart review, history and exam, documentation and further activities per the note    BMI  Estimated  "body mass index is 27 kg/m  as calculated from the following:    Height as of 10/29/24: 1.545 m (5' 0.83\").    Weight as of 10/29/24: 64.5 kg (142 lb 1.6 oz).             Michael Jauregui is a 74 year old, presenting for the following health issues:  Recheck Medication    HPI   She continues to have right shoulder pain.  She did get relief from a cortisone injection in October and would like to get another one.    Her overall chronic pain is stable.                    Objective    Vitals - Patient Reported  Systolic (Patient Reported): 113  Diastolic (Patient Reported): 63  Weight (Patient Reported): 65.8 kg (145 lb)  SpO2 (Patient Reported): 98  Pulse (Patient Reported): 82  Pain Score: Severe Pain (8)  Pain Loc:  (all over)      Vitals:  No vitals were obtained today due to virtual visit.    Physical Exam   General: Alert and no distress //Respiratory: No audible wheeze, cough, or shortness of breath // Psychiatric:  Appropriate affect, tone, and pace of words            Phone call duration: 24 minutes  Signed Electronically by: Lisa Merino NP    "

## 2025-03-04 ENCOUNTER — HOSPITAL ENCOUNTER (OUTPATIENT)
Dept: MRI IMAGING | Facility: HOSPITAL | Age: 75
Discharge: HOME | End: 2025-03-04
Attending: STUDENT IN AN ORGANIZED HEALTH CARE EDUCATION/TRAINING PROGRAM
Payer: COMMERCIAL

## 2025-03-04 DIAGNOSIS — S80.11XS CONTUSION OF RIGHT LOWER LEG, SEQUELA: ICD-10-CM

## 2025-03-04 PROCEDURE — 73718 MRI LOWER EXTREMITY W/O DYE: CPT | Mod: RT

## 2025-03-04 PROCEDURE — 73718 MRI LOWER EXTREMITY W/O DYE: CPT | Mod: RT,XS

## 2025-03-07 ENCOUNTER — ANCILLARY PROCEDURE (OUTPATIENT)
Dept: GENERAL RADIOLOGY | Facility: CLINIC | Age: 75
End: 2025-03-07
Attending: STUDENT IN AN ORGANIZED HEALTH CARE EDUCATION/TRAINING PROGRAM
Payer: COMMERCIAL

## 2025-03-07 DIAGNOSIS — M54.50 LOW BACK PAIN: ICD-10-CM

## 2025-03-07 PROCEDURE — 72100 X-RAY EXAM L-S SPINE 2/3 VWS: CPT | Mod: TC | Performed by: RADIOLOGY

## 2025-04-30 ENCOUNTER — VIRTUAL VISIT (OUTPATIENT)
Dept: FAMILY MEDICINE | Facility: CLINIC | Age: 75
End: 2025-04-30
Payer: COMMERCIAL

## 2025-04-30 DIAGNOSIS — G89.4 CHRONIC PAIN SYNDROME: Primary | ICD-10-CM

## 2025-04-30 DIAGNOSIS — F11.90 CHRONIC, CONTINUOUS USE OF OPIOIDS: ICD-10-CM

## 2025-04-30 PROCEDURE — 98014 SYNCH AUDIO-ONLY EST MOD 30: CPT | Performed by: NURSE PRACTITIONER

## 2025-04-30 PROCEDURE — 1125F AMNT PAIN NOTED PAIN PRSNT: CPT | Performed by: NURSE PRACTITIONER

## 2025-04-30 RX ORDER — OXYCODONE HYDROCHLORIDE 5 MG/1
5-10 TABLET ORAL EVERY 4 HOURS PRN
Qty: 150 TABLET | Refills: 0 | Status: SHIPPED | OUTPATIENT
Start: 2025-05-08

## 2025-04-30 RX ORDER — OXYCODONE HYDROCHLORIDE 5 MG/1
5-10 TABLET ORAL EVERY 4 HOURS PRN
Qty: 150 TABLET | Refills: 0 | Status: SHIPPED | OUTPATIENT
Start: 2025-06-07

## 2025-04-30 RX ORDER — OXYCODONE HYDROCHLORIDE 5 MG/1
5-10 TABLET ORAL EVERY 4 HOURS PRN
Qty: 150 TABLET | Refills: 0 | Status: SHIPPED | OUTPATIENT
Start: 2025-07-07

## 2025-04-30 ASSESSMENT — ANXIETY QUESTIONNAIRES
GAD7 TOTAL SCORE: 0
7. FEELING AFRAID AS IF SOMETHING AWFUL MIGHT HAPPEN: NOT AT ALL
GAD7 TOTAL SCORE: 0
3. WORRYING TOO MUCH ABOUT DIFFERENT THINGS: NOT AT ALL
2. NOT BEING ABLE TO STOP OR CONTROL WORRYING: NOT AT ALL
6. BECOMING EASILY ANNOYED OR IRRITABLE: NOT AT ALL
4. TROUBLE RELAXING: NOT AT ALL
1. FEELING NERVOUS, ANXIOUS, OR ON EDGE: NOT AT ALL
5. BEING SO RESTLESS THAT IT IS HARD TO SIT STILL: NOT AT ALL
IF YOU CHECKED OFF ANY PROBLEMS ON THIS QUESTIONNAIRE, HOW DIFFICULT HAVE THESE PROBLEMS MADE IT FOR YOU TO DO YOUR WORK, TAKE CARE OF THINGS AT HOME, OR GET ALONG WITH OTHER PEOPLE: NOT DIFFICULT AT ALL

## 2025-04-30 NOTE — PROGRESS NOTES
"Shania is a 74 year old who is being evaluated via a billable telephone visit.    What phone number would you like to be contacted at? [phone]  How would you like to obtain your AVS? Mail a copy  Originating Location (pt. Location): Home    Distant Location (provider location):  On-site  Telephone visit completed due to the patient did not have access to video, while the distant provider did.    Assessment & Plan     (G89.4) Chronic pain syndrome  Comment: stable  Plan: oxyCODONE (ROXICODONE) 5 MG tablet, oxyCODONE         (ROXICODONE) 5 MG tablet, oxyCODONE         (ROXICODONE) 5 MG tablet         checked.  The current medical regimen is effective;  continue present plan and medications.  Refills given for 3 months.  Follow up in 3 months with phone visit.     (F11.90) Chronic, continuous use of opioids  Comment:   Plan: oxyCODONE (ROXICODONE) 5 MG tablet, oxyCODONE         (ROXICODONE) 5 MG tablet, oxyCODONE         (ROXICODONE) 5 MG tablet        See above.     The longitudinal plan of care for the diagnosis(es)/condition(s) as documented were addressed during this visit. Due to the added complexity in care, I will continue to support Shania in the subsequent management and with ongoing continuity of care.      I spent a total of 27 minutes on the day of the visit.   Time spent by me today doing chart review, history and exam, documentation and further activities per the note      BMI  Estimated body mass index is 27 kg/m  as calculated from the following:    Height as of 10/29/24: 1.545 m (5' 0.83\").    Weight as of 10/29/24: 64.5 kg (142 lb 1.6 oz).             Subjective   Shania is a 74 year old, presenting for the following health issues:  Follow Up (Chronic pain)    HPI    She got some relief from a cortisone injection in her shoulder, but it has worn off and she     Her back flared up when she was getting an MRI of her leg.  It has settle back down.    Her overall chronic pain is stable on her current " medication regimen.                  Objective    Vitals - Patient Reported  Systolic (Patient Reported): 135  Diastolic (Patient Reported): 72  Weight (Patient Reported): 68.5 kg (151 lb)  SpO2 (Patient Reported): 98  Pain Score: Severe Pain (8)  Pain Loc: Foot (neck, back, shoulder, leg)      Vitals:  No vitals were obtained today due to virtual visit.    Physical Exam   General: Alert and no distress //Respiratory: No audible wheeze, cough, or shortness of breath // Psychiatric:  Appropriate affect, tone, and pace of words            Phone call duration: 26 minutes  Signed Electronically by: Lisa Merino NP

## 2025-06-09 DIAGNOSIS — G89.4 CHRONIC PAIN SYNDROME: ICD-10-CM

## 2025-06-09 DIAGNOSIS — F11.90 CHRONIC, CONTINUOUS USE OF OPIOIDS: ICD-10-CM

## 2025-06-09 RX ORDER — OXYCODONE HYDROCHLORIDE 5 MG/1
5-10 TABLET ORAL EVERY 4 HOURS PRN
Qty: 150 TABLET | Refills: 0 | Status: SHIPPED | OUTPATIENT
Start: 2025-06-09 | End: 2025-06-10

## 2025-06-09 NOTE — TELEPHONE ENCOUNTER
Marisa-Please review and sign if agree.  Pharmacy change request.    Thank you!  BONITA RodriguezN, RN-[facility]

## 2025-06-09 NOTE — TELEPHONE ENCOUNTER
Medication Question or Refill        What medication are you calling about (include dose and sig)?: oxycodone 5 mg    Preferred Pharmacy:   [facility] - [place], [place] - [address] [place] [address]  P: [phone]  F: [phone]  [address] [place] [address] [address]  [facility] [id number]        Controlled Substance Agreement on file:   CSA -- Patient Level:     [Media Unavailable] Controlled Substance Agreement - Opioid - Scan on 10/29/2024 11:32 AM   [Media Unavailable] Controlled Substance Agreement - Opioid - Scan on 10/25/2023  3:16 PM   [Media Unavailable] Controlled Substance Agreement - Opioid - Scan on 10/11/2022  1:00 PM   [Media Unavailable] Controlled Substance Agreement - Opioid - Scan on 10/4/2021  7:45 PM   [Media Unavailable] Controlled Substance Agreement - Opioid - Scan on 9/2/2020  7:28 AM       Who prescribed the medication?: Lisa Merino, NP     Do you need a refill? Yes    When did you use the medication last? 6/6/25    Patient offered an appointment? No    Do you have any questions or concerns?  Yes: [facility] and [facility]      Okay to leave a detailed message?: Yes at Home number on file [phone] (home)

## 2025-06-10 ENCOUNTER — TELEPHONE (OUTPATIENT)
Dept: FAMILY MEDICINE | Facility: CLINIC | Age: 75
End: 2025-06-10
Payer: COMMERCIAL

## 2025-06-10 DIAGNOSIS — F11.90 CHRONIC, CONTINUOUS USE OF OPIOIDS: ICD-10-CM

## 2025-06-10 DIAGNOSIS — G89.4 CHRONIC PAIN SYNDROME: ICD-10-CM

## 2025-06-10 RX ORDER — OXYCODONE HYDROCHLORIDE 5 MG/1
5-10 TABLET ORAL EVERY 4 HOURS PRN
Qty: 150 TABLET | Refills: 0 | Status: SHIPPED | OUTPATIENT
Start: 2025-06-10

## 2025-06-10 RX ORDER — OXYCODONE HYDROCHLORIDE 5 MG/1
5-10 TABLET ORAL EVERY 4 HOURS PRN
Qty: 150 TABLET | Refills: 0 | Status: CANCELLED | OUTPATIENT
Start: 2025-06-10

## 2025-06-10 NOTE — TELEPHONE ENCOUNTER
See 6/10/25 telephone encounter.    BONITA RodriguezN, RN-BC  [facility]

## 2025-06-10 NOTE — TELEPHONE ENCOUNTER
Patient called back to check on the status of their request. Writer informed her that pharmacy cannot dispense this to her. Patient wanted to try another pharmacy and wants it sent to the [facility] in [place]. Writer will pass the message to provider and refill team for further review.     [facility] [address] [place] [address].

## 2025-06-10 NOTE — TELEPHONE ENCOUNTER
[facility] pharmacy is off-line at this time.  Pt calling.  She is trying to get the 6/9/25 rx filled for the oxycodone that had been sent by PCP.  Pended the 6/9/25 rx.  Sending to Marisa Merino- [facility].  PT is needing to fill the rx today.    OK to leave a detailed message. PT wants to know when this is handled.  Thanks!    Olaf, Triage RN  [facility]/ [phone]

## 2025-06-10 NOTE — TELEPHONE ENCOUNTER
Writer called patient and reviewed TORI Merino, CNP, sent refill for oxycodone 5 mg tablet to  [facility] [id number] [place] [facility] - [address]    Patient verbalized understanding and in agreement with plan.    BONITA RodriguezN, RN-[facility]

## 2025-08-05 ENCOUNTER — VIRTUAL VISIT (OUTPATIENT)
Dept: FAMILY MEDICINE | Facility: CLINIC | Age: 75
End: 2025-08-05
Payer: COMMERCIAL

## 2025-08-05 DIAGNOSIS — F11.90 CHRONIC, CONTINUOUS USE OF OPIOIDS: ICD-10-CM

## 2025-08-05 DIAGNOSIS — K21.9 GASTROESOPHAGEAL REFLUX DISEASE WITHOUT ESOPHAGITIS: ICD-10-CM

## 2025-08-05 DIAGNOSIS — G89.4 CHRONIC PAIN SYNDROME: ICD-10-CM

## 2025-08-05 DIAGNOSIS — E78.5 HYPERLIPIDEMIA, UNSPECIFIED HYPERLIPIDEMIA TYPE: ICD-10-CM

## 2025-08-05 PROCEDURE — 1125F AMNT PAIN NOTED PAIN PRSNT: CPT | Performed by: NURSE PRACTITIONER

## 2025-08-05 PROCEDURE — 98014 SYNCH AUDIO-ONLY EST MOD 30: CPT | Performed by: NURSE PRACTITIONER

## 2025-08-05 RX ORDER — OXYCODONE HYDROCHLORIDE 5 MG/1
5-10 TABLET ORAL EVERY 4 HOURS PRN
Qty: 150 TABLET | Refills: 0 | Status: SHIPPED | OUTPATIENT
Start: 2025-08-06

## 2025-08-05 RX ORDER — PANTOPRAZOLE SODIUM 40 MG/1
40 TABLET, DELAYED RELEASE ORAL DAILY
Qty: 90 TABLET | Refills: 1 | Status: SHIPPED | OUTPATIENT
Start: 2025-08-05

## 2025-08-05 RX ORDER — METHOCARBAMOL 750 MG/1
750 TABLET, FILM COATED ORAL EVERY 6 HOURS PRN
Qty: 180 TABLET | Refills: 2 | Status: SHIPPED | OUTPATIENT
Start: 2025-08-05

## 2025-08-05 RX ORDER — OXYCODONE HYDROCHLORIDE 5 MG/1
5-10 TABLET ORAL EVERY 4 HOURS PRN
Qty: 150 TABLET | Refills: 0 | Status: SHIPPED | OUTPATIENT
Start: 2025-08-05 | End: 2025-08-06

## 2025-08-05 RX ORDER — ROSUVASTATIN CALCIUM 20 MG/1
20 TABLET, COATED ORAL DAILY
Qty: 90 TABLET | Refills: 1 | Status: SHIPPED | OUTPATIENT
Start: 2025-08-05

## 2025-08-05 ASSESSMENT — PATIENT HEALTH QUESTIONNAIRE - PHQ9: SUM OF ALL RESPONSES TO PHQ QUESTIONS 1-9: 1

## 2025-08-06 ENCOUNTER — TELEPHONE (OUTPATIENT)
Dept: FAMILY MEDICINE | Facility: CLINIC | Age: 75
End: 2025-08-06
Payer: COMMERCIAL

## 2025-08-06 DIAGNOSIS — F11.90 CHRONIC, CONTINUOUS USE OF OPIOIDS: ICD-10-CM

## 2025-08-06 DIAGNOSIS — G89.4 CHRONIC PAIN SYNDROME: ICD-10-CM

## 2025-08-06 RX ORDER — OXYCODONE HYDROCHLORIDE 5 MG/1
5-10 TABLET ORAL EVERY 4 HOURS PRN
Qty: 150 TABLET | Refills: 0 | Status: SHIPPED | OUTPATIENT
Start: 2025-10-05

## 2025-08-06 RX ORDER — OXYCODONE HYDROCHLORIDE 5 MG/1
5-10 TABLET ORAL EVERY 4 HOURS PRN
Qty: 150 TABLET | Refills: 0 | Status: SHIPPED | OUTPATIENT
Start: 2025-09-05

## (undated) RX ORDER — LIDOCAINE HYDROCHLORIDE 10 MG/ML
INJECTION, SOLUTION EPIDURAL; INFILTRATION; INTRACAUDAL; PERINEURAL
Status: DISPENSED
Start: 2020-12-14

## (undated) RX ORDER — SODIUM CHLORIDE 9 MG/ML
INJECTION, SOLUTION INTRAVENOUS
Status: DISPENSED
Start: 2020-12-14

## (undated) RX ORDER — FENTANYL CITRATE 50 UG/ML
INJECTION, SOLUTION INTRAMUSCULAR; INTRAVENOUS
Status: DISPENSED
Start: 2020-12-14

## (undated) RX ORDER — ALBUTEROL SULFATE 0.83 MG/ML
SOLUTION RESPIRATORY (INHALATION)
Status: DISPENSED
Start: 2020-10-20